# Patient Record
Sex: FEMALE | Race: WHITE | NOT HISPANIC OR LATINO | Employment: FULL TIME | URBAN - METROPOLITAN AREA
[De-identification: names, ages, dates, MRNs, and addresses within clinical notes are randomized per-mention and may not be internally consistent; named-entity substitution may affect disease eponyms.]

---

## 2017-01-25 ENCOUNTER — OFFICE VISIT (OUTPATIENT)
Dept: NEUROLOGY | Facility: CLINIC | Age: 32
End: 2017-01-25
Payer: COMMERCIAL

## 2017-01-25 VITALS
SYSTOLIC BLOOD PRESSURE: 133 MMHG | DIASTOLIC BLOOD PRESSURE: 82 MMHG | HEART RATE: 78 BPM | WEIGHT: 176.4 LBS | BODY MASS INDEX: 29.39 KG/M2 | HEIGHT: 65 IN

## 2017-01-25 DIAGNOSIS — R51.9 CHRONIC DAILY HEADACHE: Primary | ICD-10-CM
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
## 2017-01-25 PROCEDURE — 99204 OFFICE O/P NEW MOD 45 MIN: CPT | Performed by: PSYCHIATRY & NEUROLOGY

## 2017-01-25 RX ORDER — GABAPENTIN 100 MG/1
CAPSULE ORAL
Qty: 270 CAPSULE | Refills: 1 | Status: SHIPPED | OUTPATIENT
Start: 2017-01-25 | End: 2017-04-26 | Stop reason: DRUGHIGH
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
## 2017-01-25 NOTE — PATIENT INSTRUCTIONS
AFTER VISIT SUMMARY (AVS)  Signed Prescriptions:                        Disp   Refills    gabapentin (NEURONTIN) 100 MG capsule      270 ca*1        Sig: Week 1 and 2: 100 mg bed time. Week 3 and 4: 100 mg           two times/day. Week 5 and afterwards: 100 mg           three times/day  Authorizing Provider: DINORA RODRIGUEZ    She may consider taking over the counter ALEVE 200 MG 1-2 tablets for acute headaches. Made aware of rebound headache if she keeps taking it on a daily basis.     Diagnostic possibilities reviewed    Preventive Neurology: Encouraged to keep physically and mentally active with particular emphasis on daily stretching exercises, walking, and healthy eating.    To call us for follow-up appointment in next 3 month(s) or earlier if needed.    Thanks
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
## 2017-03-27 ENCOUNTER — TELEPHONE (OUTPATIENT)
Dept: NEUROLOGY | Facility: CLINIC | Age: 32
End: 2017-03-27

## 2017-03-27 ENCOUNTER — OFFICE VISIT (OUTPATIENT)
Dept: FAMILY MEDICINE | Facility: CLINIC | Age: 32
End: 2017-03-27
Payer: COMMERCIAL

## 2017-03-27 VITALS — SYSTOLIC BLOOD PRESSURE: 120 MMHG | TEMPERATURE: 98.8 F | DIASTOLIC BLOOD PRESSURE: 70 MMHG

## 2017-03-27 DIAGNOSIS — Z71.84 TRAVEL ADVICE ENCOUNTER: ICD-10-CM

## 2017-03-27 DIAGNOSIS — Z23 NEED FOR VACCINATION: Primary | ICD-10-CM

## 2017-03-27 PROCEDURE — 90472 IMMUNIZATION ADMIN EACH ADD: CPT | Mod: GA | Performed by: NURSE PRACTITIONER

## 2017-03-27 PROCEDURE — 99402 PREV MED CNSL INDIV APPRX 30: CPT | Mod: 25 | Performed by: NURSE PRACTITIONER

## 2017-03-27 PROCEDURE — 90471 IMMUNIZATION ADMIN: CPT | Mod: GA | Performed by: NURSE PRACTITIONER

## 2017-03-27 PROCEDURE — 90691 TYPHOID VACCINE IM: CPT | Mod: GA | Performed by: NURSE PRACTITIONER

## 2017-03-27 PROCEDURE — 90632 HEPA VACCINE ADULT IM: CPT | Mod: GA | Performed by: NURSE PRACTITIONER

## 2017-03-27 RX ORDER — AZITHROMYCIN 500 MG/1
500 TABLET, FILM COATED ORAL DAILY
Qty: 3 TABLET | Refills: 0 | Status: SHIPPED | OUTPATIENT
Start: 2017-03-27 | End: 2017-03-30

## 2017-03-27 NOTE — NURSING NOTE
"Chief Complaint   Patient presents with     [facility]     initial /70  Temp 98.8  F (37.1  C) (Oral) Estimated body mass index is 29.35 kg/(m^2) as calculated from the following:    Height as of 1/25/17: 5' 5\" (1.651 m).    Weight as of 1/25/17: 176 lb 6.4 oz (80 kg).  BP completed using cuff size: regular.  R arm      Health Maintenance that is potentially due pending provider review:  NONE    n/a    Shan Rocha ma  "

## 2017-03-27 NOTE — TELEPHONE ENCOUNTER
Called number listed below and was told they need to contact the [facility] insurance for coverage due to claim filed with them and not primary insurance. Pharmacy notified. Nothing need to be done on our end and no PA is needed at this time  Lucia Escobar MA

## 2017-03-27 NOTE — PATIENT INSTRUCTIONS
Today March 27, 2017 you received the    Hepatitis A Vaccine - Please return on 9/23/17 or later for your 2nd and final dose.    Typhoid - injectable. This vaccine is valid for two years.   .    These appointments can be made as a NURSE ONLY visit.    **It is very important for the vaccinations to be given on the scheduled day(s), this helps ensure you receive the full effectiveness of the vaccine.**    Please call [facility] with any questions [phone]    Thank you for visiting [facility]

## 2017-03-27 NOTE — PROGRESS NOTES
Nurse Note      Itinerary:  [place]      Departure Date: 4/8/17      Return Date: 4/15/17      Length of Trip 1 week      Reason for Travel: Tourism           Urban or rural: both      Accommodations: Hotel        IMMUNIZATION HISTORY  Have you received any immunizations within the past 4 weeks?  No  Have you ever fainted from having your blood drawn or from an injection?  No  Have you ever had a fever reaction to vaccination?  No  Have you ever had any bad reaction or side effect from any vaccination?  No  Have you ever had hepatitis A or B vaccine?  No  Do you live (or work closely) with anyone who has AIDS, an AIDS-like condition, any other immune disorder or who is on chemotherapy for cancer?  No  Do you have a family history of immunodeficiency?  No  Have you received any injection of immune globulin or any blood products during the past 12 months?  No    Patient roomed by Shan Slaughter  Teresa Salinas is a 31 year old female seen today with spouse for counsultation for international travel to [place] for Tourism.  Patient will be departing in  10 day(s) and staying for   1 week(s) and  traveling with spouse.      Patient itinerary :  will be in the [facility] which presents risk for Dengue Fever, Chikungungya, Zika, food borne illnesses, motor vehicle accidents, Typhoid and Chagas disease. exposure.      Patient's activities will include sightseeing, beach activities (salt water) and fresh water exposure.    Patient's country of birth is [place]    Special medical concerns: family planning questions  Pre-travel questionnaire was completed by patient and reviewed by provider.     Vitals: /70  Temp 98.8  F (37.1  C) (Oral)  BMI= There is no height or weight on file to calculate BMI.    EXAM:  General:  Well-nourished, well-developed in no acute distress.  Appears to be stated age, interacts appropriately and expresses understanding of information given to  patient.    Current Outpatient Prescriptions   Medication Sig Dispense Refill     gabapentin (NEURONTIN) 100 MG capsule Week 1 and 2: 100 mg bed time. Week 3 and 4: 100 mg two times/day. Week 5 and afterwards: 100 mg three times/day 270 capsule 1     minoxidil 5 % SOLN Apply topically twice daily. 60 mL 0     cetirizine (ZYRTEC) 10 MG tablet Take 1 tablet (10 mg) by mouth every evening 30 tablet 1     multivitamin, therapeutic with minerals (MULTI-VITAMIN) TABS Take 1 tablet by mouth daily 100 tablet 3     Cholecalciferol (VITAMIN D) 2000 UNITS tablet Take 2,000 Units by mouth daily 100 tablet 3     etonogestrel (IMPLANON/NEXPLANON) 68 MG IMPL 1 each (68 mg) by Subdermal route once 1 each 0     biotin (BIOTIN 5000) 5 MG CAPS  30 capsule      albuterol (PROAIR HFA, PROVENTIL HFA, VENTOLIN HFA) 108 (90 BASE) MCG/ACT inhaler Inhale 2 puffs into the lungs every 6 hours as needed for shortness of breath / dyspnea or wheezing 2 Inhaler 6     Patient Active Problem List   Diagnosis     Allergic rhinitis     Mild intermittent asthma     Family history of thyroid disease     Allergies   Allergen Reactions     Contrast Dye Shortness Of Breath     Clindamycin Hives     Diagnostic X-Ray Materials Itching     Itching, sneezing.         Immunizations discussed include:   Hepatitis A:  Ordered/given today, risks, benefits and side effects reviewed  Hepatitis B: Up to date  Influenza: Up to date  Typhoid: Ordered/given today, risks, benefits and side effects reviewed  Rabies: Declined  Not concerned about risk of disease  Yellow Fever: Not indicated  Japanese Encephalitis: Not indicated  Meningococcus: Not indicated  Tetanus/Diphtheria: Up to date  Measles/Mumps/Rubella: Up to date  Cholera: Not needed  Polio: Up to date  Pneumococcal: Under age of 65  Varicella: Immune by disease history per patient report  Zostavax:  Not indicated  HPV:  Not indicated  TB:  Low risk     Altitude Exposure on this trip: No    ASSESSMENT/PLAN:     ICD-10-CM    1. Need for vaccination Z23 HEPA VACCINE ADULT IM     TYPHOID VACCINE, IM     HEPA VACCINE ADULT IM   2. Travel advice encounter Z71.89 HEPA VACCINE ADULT IM     TYPHOID VACCINE, IM     HEPA VACCINE ADULT IM     azithromycin (ZITHROMAX) 500 MG tablet     I have reviewed general recommendations for safe travel   including: food/water precautions, insect precautions, safer sex   practices given high prevalence of Zika, HIV and other STDs,   roadway safety. Educational materials and [employer] report provided.    [place]ia prophylaxis recommended: none  Symptomatic treatment for traveler's diarrhea: azithromycin  Altitude illness prevention and treatment: no      Evacuation insurance advised and resources were provided to patient.    Total visit time 30 minutes  with over 50% of time spent counseling patient as detailed above.    Mariya Hutchinson CNP

## 2017-03-27 NOTE — TELEPHONE ENCOUNTER
Plan does not cover gabapentin (NEURONTIN) 100 MG capsule.  Please call [phone] to initiate Prior Auth or change med.      ID# [id number]      [facility]

## 2017-04-26 ENCOUNTER — OFFICE VISIT (OUTPATIENT)
Dept: NEUROLOGY | Facility: CLINIC | Age: 32
End: 2017-04-26
Payer: COMMERCIAL

## 2017-04-26 VITALS
SYSTOLIC BLOOD PRESSURE: 142 MMHG | WEIGHT: 177 LBS | HEIGHT: 65 IN | BODY MASS INDEX: 29.49 KG/M2 | DIASTOLIC BLOOD PRESSURE: 67 MMHG | HEART RATE: 69 BPM

## 2017-04-26 DIAGNOSIS — R51.9 CHRONIC DAILY HEADACHE: Primary | ICD-10-CM

## 2017-04-26 PROCEDURE — 99214 OFFICE O/P EST MOD 30 MIN: CPT | Performed by: PSYCHIATRY & NEUROLOGY

## 2017-04-26 RX ORDER — GABAPENTIN 100 MG/1
CAPSULE ORAL
Qty: 540 CAPSULE | Refills: 1 | Status: SHIPPED | OUTPATIENT
Start: 2017-04-26 | End: 2017-07-05 | Stop reason: DRUGHIGH

## 2017-04-26 NOTE — NURSING NOTE
IMAGING: None  BLOOD TEST RESULTS: Not recommended on previous visit  Referral: YULIYA  Old records: YULIYA  EEG: YULIYA Rausch MA

## 2017-04-26 NOTE — NURSING NOTE
"Chief Complaint   Patient presents with     RECHECK     Headaches follow up/medication follow up (Neurotin). Pt complains of daily headaches. Headaches occurs 75% of the day.        Initial /67 (BP Location: Left arm, Patient Position: Chair, Cuff Size: Adult Regular)  Pulse 69  Ht 1.651 m (5' 5\")  Wt 80.3 kg (177 lb)  BMI 29.45 kg/m2 Estimated body mass index is 29.45 kg/(m^2) as calculated from the following:    Height as of this encounter: 1.651 m (5' 5\").    Weight as of this encounter: 80.3 kg (177 lb).  Medication Reconciliation: complete   Maya Rausch MA      "

## 2017-04-26 NOTE — PATIENT INSTRUCTIONS
AFTER VISIT SUMMARY (AVS)  Signed Prescriptions:                        Disp   Refills    gabapentin (NEURONTIN) 100 MG capsule      540 ca*1        Sig: Week 1: 100 mg morning and noon, 200 mg bed time.           Week 2: 200 mg morning and bed time, 100 mg noon.           Week 3 and afterwards: 200 mg three times/day  Authorizing Provider: DNIORA RODRIGUEZ      Diagnostic possibilities reviewed    Preventive Neurology: Encouraged to keep physically and mentally active with particular emphasis on daily stretching exercises, walking, and healthy eating.    To call us for follow-up appointment in next 10 week(s) or earlier if needed.    Thanks

## 2017-04-26 NOTE — MR AVS SNAPSHOT
After Visit Summary   4/26/2017    Teresa Salinas    MRN: [id number]           Patient Information     Date Of Birth          1985        Visit Information        Provider Department      4/26/2017 2:00 PM Ricardo Rodriguez MD [facility]        Today's Diagnoses     Chronic daily headache    -  1      Care Instructions    AFTER VISIT SUMMARY (AVS)  Signed Prescriptions:                        Disp   Refills    gabapentin (NEURONTIN) 100 MG capsule      540 ca*1        Sig: Week 1: 100 mg morning and noon, 200 mg bed time.           Week 2: 200 mg morning and bed time, 100 mg noon.           Week 3 and afterwards: 200 mg three times/day  Authorizing Provider: RICARDO RODRIGUEZ      Diagnostic possibilities reviewed    Preventive Neurology: Encouraged to keep physically and mentally active with particular emphasis on daily stretching exercises, walking, and healthy eating.    To call us for follow-up appointment in next 10 week(s) or earlier if needed.    Thanks               Follow-ups after your visit        Follow-up notes from your care team     Return in about 4 weeks (around 5/24/2017).      Who to contact     If you have questions or need follow up information about today's clinic visit or your schedule please contact [facility] directly at [phone].  Normal or non-critical lab and imaging results will be communicated to you by MyChart, letter or phone within 4 business days after the clinic has received the results. If you do not hear from us within 7 days, please contact the clinic through MyChart or phone. If you have a critical or abnormal lab result, we will notify you by phone as soon as possible.  Submit refill requests through [employer] or call your pharmacy and they will forward the refill request to us. Please allow 3 business days for your refill to be completed.          Additional Information About Your Visit        MyChart Information     [employer]  "gives you secure access to your electronic health record. If you see a primary care provider, you can also send messages to your care team and make appointments. If you have questions, please call your primary care clinic.  If you do not have a primary care provider, please call [phone] and they will assist you.        Care EveryWhere ID     This is your Care EveryWhere ID. This could be used by other organizations to access your [facility] medical records  [id number]        Your Vitals Were     Pulse Height BMI (Body Mass Index)             69 1.651 m (5' 5\") 29.45 kg/m2          Blood Pressure from Last 3 Encounters:   04/26/17 142/67   03/27/17 120/70   01/25/17 133/82    Weight from Last 3 Encounters:   04/26/17 80.3 kg (177 lb)   01/25/17 80 kg (176 lb 6.4 oz)   12/29/16 79.4 kg (175 lb)              Today, you had the following     No orders found for display         Today's Medication Changes          These changes are accurate as of: 4/26/17  2:32 PM.  If you have any questions, ask your nurse or doctor.               These medicines have changed or have updated prescriptions.        Dose/Directions    gabapentin 100 MG capsule   Commonly known as:  NEURONTIN   This may have changed:  additional instructions   Used for:  Chronic daily headache   Changed by:  Ricardo Ponce MD        Week 1: 100 mg morning and noon, 200 mg bed time. Week 2: 200 mg morning and bed time, 100 mg noon. Week 3 and afterwards: 200 mg three times/day   Quantity:  540 capsule   Refills:  1            Where to get your medicines      Some of these will need a paper prescription and others can be bought over the counter.  Ask your nurse if you have questions.     Bring a paper prescription for each of these medications     gabapentin 100 MG capsule                Primary Care Provider Office Phone # Fax #    Kacie Vazquez [phone]       [facility] [address] [facility] [address]   "      Thank you!     Thank you for choosing [facility]  for your care. Our goal is always to provide you with excellent care. Hearing back from our patients is one way we can continue to improve our services. Please take a few minutes to complete the written survey that you may receive in the mail after your visit with us. Thank you!             Your Updated Medication List - Protect others around you: Learn how to safely use, store and throw away your medicines at www.disposemymeds.org.          This list is accurate as of: 4/26/17  2:32 PM.  Always use your most recent med list.                   Brand Name Dispense Instructions for use    albuterol 108 (90 BASE) MCG/ACT Inhaler    PROAIR HFA/PROVENTIL HFA/VENTOLIN HFA    2 Inhaler    Inhale 2 puffs into the lungs every 6 hours as needed for shortness of breath / dyspnea or wheezing       BIOTIN 5000 5 MG Caps   Generic drug:  biotin     30 capsule        cetirizine 10 MG tablet    zyrTEC    30 tablet    Take 1 tablet (10 mg) by mouth every evening       etonogestrel 68 MG Impl    IMPLANON/NEXPLANON    1 each    1 each (68 mg) by Subdermal route once       gabapentin 100 MG capsule    NEURONTIN    540 capsule    Week 1: 100 mg morning and noon, 200 mg bed time. Week 2: 200 mg morning and bed time, 100 mg noon. Week 3 and afterwards: 200 mg three times/day       minoxidil 5 % Soln     60 mL    Apply topically twice daily.       Multi-vitamin Tabs tablet     100 tablet    Take 1 tablet by mouth daily       vitamin D 2000 UNITS tablet     100 tablet    Take 2,000 Units by mouth daily

## 2017-04-30 NOTE — PROGRESS NOTES
"                                          ESTABLISHED PATIENT NEUROLOGY NOTE    LOCATION: [facility]   DATE OF VISIT: 2017  NAME: Ms.Lindsey Salinas  : 1985 (31 year old)  MR #: [id number]    PRIMARY/REFERRING PROVIDER: Kacie Vazquez NP    REASON FOR VISIT: Headache review    HISTORY OF PRESENT ILLNESS: Ms. Salinas is 30 y/o woman with chronic headache. The following issues reviewed today:  Daily headaches.   Taking Gabapentin 100 mg TID without side effects or any compliance issues.   She does feel that with Gabapentin the duration of her daily headache is reduced- headache present 70-75% of the time now.   Sleeps 8-9 hours.  Diet soda 1 per day    CURRENT MEDICATIONS:   Current Outpatient Prescriptions on File Prior to Visit:  minoxidil 5 % SOLN Apply topically twice daily.   cetirizine (ZYRTEC) 10 MG tablet Take 1 tablet (10 mg) by mouth every evening   multivitamin, therapeutic with minerals (MULTI-VITAMIN) TABS Take 1 tablet by mouth daily   Cholecalciferol (VITAMIN D) 2000 UNITS tablet Take 2,000 Units by mouth daily   etonogestrel (IMPLANON/NEXPLANON) 68 MG IMPL 1 each (68 mg) by Subdermal route once   biotin (BIOTIN 5000) 5 MG CAPS    albuterol (PROAIR HFA, PROVENTIL HFA, VENTOLIN HFA) 108 (90 BASE) MCG/ACT inhaler Inhale 2 puffs into the lungs every 6 hours as needed for shortness of breath / dyspnea or wheezing     No current facility-administered medications on file prior to visit.   PAST MEDICAL HISTORY: Past Medical History:   Diagnosis Date     Allergic rhinitis 3/11/2014     Asthma 3/11/2014     Family history of thyroid disease 2015     Past Surgical, Personal & Social history reviewed & documented in the [facility].  GENERAL EXAMINATION:  /67 (BP Location: Left arm, Patient Position: Chair, Cuff Size: Adult Regular)  Pulse 69  Ht 1.651 m (5' 5\")  Wt 80.3 kg (177 lb)  BMI 29.45 kg/m2    IMPRESSION:  Encounter Diagnoses   Name Primary?     Chronic daily headache Yes "     PLANS:   Patient Instructions   AFTER VISIT SUMMARY (AVS)  Signed Prescriptions:                        Disp   Refills    gabapentin (NEURONTIN) 100 MG capsule      540 ca*1        Sig: Week 1: 100 mg morning and noon, 200 mg bed time.           Week 2: 200 mg morning and bed time, 100 mg noon.           Week 3 and afterwards: 200 mg three times/day  Authorizing Provider: RICARDO RODRIGUEZ      Diagnostic possibilities reviewed    Preventive Neurology: Encouraged to keep physically and mentally active with particular emphasis on daily stretching exercises, walking, and healthy eating.    To call us for follow-up appointment in next 10 week(s) or earlier if needed.    Thanks to Kacie Vazquez NP for allowing me to participate in Ms. Salinas's care. Please feel free to call me with any questions or concerns.     Total Time: 25 minutes. More than 50% of the time spent counseling regarding the diagnosis, treatment plan and educational aspects of care    Ricardo Rodriguez MD, [facility]  Neurologist    Cc: Kacie Vazquez NP

## 2017-07-05 ENCOUNTER — OFFICE VISIT (OUTPATIENT)
Dept: NEUROLOGY | Facility: CLINIC | Age: 32
End: 2017-07-05
Payer: COMMERCIAL

## 2017-07-05 VITALS
HEIGHT: 65 IN | BODY MASS INDEX: 29.26 KG/M2 | WEIGHT: 175.6 LBS | DIASTOLIC BLOOD PRESSURE: 66 MMHG | SYSTOLIC BLOOD PRESSURE: 125 MMHG | HEART RATE: 71 BPM

## 2017-07-05 DIAGNOSIS — R51.9 CHRONIC DAILY HEADACHE: Primary | ICD-10-CM

## 2017-07-05 PROCEDURE — 99213 OFFICE O/P EST LOW 20 MIN: CPT | Performed by: PSYCHIATRY & NEUROLOGY

## 2017-07-05 RX ORDER — GABAPENTIN 300 MG/1
300 CAPSULE ORAL 3 TIMES DAILY
Qty: 270 CAPSULE | Refills: 3 | Status: SHIPPED | OUTPATIENT
Start: 2017-08-05 | End: 2018-06-29

## 2017-07-05 RX ORDER — GABAPENTIN 100 MG/1
CAPSULE ORAL
Qty: 210 CAPSULE | Refills: 0 | Status: SHIPPED | OUTPATIENT
Start: 2017-07-05 | End: 2018-06-29

## 2017-07-05 NOTE — PATIENT INSTRUCTIONS
AFTER VISIT SUMMARY (AVS)  Signed Prescriptions:                        Disp   Refills    gabapentin (NEURONTIN) 100 MG capsule      210 ca*0        Sig: Week 1 & 2: 200 mg morning & lunch, 300 mg at bed           time.  Week 3 & 4: 300 mg morning and bed time,           200 mg lunch time.  Authorizing Provider: DINORA RODRIGUEZ    gabapentin (NEURONTIN) 300 MG capsule      270 ca*3        Sig: Take 1 capsule (300 mg) by mouth 3 times daily           Starting from 8/5/2017  Authorizing Provider: DINORA RODRIGUEZ      Diagnostic possibilities reviewed    Preventive Neurology: Encouraged to keep physically and mentally active with particular emphasis on daily stretching exercises, walking, and healthy eating.    To call us for follow-up appointment in next 10 week(s) or earlier if needed.    Thanks

## 2017-07-05 NOTE — MR AVS SNAPSHOT
After Visit Summary   7/5/2017    Teresa Salinas    MRN: [id number]           Patient Information     Date Of Birth          1985        Visit Information        Provider Department      7/5/2017 11:40 AM Ricardo Rodriguez MD [facility]        Today's Diagnoses     Chronic daily headache    -  1      Care Instructions    AFTER VISIT SUMMARY (AVS)  Signed Prescriptions:                        Disp   Refills    gabapentin (NEURONTIN) 100 MG capsule      210 ca*0        Sig: Week 1 & 2: 200 mg morning & lunch, 300 mg at bed           time.  Week 3 & 4: 300 mg morning and bed time,           200 mg lunch time.  Authorizing Provider: RICARDO RODRIGUEZ    gabapentin (NEURONTIN) 300 MG capsule      270 ca*3        Sig: Take 1 capsule (300 mg) by mouth 3 times daily           Starting from 8/5/2017  Authorizing Provider: RICARDO RODRIGUEZ      Diagnostic possibilities reviewed    Preventive Neurology: Encouraged to keep physically and mentally active with particular emphasis on daily stretching exercises, walking, and healthy eating.    To call us for follow-up appointment in next 10 week(s) or earlier if needed.    Thanks               Follow-ups after your visit        Follow-up notes from your care team     Return in about 10 weeks (around 9/13/2017).      Who to contact     If you have questions or need follow up information about today's clinic visit or your schedule please contact [facility] [place] [facility] directly at [phone].  Normal or non-critical lab and imaging results will be communicated to you by MyChart, letter or phone within 4 business days after the clinic has received the results. If you do not hear from us within 7 days, please contact the clinic through MyChart or phone. If you have a critical or abnormal lab result, we will notify you by phone as soon as possible.  Submit refill requests through [employer] or call your pharmacy and they will forward the refill  "request to us. Please allow 3 business days for your refill to be completed.          Additional Information About Your Visit        [employer] Information     [employer] gives you secure access to your electronic health record. If you see a primary care provider, you can also send messages to your care team and make appointments. If you have questions, please call your primary care clinic.  If you do not have a primary care provider, please call [phone] and they will assist you.        Care EveryWhere ID     This is your Care EveryWhere ID. This could be used by other organizations to access your [facility] medical records  [id number]        Your Vitals Were     Pulse Height BMI (Body Mass Index)             71 1.651 m (5' 5\") 29.22 kg/m2          Blood Pressure from Last 3 Encounters:   07/05/17 125/66   04/26/17 142/67   03/27/17 120/70    Weight from Last 3 Encounters:   07/05/17 79.7 kg (175 lb 9.6 oz)   04/26/17 80.3 kg (177 lb)   01/25/17 80 kg (176 lb 6.4 oz)              Today, you had the following     No orders found for display         Today's Medication Changes          These changes are accurate as of: 7/5/17  1:03 PM.  If you have any questions, ask your nurse or doctor.               These medicines have changed or have updated prescriptions.        Dose/Directions    * gabapentin 100 MG capsule   Commonly known as:  NEURONTIN   This may have changed:  additional instructions   Used for:  Chronic daily headache   Changed by:  Ricardo Ponce MD        Week 1 & 2: 200 mg morning & lunch, 300 mg at bed time.  Week 3 & 4: 300 mg morning and bed time, 200 mg lunch time.   Quantity:  210 capsule   Refills:  0       * gabapentin 300 MG capsule   Commonly known as:  NEURONTIN   This may have changed:  You were already taking a medication with the same name, and this prescription was added. Make sure you understand how and when to take each.   Used for:  Chronic daily headache   Changed by:  Ricardo Ponce" MD Deep        Dose:  300 mg   Start taking on:  8/5/2017   Take 1 capsule (300 mg) by mouth 3 times daily Starting from 8/5/2017   Quantity:  270 capsule   Refills:  3       * Notice:  This list has 2 medication(s) that are the same as other medications prescribed for you. Read the directions carefully, and ask your doctor or other care provider to review them with you.         Where to get your medicines      These medications were sent to [place] [address] [facility] - [address] [address]     Phone:  [phone]     gabapentin 100 MG capsule    gabapentin 300 MG capsule                Primary Care Provider Office Phone # Fax #    Kacie Sewelltomas, TITA [phone]       [facility] [address] [facility] [address]        Equal Access to Services     CAROLANN MCCURDY : Hadii aad ku hadasho Sochela, waaxda luqadaha, qaybta kaalmada adeegyada, gladys hartley. So [facility] [phone].    ATENCIÓN: Si habla español, tiene a dorsey disposición servicios gratuitos de asistencia lingüística. Ric al [phone].    We comply with applicable federal civil rights laws and [place] laws. We do not discriminate on the basis of race, color, national origin, age, disability sex, sexual orientation or gender identity.            Thank you!     Thank you for choosing [facility]  for your care. Our goal is always to provide you with excellent care. Hearing back from our patients is one way we can continue to improve our services. Please take a few minutes to complete the written survey that you may receive in the mail after your visit with us. Thank you!             Your Updated Medication List - Protect others around you: Learn how to safely use, store and throw away your medicines at www.disposemymeds.org.          This list is accurate as of: 7/5/17  1:03 PM.  Always use your most recent med list.                    Brand Name Dispense Instructions for use Diagnosis    albuterol 108 (90 BASE) MCG/ACT Inhaler    PROAIR HFA/PROVENTIL HFA/VENTOLIN HFA    2 Inhaler    Inhale 2 puffs into the lungs every 6 hours as needed for shortness of breath / dyspnea or wheezing    Mild intermittent asthma, with acute exacerbation, Other allergic rhinitis       BIOTIN 5000 5 MG Caps   Generic drug:  biotin     30 capsule         cetirizine 10 MG tablet    zyrTEC    30 tablet    Take 1 tablet (10 mg) by mouth every evening        etonogestrel 68 MG Impl    IMPLANON/NEXPLANON    1 each    1 each (68 mg) by Subdermal route once        * gabapentin 100 MG capsule    NEURONTIN    210 capsule    Week 1 & 2: 200 mg morning & lunch, 300 mg at bed time.  Week 3 & 4: 300 mg morning and bed time, 200 mg lunch time.    Chronic daily headache       * gabapentin 300 MG capsule   Start taking on:  8/5/2017    NEURONTIN    270 capsule    Take 1 capsule (300 mg) by mouth 3 times daily Starting from 8/5/2017    Chronic daily headache       minoxidil 5 % Soln     60 mL    Apply topically twice daily.        Multi-vitamin Tabs tablet     100 tablet    Take 1 tablet by mouth daily        vitamin D 2000 UNITS tablet     100 tablet    Take 2,000 Units by mouth daily        * Notice:  This list has 2 medication(s) that are the same as other medications prescribed for you. Read the directions carefully, and ask your doctor or other care provider to review them with you.

## 2017-07-05 NOTE — NURSING NOTE
"Chief Complaint   Patient presents with     RECHECK     Chronic Headache       Initial /66 (BP Location: Right arm, Patient Position: Chair, Cuff Size: Adult Regular)  Pulse 71  Ht 1.651 m (5' 5\")  Wt 79.7 kg (175 lb 9.6 oz)  BMI 29.22 kg/m2 Estimated body mass index is 29.22 kg/(m^2) as calculated from the following:    Height as of this encounter: 1.651 m (5' 5\").    Weight as of this encounter: 79.7 kg (175 lb 9.6 oz).  Medication Reconciliation: complete   Lucia Escobar MA      "

## 2017-07-08 NOTE — PROGRESS NOTES
"                                        ESTABLISHED PATIENT NEUROLOGY NOTE    LOCATION: [facility]  DATE OF VISIT: 2017  NAME: Ms.Lindsey Salinas  : 1985 (31 year old)  MR #: [id number]    PRIMARY/REFERRING PROVIDER: Kacie Vazquez NP    REASON FOR VISIT: Headache    HISTORY OF PRESENT ILLNESS: 31-year-old woman with a history of chronic headaches.  She relates that since   starting gabapentin the frequency of the headache has decreased from 70% to 30% but severity has remained more or less same at 2/10. No episodes of acute headache.  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  She still drinks one soda a day.    Denies any depression.      CURRENT MEDICATIONS:   Current Outpatient Prescriptions on File Prior to Visit:  minoxidil 5 % SOLN Apply topically twice daily.   cetirizine (ZYRTEC) 10 MG tablet Take 1 tablet (10 mg) by mouth every evening   multivitamin, therapeutic with minerals (MULTI-VITAMIN) TABS Take 1 tablet by mouth daily   Cholecalciferol (VITAMIN D) 2000 UNITS tablet Take 2,000 Units by mouth daily   etonogestrel (IMPLANON/NEXPLANON) 68 MG IMPL 1 each (68 mg) by Subdermal route once   biotin (BIOTIN 5000) 5 MG CAPS    albuterol (PROAIR HFA, PROVENTIL HFA, VENTOLIN HFA) 108 (90 BASE) MCG/ACT inhaler Inhale 2 puffs into the lungs every 6 hours as needed for shortness of breath / dyspnea or wheezing     No current facility-administered medications on file prior to visit.   PAST MEDICAL HISTORY: Past Medical History:   Diagnosis Date     Allergic rhinitis 3/11/2014     Asthma 3/11/2014     Family history of thyroid disease 2015     Past Surgical, Personal & Social history reviewed & documented in the [facility].  GENERAL EXAMINATION:  /66 (BP Location: Right arm, Patient Position: Chair, Cuff Size: Adult Regular)  Pulse 71  Ht 1.651 m (5' 5\")  Wt 79.7 kg (175 lb 9.6 oz)  BMI 29.22 kg/m2    IMPRESSION:   Encounter Diagnoses   Name " Primary?     Chronic daily headache Yes     COMMENTS: Hopefully increasing gabapentin in smaller increments will reduce her headache further.    PLANS:   Patient Instructions   AFTER VISIT SUMMARY (AVS)  Signed Prescriptions:                        Disp   Refills    gabapentin (NEURONTIN) 100 MG capsule      210 ca*0        Sig: Week 1 & 2: 200 mg morning & lunch, 300 mg at bed           time.  Week 3 & 4: 300 mg morning and bed time,           200 mg lunch time.  Authorizing Provider: RICARDO RODRIGUEZ    gabapentin (NEURONTIN) 300 MG capsule      270 ca*3        Sig: Take 1 capsule (300 mg) by mouth 3 times daily           Starting from 8/5/2017  Authorizing Provider: RICARDO RODRIGUEZ      Diagnostic possibilities reviewed    Preventive Neurology: Encouraged to keep physically and mentally active with particular emphasis on daily stretching exercises, walking, and healthy eating.    To call us for follow-up appointment in next 10 week(s) or earlier if needed.    Thanks to Kacie Vazquez NP for allowing me to participate in Ms. Salinas's care. Please feel free to call me with any questions or concerns.     Ricardo Rodriguez MD, [facility]  Neurologist    Cc: Kacie Vazquez NP

## 2017-08-28 DIAGNOSIS — R51.9 CHRONIC DAILY HEADACHE: ICD-10-CM

## 2017-08-28 RX ORDER — GABAPENTIN 100 MG/1
CAPSULE ORAL
Qty: 210 CAPSULE | Refills: 0 | Status: CANCELLED | OUTPATIENT
Start: 2017-08-28

## 2017-08-28 NOTE — TELEPHONE ENCOUNTER
gabapentin (NEURONTIN) 100 MG capsule      Last Written Prescription Date:  07/05/17  Last Fill Quantity: 210,   # refills: 0  Last Office Visit with [facility], [facility] or  Health prescribing provider: 07/05/17  Future Office visit:       Routing refill request to provider for review/approval because:  Drug not on the [facility], [facility] or [facility] refill protocol or controlled substance      [facility]

## 2017-08-31 NOTE — TELEPHONE ENCOUNTER
Called and confirm with pharmacy patient picked up New Rx for 300mg which is what she should have started on 8/5/2017  Lucia Escobar MA

## 2017-11-09 ENCOUNTER — HOSPITAL ENCOUNTER (OUTPATIENT)
Dept: SPEECH THERAPY | Facility: CLINIC | Age: 32
Setting detail: THERAPIES SERIES
End: 2017-11-09
Attending: PHYSICAL MEDICINE & REHABILITATION
Payer: COMMERCIAL

## 2017-11-09 PROCEDURE — 40000211 ZZHC STATISTIC SLP  DEPARTMENT VISIT

## 2017-11-09 PROCEDURE — 96125 COGNITIVE TEST BY HC PRO: CPT | Mod: GN
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
## 2017-11-13 NOTE — PROGRESS NOTES
Speech Language Pathology     Cognitive Linguistic Quick Test (CLQT)    SUMMARY OF TEST:    The CLQT assesses visual attention and perception, working memory and language output skills, as well as auditory memory and comprehension.  Non-linguistic tasks can help assess planning, and self-monitoring, visual discrimination and analysis, as well as creativity and mental flexibility.   Together, these subtests assess the cognitive domains of attention, memory, executive function, language, and visuospatial skills using a severity rating of either WNL (within normal limits), Mild, Moderate or Severe.    RESULTS OF TESTING:   Attention    Score: 197    Severity Rating: WNL    Memory    Score: 163    Severity Rating: WNL    Executive Functions    Score: 27    Severity Rating: WNL    Language    Score: 30    Severity Rating: WNL   Visuospatial Skills    Score: 94    Severity Rating: WNL    Composite Severity Rating    Score: 4    Severity Rating: WNL    Clock Drawing     Score: 13    Severity Rating: WNL     INTERPRETATION OF TEST RESULTS: The patient completed the CLQT in today's evaluation. The assessment targets attention,  executive function, language, memory, and visuospatial. The patient scored WNL for all subtest categories. The patient demonstrated memory impairment that was not identified on exam. The patient was unable to recall detailed aspects of the test that were completed just prior. Therapy is recommended to increase cognitive abilities to increase ease and safety in work setting  TIME ADMINISTERING TEST: 50  TIME FOR INTERPRETATION AND PREPARATION OF REPORT: 20  TOTAL TIME: 70  Reference:  Yareli Hall, Aurelia, CCC-SLP, (2001) [employer]

## 2017-11-13 NOTE — ADDENDUM NOTE
Encounter addended by: Cheri Ulloa, SLP on: 11/13/2017  1:34 PM<BR>     Actions taken: Sign clinical note

## 2017-11-15 ENCOUNTER — HOSPITAL ENCOUNTER (OUTPATIENT)
Dept: PHYSICAL THERAPY | Facility: CLINIC | Age: 32
Setting detail: THERAPIES SERIES
End: 2017-11-15
Attending: PHYSICAL MEDICINE & REHABILITATION
Payer: COMMERCIAL

## 2017-11-15 PROCEDURE — 40000767 ZZHC STATISTIC PT CONCUSSION VISIT: Performed by: PHYSICAL THERAPIST

## 2017-11-15 PROCEDURE — 97140 MANUAL THERAPY 1/> REGIONS: CPT | Mod: GP | Performed by: PHYSICAL THERAPIST

## 2017-11-15 PROCEDURE — 97110 THERAPEUTIC EXERCISES: CPT | Mod: GP | Performed by: PHYSICAL THERAPIST

## 2017-11-15 PROCEDURE — 97161 PT EVAL LOW COMPLEX 20 MIN: CPT | Mod: GP | Performed by: PHYSICAL THERAPIST

## 2017-11-20 ENCOUNTER — HOSPITAL ENCOUNTER (OUTPATIENT)
Dept: SPEECH THERAPY | Facility: CLINIC | Age: 32
Setting detail: THERAPIES SERIES
End: 2017-11-20
Attending: PHYSICAL MEDICINE & REHABILITATION
Payer: COMMERCIAL

## 2017-11-20 PROCEDURE — 40000211 ZZHC STATISTIC SLP  DEPARTMENT VISIT

## 2017-11-20 PROCEDURE — 97532 ZZHC SP COGNITIVE SKILLS EA 15 MIN: CPT | Mod: GN

## 2017-11-22 ENCOUNTER — HOSPITAL ENCOUNTER (OUTPATIENT)
Dept: PHYSICAL THERAPY | Facility: CLINIC | Age: 32
Setting detail: THERAPIES SERIES
End: 2017-11-22
Attending: PHYSICAL MEDICINE & REHABILITATION
Payer: COMMERCIAL

## 2017-11-22 PROCEDURE — 97140 MANUAL THERAPY 1/> REGIONS: CPT | Mod: GP | Performed by: PHYSICAL THERAPIST

## 2017-11-22 PROCEDURE — 40000767 ZZHC STATISTIC PT CONCUSSION VISIT: Performed by: PHYSICAL THERAPIST

## 2017-11-27 ENCOUNTER — HOSPITAL ENCOUNTER (OUTPATIENT)
Dept: SPEECH THERAPY | Facility: CLINIC | Age: 32
Setting detail: THERAPIES SERIES
End: 2017-11-27
Attending: PHYSICAL MEDICINE & REHABILITATION
Payer: COMMERCIAL

## 2017-11-27 PROCEDURE — 92507 TX SP LANG VOICE COMM INDIV: CPT | Mod: GN

## 2017-11-27 PROCEDURE — 40000211 ZZHC STATISTIC SLP  DEPARTMENT VISIT

## 2017-11-27 PROCEDURE — 97532 ZZHC SP COGNITIVE SKILLS EA 15 MIN: CPT | Mod: GN

## 2017-11-28 NOTE — PROGRESS NOTES
Outpatient Speech Language Pathology Discharge Note     Patient: Teresa Salinas  : 1985    Beginning/End Dates of Reporting Period:  17 to 2017    Referring Provider: Cecile Queen MD    Therapy Diagnosis: TBI    Client Self Report: The patient arrived on time with homework completed. She reported the homework sent home was easy to complete. The patient reports use of compensatory strategies in work setting such as writing notes. The patient reports memory tasks are more difficult at the end of her shift when she is tired.     Objective Measurements: Increase internal and external memory strategies to increase ability to complete tasks associated to the patient's work.      Goals:  Goal Identifier Internal memory   Goal Description The patient will be able to demonstrate understanding of internal memory strategies with no prompts from ST.    Target Date 18   Date Met   17   Progress:     Goal Identifier External memory aids   Goal Description The patient will be able to verbalize and demonstrate understanding of external memory aids as measured by ST.    Target Date 18   Date Met   17   Progress:       Progress Toward Goals:   Progress this reporting period: The patient met goals.  She verbalized and demonstrated understanding of home program.       Plan:  Discharge from therapy.    Discharge:    Reason for Discharge: Patient has met all goals.    Equipment Issued: None    Discharge Plan: Patient to continue home program. The patient was asked to read for 10-15 minutes, then complete a task (dishes/laundry etc), then recall what she read by writing out detail. She was then asked to check her work following the recall. The patient was also asked to try to recall lists (grocery/ to do) without referencing the list. She was then asked to check her memory by reviewing list to account for all items. The patient was sent home with face recognition tasks.

## 2017-11-30 ENCOUNTER — HOSPITAL ENCOUNTER (OUTPATIENT)
Dept: PHYSICAL THERAPY | Facility: CLINIC | Age: 32
Setting detail: THERAPIES SERIES
End: 2017-11-30
Attending: PHYSICAL MEDICINE & REHABILITATION
Payer: COMMERCIAL

## 2017-11-30 PROCEDURE — 97110 THERAPEUTIC EXERCISES: CPT | Mod: GP | Performed by: PHYSICAL THERAPIST

## 2017-11-30 PROCEDURE — 40000767 ZZHC STATISTIC PT CONCUSSION VISIT: Performed by: PHYSICAL THERAPIST

## 2017-11-30 PROCEDURE — 97140 MANUAL THERAPY 1/> REGIONS: CPT | Mod: GP | Performed by: PHYSICAL THERAPIST

## 2017-12-06 ENCOUNTER — HOSPITAL ENCOUNTER (OUTPATIENT)
Dept: PHYSICAL THERAPY | Facility: CLINIC | Age: 32
Setting detail: THERAPIES SERIES
End: 2017-12-06
Attending: PHYSICAL MEDICINE & REHABILITATION
Payer: COMMERCIAL

## 2017-12-06 PROCEDURE — 40000767 ZZHC STATISTIC PT CONCUSSION VISIT: Performed by: PHYSICAL THERAPIST

## 2017-12-06 PROCEDURE — 97110 THERAPEUTIC EXERCISES: CPT | Mod: GP | Performed by: PHYSICAL THERAPIST

## 2017-12-06 PROCEDURE — 97140 MANUAL THERAPY 1/> REGIONS: CPT | Mod: GP | Performed by: PHYSICAL THERAPIST

## 2017-12-13 ENCOUNTER — HOSPITAL ENCOUNTER (OUTPATIENT)
Dept: PHYSICAL THERAPY | Facility: CLINIC | Age: 32
Setting detail: THERAPIES SERIES
End: 2017-12-13
Attending: PHYSICAL MEDICINE & REHABILITATION
Payer: COMMERCIAL

## 2017-12-13 PROCEDURE — 97140 MANUAL THERAPY 1/> REGIONS: CPT | Mod: GP | Performed by: PHYSICAL THERAPIST

## 2017-12-13 PROCEDURE — 40000767 ZZHC STATISTIC PT CONCUSSION VISIT: Performed by: PHYSICAL THERAPIST

## 2017-12-13 NOTE — PROGRESS NOTES
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   Goal Description pt will be able to do half day computer work withou increase in HA in 6wk  (11/30/17 can go about 2 hrs, then HA increase)   Target Date 12/27/17   Goal 2   Goal Description pt headache to be intermittent in nature no worse than 3-4/10 in 4wk  (11/30/17 intermittent, but can be 5/10)   Target Date 12/15/17   Goal 3   Goal Description will have decrease HA to 1x/wk in 6wk   Target Date 12/27/17   Subjective Report   Subjective Report less HA this week, has been off work, less stress, more sleep, was able ot do some low level ex videos at home   Objective Measures   Objective Measures Objective Measure 1   Objective Measure 1   Details tight R up trap>L, L signif decrease in tightness from eval   Therapeutic Procedure/exercise   Minutes 5   Skilled Intervention exertion ex   Patient Response sx listed   Treatment Detail UBE 2min, then biek 3min min increase in HA at 1 min once started feet bike, but did not have to stop   Manual Therapy   Minutes 23   Skilled Intervention jt mobs, STM to imporve mobility, dec pain   Patient Response R mroe tight today   Treatment Detail supine STM suboccip, suboccip release, proenPA T4-7 cavitation x2 T5-6 area, STM R>L up trap, scalene, levator, R mid scap   Plan   Plan for next session cont to progress her videos at home, 1x/wk x2 hope to be getting close to DC   Total Session Time   Timed Code Treatment Minutes 28   Total Treatment Time (sum of timed and untimed services) 28   Kris Hoenk, PT #[id number]  [facility]

## 2017-12-20 ENCOUNTER — HOSPITAL ENCOUNTER (OUTPATIENT)
Dept: PHYSICAL THERAPY | Facility: CLINIC | Age: 32
Setting detail: THERAPIES SERIES
End: 2017-12-20
Attending: PHYSICAL MEDICINE & REHABILITATION
Payer: COMMERCIAL

## 2017-12-20 PROCEDURE — 40000718 ZZHC STATISTIC PT DEPARTMENT ORTHO VISIT: Performed by: PHYSICAL THERAPIST

## 2017-12-20 PROCEDURE — 97140 MANUAL THERAPY 1/> REGIONS: CPT | Mod: GP | Performed by: PHYSICAL THERAPIST

## 2017-12-20 NOTE — PROGRESS NOTES
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   Goal Description pt will be able to do half day computer work withou increase in HA in 6wk  (11/30/17 can go about 2 hrs, then HA increase)   Target Date 12/27/17   Goal 2   Goal Description pt headache to be intermittent in nature no worse than 3-4/10 in 4wk  ( intermittent, but can be 5/10)   Target Date 12/15/17   Goal 3   Goal Description will have decrease HA to 1x/wk in 6wk  (still daily, but not constant)   Target Date 12/27/17   Subjective Report   Subjective Report HAS A COLD, HEADACHES LITTLE WORSE PROBABLY DUE TO CONGESTION, STAYED IN BED ALL DAY YESTERDAY, headaches daily are biggest complaint, but they are better than they were, neck is looser, did 12 hour shifts this weekend, not too bad   Objective Measures   Objective Measures Objective Measure 1;Objective Measure 2   Objective Measure 1   Details min tightness L upper trap, levator, milf tight R mid T paraspinals, CROM WNL   Objective Measure 2   Details exertion exercise at 12/13 visit, pt did UBE 2min, then bike 3min, sx of mild increase HA at about 1 minute into bike   Manual Therapy   Minutes 24   Skilled Intervention jt mobs, STM to imporve mobility, dec pain   Patient Response dec tightness all areas   Treatment Detail prone PA T4-7 cavitation x2 T5-6 area, STM L>R up trap, scalene, levator, R mid scap/paraspinal   Assessments Completed   Assessments Completed all oculomotor testing normal at eval   Plan   Plan for next session DC to HEP, pt to continue to progress her exercise videos at home to return to all prior activities, pt agreeable to cont on her own at this time   Total Session Time   Timed  Code Treatment Minutes 24   Total Treatment Time (sum of timed and untimed services) 24   Kris Hoenk, PT #[id number]  [facility]

## 2018-01-29 NOTE — ADDENDUM NOTE
Encounter addended by: Hoenk, Kris, PT on: 1/29/2018  8:39 AM<BR>     Actions taken: Episode resolved

## 2018-06-29 ENCOUNTER — OFFICE VISIT (OUTPATIENT)
Dept: FAMILY MEDICINE | Facility: CLINIC | Age: 33
End: 2018-06-29
Payer: COMMERCIAL

## 2018-06-29 VITALS
OXYGEN SATURATION: 98 % | DIASTOLIC BLOOD PRESSURE: 78 MMHG | WEIGHT: 170 LBS | HEIGHT: 65 IN | BODY MASS INDEX: 28.32 KG/M2 | TEMPERATURE: 98.3 F | SYSTOLIC BLOOD PRESSURE: 111 MMHG | HEART RATE: 86 BPM | RESPIRATION RATE: 18 BRPM

## 2018-06-29 DIAGNOSIS — Z30.46 NEXPLANON REMOVAL: ICD-10-CM

## 2018-06-29 DIAGNOSIS — J30.89 OTHER ALLERGIC RHINITIS: ICD-10-CM

## 2018-06-29 DIAGNOSIS — Z01.419 WELL WOMAN EXAM WITH ROUTINE GYNECOLOGICAL EXAM: Primary | ICD-10-CM

## 2018-06-29 PROCEDURE — G0145 SCR C/V CYTO,THINLAYER,RESCR: HCPCS | Performed by: FAMILY MEDICINE

## 2018-06-29 PROCEDURE — G0124 SCREEN C/V THIN LAYER BY MD: HCPCS | Performed by: FAMILY MEDICINE

## 2018-06-29 PROCEDURE — 87624 HPV HI-RISK TYP POOLED RSLT: CPT | Performed by: FAMILY MEDICINE

## 2018-06-29 PROCEDURE — 11982 REMOVE DRUG IMPLANT DEVICE: CPT | Performed by: FAMILY MEDICINE

## 2018-06-29 PROCEDURE — 99395 PREV VISIT EST AGE 18-39: CPT | Mod: 25 | Performed by: FAMILY MEDICINE

## 2018-06-29 RX ORDER — ALBUTEROL SULFATE 90 UG/1
2 AEROSOL, METERED RESPIRATORY (INHALATION) EVERY 4 HOURS PRN
Qty: 1 INHALER | Refills: 6 | Status: SHIPPED | OUTPATIENT
Start: 2018-06-29 | End: 2021-11-10

## 2018-06-29 NOTE — PROGRESS NOTES
SUBJECTIVE:   CC: Teresa Salinas is an 32 year old woman who presents for preventive health visit.     Healthy Habits:    Do you get at least three servings of calcium containing foods daily (dairy, green leafy vegetables, etc.)? yes    Amount of exercise or daily activities, outside of work: 5 day(s) per week    Problems taking medications regularly No    Medication side effects: No    Have you had an eye exam in the past two years? yes    Do you see a dentist twice per year? yes    Do you have sleep apnea, excessive snoring or daytime drowsiness?no    Desiring Nexplanon out to try for pregnancy.    Asthma Follow-Up    Was ACT completed today?    Yes    ACT Total Scores 6/29/2018   ACT TOTAL SCORE -   ASTHMA ER VISITS -   ASTHMA HOSPITALIZATIONS -   ACT TOTAL SCORE (Goal Greater than or Equal to 20) 24   In the past 12 months, how many times did you visit the emergency room for your asthma without being admitted to the hospital? 0   In the past 12 months, how many times were you hospitalized overnight because of your asthma? 0       Recent asthma triggers that patient is dealing with: upper respiratory infections        Today's PHQ-2 Score:   PHQ-2 ( 1999 [employer]) 6/29/2018 6/29/2018   Q1: Little interest or pleasure in doing things 0 0   Q2: Feeling down, depressed or hopeless 0 0   PHQ-2 Score 0 0   Q1: Little interest or pleasure in doing things Not at all -   Q2: Feeling down, depressed or hopeless Not at all -   PHQ-2 Score 0 -       Abuse: Current or Past(Physical, Sexual or Emotional)- No  Do you feel safe in your environment - Yes    Social History   Substance Use Topics     Smoking status: Never Smoker     Smokeless tobacco: Never Used     Alcohol use No     If you drink alcohol do you typically have >3 drinks per day or >7 drinks per week? No                     Reviewed orders with patient.  Reviewed health maintenance and updated orders accordingly - Yes  BP Readings from Last 3 Encounters:  "  06/29/18 111/78   07/05/17 125/66   04/26/17 142/67    Wt Readings from Last 3 Encounters:   06/29/18 170 lb (77.1 kg)   07/05/17 175 lb 9.6 oz (79.7 kg)   04/26/17 177 lb (80.3 kg)           Mammogram not appropriate for this patient based on age.    Pertinent mammograms are reviewed under the imaging tab.  History of abnormal Pap smear: NO - age 30-65 PAP every 5 years with negative HPV co-testing recommended  PAP / HPV Latest Ref Rng & Units 6/12/2015   PAP - NIL   HPV 16 DNA NEG Negative   HPV 18 DNA NEG Negative   OTHER HR HPV NEG Negative     Reviewed and updated as needed this visit by clinical staff  Tobacco  Allergies  Meds  Soc Hx        Reviewed and updated as needed this visit by Provider            ROS:  CONSTITUTIONAL: NEGATIVE for fever, chills, change in weight  INTEGUMENTARU/SKIN: NEGATIVE for worrisome rashes, moles or lesions  EYES: NEGATIVE for vision changes or irritation  ENT: NEGATIVE for ear, mouth and throat problems  RESP: NEGATIVE for significant cough or SOB  BREAST: NEGATIVE for masses, tenderness or discharge  CV: NEGATIVE for chest pain, palpitations or peripheral edema  GI: NEGATIVE for nausea, abdominal pain, heartburn, or change in bowel habits  : NEGATIVE for unusual urinary or vaginal symptoms. Periods are irregular with Nexplanon.  MUSCULOSKELETAL: NEGATIVE for significant arthralgias or myalgia  NEURO: NEGATIVE for weakness, dizziness or paresthesias  PSYCHIATRIC: NEGATIVE for changes in mood or affect    OBJECTIVE:   /78  Pulse 86  Temp 98.3  F (36.8  C)  Resp 18  Ht 5' 5\" (1.651 m)  Wt 170 lb (77.1 kg)  SpO2 98%  BMI 28.29 kg/m2  EXAM:  GENERAL: healthy, alert and no distress  EYES: Eyes grossly normal to inspection, PERRL and conjunctivae and sclerae normal  HENT: ear canals and TM's normal, nose and mouth without ulcers or lesions  NECK: no adenopathy, no asymmetry, masses, or scars and thyroid normal to palpation  RESP: lungs clear to auscultation - no " rales, rhonchi or wheezes  BREAST: normal without masses, tenderness or nipple discharge and no palpable axillary masses or adenopathy  CV: regular rate and rhythm, normal S1 S2, no S3 or S4, no murmur, click or rub, no peripheral edema and peripheral pulses strong  ABDOMEN: soft, nontender, no hepatosplenomegaly, no masses and bowel sounds normal   (female): normal female external genitalia, normal urethral meatus, vaginal mucosa pink, moist, well rugated, and normal cervix/adnexa/uterus without masses or discharge  MS: no gross musculoskeletal defects noted, no edema  SKIN: no suspicious lesions or rashes  NEURO: Normal strength and tone, mentation intact and speech normal  PSYCH: mentation appears normal, affect normal/bright    PROCEDURE:  Nexplanon Removal  Consent obtained  Time out performed  Betadine prep to area. 2 mL 1% Lidocaine with epinephrine was injected forming a wheel under the distal end of the implant on the Right upper arm  After skin anesthetized 11 blade used to make a 3mm incision over the distal end of the implant and dissect scar tissue surrounding the distal end.  A curved mosquito forceps used to pull implant and strait forceps used to stabilize.  Hemostasis achieved with light pressure.  Bandaid and guaze applied.  Pressure dressing applied.  Patient tolerated procedure well.  No complications.    Diagnostic Test Results:  none     ASSESSMENT/PLAN:   Teresa was seen today for physical.    Diagnoses and all orders for this visit:    Well woman exam with routine gynecological exam  -     Pap imaged thin layer screen with HPV - recommended age 30 - 65  -     HPV High Risk Types DNA Cervical    Nexplanon removal  -     REMOVAL NEXPLANON        COUNSELING:   Reviewed preventive health counseling, as reflected in patient instructions       Regular exercise       Healthy diet/nutrition       Vision screening       Family planning    BP Readings from Last 1 Encounters:   06/29/18 111/78  "    Estimated body mass index is 28.29 kg/(m^2) as calculated from the following:    Height as of this encounter: 5' 5\" (1.651 m).    Weight as of this encounter: 170 lb (77.1 kg).      Weight management plan: Discussed healthy diet and exercise guidelines and patient will follow up in 12 months in clinic to re-evaluate.     reports that she has never smoked. She has never used smokeless tobacco.      Counseling Resources:  ATP IV Guidelines  Pooled Cohorts Equation Calculator  Breast Cancer Risk Calculator  FRAX Risk Assessment  ICSI Preventive Guidelines  Dietary Guidelines for Americans, 2010  USDA's MyPlate  ASA Prophylaxis  Lung CA Screening    Mamadou Loving MD  [facility]  "

## 2018-06-29 NOTE — PATIENT INSTRUCTIONS
Just apply a Band-Aid daily until the skin is fully healed.  If red, hot, oozing then call clinic.    Start prenatal vitamin.  Stop the minoxidil    Preventive Health Recommendations  Female Ages 26 - 39  Yearly exam:   See your health care provider every year in order to    Review health changes.     Discuss preventive care.      Review your medicines if you your doctor has prescribed any.    Until age 30: Get a Pap test every three years (more often if you have had an abnormal result).    After age 30: Talk to your doctor about whether you should have a Pap test every 3 years or have a Pap test with HPV screening every 5 years.   You do not need a Pap test if your uterus was removed (hysterectomy) and you have not had cancer.  You should be tested each year for STDs (sexually transmitted diseases), if you're at risk.   Talk to your provider about how often to have your cholesterol checked.  If you are at risk for diabetes, you should have a diabetes test (fasting glucose).  Shots: Get a flu shot each year. Get a tetanus shot every 10 years.   Nutrition:     Eat at least 5 servings of fruits and vegetables each day.    Eat whole-grain bread, whole-wheat pasta and brown rice instead of white grains and rice.    Get adequate Calcium and Vitamin D.     Lifestyle    Exercise at least 150 minutes a week (30 minutes a day, 5 days of the week). This will help you control your weight and prevent disease.    Limit alcohol to one drink per day.    No smoking.     Wear sunscreen to prevent skin cancer.    See your dentist every six months for an exam and cleaning.

## 2018-06-29 NOTE — MR AVS SNAPSHOT
After Visit Summary   6/29/2018    Teresa Salinas    MRN: [id number]           Patient Information     Date Of Birth          1985        Visit Information        Provider Department      6/29/2018 2:00 PM Mamadou Loving MD [facility]        Today's Diagnoses     Well woman exam with routine gynecological exam    -  1    Nexplanon removal          Care Instructions    Just apply a Band-Aid daily until the skin is fully healed.  If red, hot, oozing then call clinic.    Start prenatal vitamin.  Stop the minoxidil    Preventive Health Recommendations  Female Ages 26 - 39  Yearly exam:   See your health care provider every year in order to    Review health changes.     Discuss preventive care.      Review your medicines if you your doctor has prescribed any.    Until age 30: Get a Pap test every three years (more often if you have had an abnormal result).    After age 30: Talk to your doctor about whether you should have a Pap test every 3 years or have a Pap test with HPV screening every 5 years.   You do not need a Pap test if your uterus was removed (hysterectomy) and you have not had cancer.  You should be tested each year for STDs (sexually transmitted diseases), if you're at risk.   Talk to your provider about how often to have your cholesterol checked.  If you are at risk for diabetes, you should have a diabetes test (fasting glucose).  Shots: Get a flu shot each year. Get a tetanus shot every 10 years.   Nutrition:     Eat at least 5 servings of fruits and vegetables each day.    Eat whole-grain bread, whole-wheat pasta and brown rice instead of white grains and rice.    Get adequate Calcium and Vitamin D.     Lifestyle    Exercise at least 150 minutes a week (30 minutes a day, 5 days of the week). This will help you control your weight and prevent disease.    Limit alcohol to one drink per day.    No smoking.     Wear sunscreen to prevent skin cancer.    See your dentist  "every six months for an exam and cleaning.            Follow-ups after your visit        Who to contact     If you have questions or need follow up information about today's clinic visit or your schedule please contact [facility] directly at [phone].  Normal or non-critical lab and imaging results will be communicated to you by [employer], letter or phone within 4 business days after the clinic has received the results. If you do not hear from us within 7 days, please contact the clinic through [employer] or phone. If you have a critical or abnormal lab result, we will notify you by phone as soon as possible.  Submit refill requests through [facility] or call your pharmacy and they will forward the refill request to us. Please allow 3 business days for your refill to be completed.          Additional Information About Your Visit        [employer] Information     [facility] gives you secure access to your electronic health record. If you see a primary care provider, you can also send messages to your care team and make appointments. If you have questions, please call your primary care clinic.  If you do not have a primary care provider, please call [phone] and they will assist you.        Care EveryWhere ID     This is your Care EveryWhere ID. This could be used by other organizations to access your [place] medical records  [id number]        Your Vitals Were     Pulse Temperature Respirations Height Pulse Oximetry BMI (Body Mass Index)    86 98.3  F (36.8  C) 18 5' 5\" (1.651 m) 98% 28.29 kg/m2       Blood Pressure from Last 3 Encounters:   06/29/18 111/78   07/05/17 125/66   04/26/17 142/67    Weight from Last 3 Encounters:   06/29/18 170 lb (77.1 kg)   07/05/17 175 lb 9.6 oz (79.7 kg)   04/26/17 177 lb (80.3 kg)              We Performed the Following     HPV High Risk Types DNA Cervical     Pap imaged thin layer screen with HPV - recommended age 30 - 65     REMOVAL NEXPLANON          Today's Medication " Changes          These changes are accurate as of 6/29/18  2:22 PM.  If you have any questions, ask your nurse or doctor.               Stop taking these medicines if you haven't already. Please contact your care team if you have questions.     etonogestrel 68 MG Impl   Commonly known as:  IMPLANON/NEXPLANON   Stopped by:  Mamadou Loving MD           minoxidil 5 % Soln   Stopped by:  Mamadou Loving MD                    Primary Care Provider Office Phone # Fax #    Kacie Killian TITA Vazquez [phone] [address] [facility] [address]        Equal Access to Services     [facility]: Hadii aad ku hadasho Soomaali, waaxda luqadaha, qaybta kaalmada adeegyada, waxrosalio mesa hayleelee macedo . So [facility] [phone].    ATENCIÓN: Si habla español, tiene a dorsey disposición servicios gratuitos de asistencia lingüística. [facility] [phone].    We comply with applicable federal civil rights laws and [place] laws. We do not discriminate on the basis of race, color, national origin, age, disability, sex, sexual orientation, or gender identity.            Thank you!     Thank you for choosing [facility]  for your care. Our goal is always to provide you with excellent care. Hearing back from our patients is one way we can continue to improve our services. Please take a few minutes to complete the written survey that you may receive in the mail after your visit with us. Thank you!             Your Updated Medication List - Protect others around you: Learn how to safely use, store and throw away your medicines at www.disposemymeds.org.          This list is accurate as of 6/29/18  2:22 PM.  Always use your most recent med list.                   Brand Name Dispense Instructions for use Diagnosis    albuterol 108 (90 Base) MCG/ACT Inhaler    PROAIR HFA/PROVENTIL HFA/VENTOLIN HFA    2 Inhaler    Inhale 2 puffs into the lungs every 6 hours as needed for shortness of breath  / dyspnea or wheezing    Mild intermittent asthma, with acute exacerbation, Other allergic rhinitis       BIOTIN 5000 5 MG Caps   Generic drug:  biotin     30 capsule         cetirizine 10 MG tablet    zyrTEC    30 tablet    Take 1 tablet (10 mg) by mouth every evening        Multi-vitamin Tabs tablet     100 tablet    Take 1 tablet by mouth daily        vitamin D 2000 units tablet     100 tablet    Take 2,000 Units by mouth daily

## 2018-06-30 ASSESSMENT — ASTHMA QUESTIONNAIRES: ACT_TOTALSCORE: 24

## 2018-07-03 LAB
COPATH REPORT: NORMAL
PAP: NORMAL

## 2018-07-06 LAB
FINAL DIAGNOSIS: NORMAL
HPV HR 12 DNA CVX QL NAA+PROBE: NEGATIVE
HPV16 DNA SPEC QL NAA+PROBE: NEGATIVE
HPV18 DNA SPEC QL NAA+PROBE: NEGATIVE
SPECIMEN DESCRIPTION: NORMAL
SPECIMEN SOURCE CVX/VAG CYTO: NORMAL

## 2018-09-10 ENCOUNTER — MYC REFILL (OUTPATIENT)
Dept: FAMILY MEDICINE | Facility: CLINIC | Age: 33
End: 2018-09-10

## 2018-09-10 DIAGNOSIS — J30.89 OTHER ALLERGIC RHINITIS: ICD-10-CM

## 2018-09-10 RX ORDER — ALBUTEROL SULFATE 90 UG/1
2 AEROSOL, METERED RESPIRATORY (INHALATION) EVERY 4 HOURS PRN
Qty: 1 INHALER | Refills: 6 | Status: CANCELLED | OUTPATIENT
Start: 2018-09-10

## 2018-09-10 NOTE — TELEPHONE ENCOUNTER
Message from [employer]:  Original authorizing provider: Mamadou Loving MD    Teresa Salinas would like a refill of the following medications:  albuterol (PROAIR HFA/PROVENTIL HFA/VENTOLIN HFA) 108 (90 Base) MCG/ACT Inhaler [Mamadou Loving MD]    Preferred pharmacy: [facility] [id number] [place] [address]    Comment:  I believe the last time this was dispensed, I received 2 inhalers, but if able, please only dispense 1 (they  before I use them otherwise). Thank you!

## 2018-11-13 ENCOUNTER — OFFICE VISIT (OUTPATIENT)
Dept: ALLERGY | Facility: CLINIC | Age: 33
End: 2018-11-13
Payer: COMMERCIAL

## 2018-11-13 VITALS
OXYGEN SATURATION: 99 % | HEIGHT: 65 IN | BODY MASS INDEX: 28.39 KG/M2 | WEIGHT: 170.42 LBS | HEART RATE: 89 BPM | DIASTOLIC BLOOD PRESSURE: 70 MMHG | SYSTOLIC BLOOD PRESSURE: 124 MMHG

## 2018-11-13 DIAGNOSIS — Z23 NEED FOR PNEUMOCOCCAL VACCINE: ICD-10-CM

## 2018-11-13 DIAGNOSIS — J45.20 MILD INTERMITTENT ASTHMA WITHOUT COMPLICATION: ICD-10-CM

## 2018-11-13 DIAGNOSIS — J31.0 CHRONIC RHINITIS: Primary | ICD-10-CM

## 2018-11-13 DIAGNOSIS — H10.13 ALLERGIC CONJUNCTIVITIS, BILATERAL: ICD-10-CM

## 2018-11-13 LAB
FEF 25/75: NORMAL
FEV-1: NORMAL
FEV1/FVC: NORMAL
FVC: NORMAL

## 2018-11-13 PROCEDURE — 90471 IMMUNIZATION ADMIN: CPT | Performed by: ALLERGY & IMMUNOLOGY

## 2018-11-13 PROCEDURE — 94010 BREATHING CAPACITY TEST: CPT | Performed by: ALLERGY & IMMUNOLOGY

## 2018-11-13 PROCEDURE — 99204 OFFICE O/P NEW MOD 45 MIN: CPT | Mod: 25 | Performed by: ALLERGY & IMMUNOLOGY

## 2018-11-13 PROCEDURE — 90732 PPSV23 VACC 2 YRS+ SUBQ/IM: CPT | Performed by: ALLERGY & IMMUNOLOGY

## 2018-11-13 RX ORDER — AZELASTINE 1 MG/ML
2 SPRAY, METERED NASAL 2 TIMES DAILY PRN
Qty: 30 ML | Refills: 3 | Status: SHIPPED | OUTPATIENT
Start: 2018-11-13 | End: 2019-01-16

## 2018-11-13 RX ORDER — AZELASTINE HYDROCHLORIDE 0.5 MG/ML
1 SOLUTION/ DROPS OPHTHALMIC 2 TIMES DAILY
Qty: 6 ML | Refills: 3 | Status: SHIPPED | OUTPATIENT
Start: 2018-11-13 | End: 2019-01-16

## 2018-11-13 RX ORDER — FLUNISOLIDE 0.25 MG/ML
2 SOLUTION NASAL EVERY 12 HOURS
Qty: 1 BOTTLE | Refills: 0 | Status: SHIPPED | OUTPATIENT
Start: 2018-11-13 | End: 2019-01-16

## 2018-11-13 ASSESSMENT — ENCOUNTER SYMPTOMS
JOINT SWELLING: 0
ARTHRALGIAS: 0
NAUSEA: 0
RHINORRHEA: 1
EYE REDNESS: 1
COUGH: 0
WHEEZING: 0
DIARRHEA: 0
HEADACHES: 0
FACIAL SWELLING: 0
SHORTNESS OF BREATH: 0
CHILLS: 0
EYE ITCHING: 1
EYE DISCHARGE: 1
ADENOPATHY: 0
MYALGIAS: 0
FEVER: 0
CHEST TIGHTNESS: 0
VOMITING: 0
ACTIVITY CHANGE: 0
SINUS PRESSURE: 0

## 2018-11-13 NOTE — PATIENT INSTRUCTIONS
Start flunisolide 2 sprays/nostril twice daily.  -Use azelastine 2 sprays in each nostril twice a day when necessary. Stop it 3 days before the skin test.  Stop cetirizine 7 days before th test.     Asthma management per asthma action plan.     If you get pregnant, let us know, will need to change the sprays.

## 2018-11-13 NOTE — LETTER
My Asthma Action Plan  Name: Teresa Salinas   YOB: 1985  Date: 11/13/2018   My doctor: Shun Martinez MD   My clinic: [facility]        My Control Medicine: None  My Rescue Medicine: Albuterol (Proair/Ventolin/Proventil) inhaler 2-4 puffs every 4 hrs as needed   My Asthma Severity: intermittent  Avoid your asthma triggers: smoke, upper respiratory infections, exercise or sports and cold air              GREEN ZONE   Good Control    I feel good    No cough or wheeze    Can work, sleep and play without asthma symptoms       Take your asthma control medicine every day.     1. If exercise triggers your asthma, take your rescue medication    15 minutes before exercise or sports, and    During exercise if you have asthma symptoms  2. Spacer to use with inhaler: If you have a spacer, make sure to use it with your inhaler             YELLOW ZONE Getting Worse  I have ANY of these:    I do not feel good    Cough or wheeze    Chest feels tight    Wake up at night   1. Keep taking your Green Zone medications  2. Start taking your rescue medicine:    every 20 minutes for up to 1 hour. Then every 4 hours for 24-48 hours.  3. If you stay in the Yellow Zone for more than 24-48 hours, contact your doctor.             RED ZONE Medical Alert - Get Help  I have ANY of these:    I feel awful    Medicine is not helping    Breathing getting harder    Trouble walking or talking    Nose opens wide to breathe       1. Take your rescue medicine NOW  2. If your provider has prescribed an oral steroid medicine, start taking it NOW  3. Call your doctor NOW  4. If you are still in the Red Zone after 20 minutes and you have not reached your doctor:    Take your rescue medicine again and    Call 911 or go to the emergency room right away    See your regular doctor within 2 weeks of an Emergency Room or Urgent Care visit for follow-up treatment.          Annual Reminders:  Meet with Asthma Educator,  Flu Shot in the  Fall, consider Pneumonia Vaccination for patients with asthma (aged 19 and older).    Pharmacy: [facility] [id number] [place] [address]                      Asthma Triggers  How To Control Things That Make Your Asthma Worse    Triggers are things that make your asthma worse.  Look at the list below to help you find your triggers and what you can do about them.  You can help prevent asthma flare-ups by staying away from your triggers.      Trigger                                                          What you can do   Cigarette Smoke  Tobacco smoke can make asthma worse. Do not allow smoking in your home, car or around you.  Be sure no one smokes at a child s day care or school.  If you smoke, ask your health care provider for ways to help you quit.  Ask family members to quit too.  Ask your health care provider for a referral to Quit Plan to help you quit smoking, or call 2-008-213-PLAN.     Colds, Flu, Bronchitis  These are common triggers of asthma. Wash your hands often.  Don t touch your eyes, nose or mouth.  Get a flu shot every year.     Dust Mites  These are tiny bugs that live in cloth or carpet. They are too small to see. Wash sheets and blankets in hot water every week.   Encase pillows and mattress in dust mite proof covers.  Avoid having carpet if you can. If you have carpet, vacuum weekly.   Use a dust mask and HEPA vacuum.   Pollen and Outdoor Mold  Some people are allergic to trees, grass, or weed pollen, or molds. Try to keep your windows closed.  Limit time out doors when pollen count is high.   Ask you health care provider about taking medicine during allergy season.     Animal Dander  Some people are allergic to skin flakes, urine or saliva from pets with fur or feathers. Keep pets with fur or feathers out of your home.    If you can t keep the pet outdoors, then keep the pet out of your bedroom.  Keep the bedroom door closed.  Keep pets off cloth furniture and away from  stuffed toys.     Mice, Rats, and Cockroaches  Some people are allergic to the waste from these pests.   Cover food and garbage.  Clean up spills and food crumbs.  Store grease in the refrigerator.   Keep food out of the bedroom.   Indoor Mold  This can be a trigger if your home has high moisture. Fix leaking faucets, pipes, or other sources of water.   Clean moldy surfaces.  Dehumidify basement if it is damp and smelly.   Smoke, Strong Odors, and Sprays  These can reduce air quality. Stay away from strong odors and sprays, such as perfume, powder, hair spray, paints, smoke incense, paint, cleaning products, candles and new carpet.   Exercise or Sports  Some people with asthma have this trigger. Be active!  Ask your doctor about taking medicine before sports or exercise to prevent symptoms.    Warm up for 5-10 minutes before and after sports or exercise.     Other Triggers of Asthma  Cold air:  Cover your nose and mouth with a scarf.  Sometimes laughing or crying can be a trigger.  Some medicines and food can trigger asthma.

## 2018-11-13 NOTE — LETTER
11/13/2018         RE: Teresa Salinas  [address] [facility] [address]        Dear Colleague,    Thank you for referring your patient, Teresa Salinas, to the [facility]. Please see a copy of my visit note below.    SUBJECTIVE:                                                               Teresa Salinas presents today to our Allergy Clinic at [facility] for a new patient visit.   She is a 33-year-old female with possible environmental allergies.  At the age of 8-9 years, she began to have perennial but seasonally-exacerbated (Summer) chronic nasal symptoms (itch, clear rhinorrhea, stuffiness, and sneezing), postnasal drip and ocular symptoms (itching and watering).  She is worse around cats but not the dos. Mowing grass, raking leaves, dust exposure, and temperature changes, seem to exacerbate the patient's symptoms.   Intranasal steroids (Flonase and Nasacort) have been used, and they were not effective. Last Flonase use was 1 year ago and Nasacort 6-7 months ago.   Oral antihistamines (cetirizine/loratadine/fexofenadine/diphenhydramine) have been used, and they were partially effective. The patient's current treatment regime includes cetirizine 10 mg by mouth once daily.   The patient symptoms are currently 30% controlled.  There is no history of PE tubes, sinus surgeries, or tonsillectomy/adenoidectomy.    She was tested approximately 12 years ago. She states she was positive for mold, pollen, and cat. She wasn't on allergen immunotherapy.     At the age of 11 years, she began to have episodes chest symptoms  (cough, wheeze, SOB and chest tightness), triggered by URIs, cold weather exposure, exertion, and cigarette smoke. In the past, she was on Flovent, but she stopped in 2-3 years. She is still well controlled without a controller medication.  Chest symptoms are acutely improved by albuterol use (several minutes).  No history of hospitalizations.  Teresa is using  albuterol HFA  less than twice per week for rescue from chest symptoms. She is waking up less than twice per month due to chest symptoms.  There has been no use of oral steroids for the last 12 months.  No ED/PCP/urgent care/other specialist visits for asthma flare for the last year. The patient denies current chest tightness, cough, wheeze or SOB.    Patient Active Problem List   Diagnosis     Allergic rhinitis     Mild intermittent asthma     Family history of thyroid disease       Past Medical History:   Diagnosis Date     Allergic rhinitis 3/11/2014     Asthma 3/11/2014     Family history of thyroid disease 6/12/2015      Problem (# of Occurrences) Relation (Name,Age of Onset)    Asthma (1) Sister    Colon Cancer (1) Maternal Grandfather    Depression (1) Father    Diabetes (1) Father    HEART DISEASE (1) Father    Hyperlipidemia (3) Father, Maternal Grandmother, Mother    Hypertension (1) Father    Seasonal/Environmental Allergies (3) Father, Mother, Sister    Thyroid Disease (2) Mother, Sister        Past Surgical History:   Procedure Laterality Date     Arthoscopic right ankle Right 1999     OSTEOTOMY ANKLE Right 2003     Social History     Social History     Marital status:      Spouse name: Braxton     Number of children: 0     Years of education: 16     Occupational History     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 No     Social History Narrative    November 13, 2018        ENVIRONMENTAL HISTORY: [place] the [place] The home is heated with a forced air and gas furnace. They do have central air conditioning. The patient's bedroom is furnished with feather/wool  bedding or pillows and carpeting in bedroom.  Pets inside the house include 1 dog(s). There is no history of cockroach or mice infestation. There is/are 0 smokers in the house.  The house does not have a damp basement.                Review of Systems   Constitutional: Negative for activity change, chills and fever.   HENT: Positive for congestion, postnasal drip, rhinorrhea and sneezing. Negative for dental problem, ear pain, facial swelling, nosebleeds and sinus pressure.    Eyes: Positive for discharge, redness and itching.   Respiratory: Negative for cough, chest tightness, shortness of breath and wheezing.    Cardiovascular: Negative for chest pain.   Gastrointestinal: Negative for diarrhea, nausea and vomiting.   Musculoskeletal: Negative for arthralgias, joint swelling and myalgias.   Skin: Negative for rash.   Allergic/Immunologic: Positive for environmental allergies.        Previously tested about 12 years ago in [place]   Neurological: Negative for headaches.   Hematological: Negative for adenopathy.   Psychiatric/Behavioral: Negative for behavioral problems and self-injury.       Current Outpatient Prescriptions:      albuterol (PROAIR HFA/PROVENTIL HFA/VENTOLIN HFA) 108 (90 Base) MCG/ACT Inhaler, Inhale 2 puffs into the lungs every 4 hours as needed for shortness of breath / dyspnea or wheezing, Disp: 1 Inhaler, Rfl: 6     azelastine (ASTELIN) 0.1 % nasal spray, Spray 2 sprays into both nostrils 2 times daily as needed for rhinitis or allergies, Disp: 30 mL, Rfl: 3     azelastine (OPTIVAR) 0.05 % ophthalmic solution, Place 1 drop into both eyes 2 times daily, Disp: 6 mL, Rfl: 3     cetirizine (ZYRTEC) 10 MG tablet, Take 1 tablet (10 mg) by mouth every evening, Disp: 30 tablet, Rfl: 1     Cholecalciferol (VITAMIN D) 2000 UNITS tablet, Take 2,000 Units by mouth daily, Disp: 100 tablet, Rfl: 3     Ferrous Sulfate (IRON SUPPLEMENT PO), , Disp: , Rfl:      flunisolide (NASALIDE) 25 MCG/ACT (0.025%) SOLN  "spray, Spray 2 sprays into both nostrils every 12 hours, Disp: 1 Bottle, Rfl: 0     multivitamin, therapeutic with minerals (MULTI-VITAMIN) TABS, Take 1 tablet by mouth daily, Disp: 100 tablet, Rfl: 3     biotin (BIOTIN 5000) 5 MG CAPS, , Disp: 30 capsule, Rfl:   Immunization History   Administered Date(s) Administered     HEPA 04/16/2004, 10/15/2004     HepA-Adult 03/27/2017     HepB 08/18/1998, 10/02/1998     Influenza (IIV3) PF 10/09/2009     Mantoux Tuberculin Skin Test 08/28/2009     Meningococcal (Bexsero ) 08/31/2005     TD (ADULT, 7+) 01/01/1996, 04/16/2004     TDAP Vaccine (Adacel) 06/01/2011, 08/18/2016     Typhoid IM 03/27/2017     Allergies   Allergen Reactions     Contrast Dye Shortness Of Breath     Clindamycin Hives     Diagnostic X-Ray Materials Itching     Itching, sneezing.     OBJECTIVE:                                                                 /70 (BP Location: Left arm, Patient Position: Sitting, Cuff Size: Adult Regular)  Pulse 89  Ht 1.645 m (5' 4.76\")  Wt 77.3 kg (170 lb 6.7 oz)  SpO2 99%  BMI 28.57 kg/m2        Physical Exam   Constitutional: She is oriented to person, place, and time. No distress.   HENT:   Head: Normocephalic and atraumatic.   Right Ear: Tympanic membrane, external ear and ear canal normal.   Left Ear: Tympanic membrane, external ear and ear canal normal.   Nose: No mucosal edema or rhinorrhea.   Mouth/Throat: Oropharynx is clear and moist and mucous membranes are normal.   Eyes: Conjunctivae are normal. Right eye exhibits no discharge. Left eye exhibits no discharge.   Neck: Normal range of motion.   Cardiovascular: Normal rate, regular rhythm and normal heart sounds.    No murmur heard.  Pulmonary/Chest: Effort normal and breath sounds normal. No respiratory distress. She has no wheezes. She has no rales.   Musculoskeletal: Normal range of motion.   Lymphadenopathy:     She has no cervical adenopathy.   Neurological: She is alert and oriented to person, " place, and time.   Skin: Skin is warm. No rash noted. She is not diaphoretic.   Psychiatric: Mood, memory and affect normal.   Nursing note and vitals reviewed.      WORKUP:     SPIROMETRY       FVC 3.99L (103% of predicted).     FEV1 3.33L (103% of predicted).     FEV1/FVC 83%     FEF 25%-75%  3.52L/s (101% of predicted)    The office spirometry performed today doesn't suggest an obstruction.    Asthma Control Test (ACT) total score: 24       ASSESSMENT/PLAN:      Problem List Items Addressed This Visit        Respiratory    1. Mild intermittent asthma    Well-controlled with albuterol inhaler on as needed basis.    -Continue as is.         Other Relevant Orders    Spirometry, Breathing Capacity: Normal Order, Clinic Performed (Completed)    OPTICHAMBER (Completed)    Pneumococcal vaccine 23 valent PPSV23  (Pneumovax) [23946] (Completed)      Other Visit Diagnoses     2. Chronic rhinitis    -  Primary  Unable to perform percutaneous skin puncture testing for aeroallergens because the patient did not stop oral antihistamines.  We are planning to see him back within the next 1-2 weeks for the test.  -Failed intranasal fluticasone and triamcinolone.  Start flunisolide 2 sprays in each nostril twice daily.  -Start azelastine 2 sprays in each nostril twice daily as needed.  She will need to stop with 3 days before the skin test.  -The patient was instructed to stop oral antihistamines 7 days before the skin test.      Relevant Medications    flunisolide (NASALIDE) 25 MCG/ACT (0.025%) SOLN spray    azelastine (ASTELIN) 0.1 % nasal spray    3. Allergic conjunctivitis, bilateral        A lot of patients reported improvement in ocular symptoms by using an intranasal corticosteroid and intranasal antihistamine.  Should the patient have more symptoms, Optivar 1 drop in each eye twice daily as needed can be used.        azelastine (OPTIVAR) 0.05 % ophthalmic solution    4. Need for pneumococcal vaccine        Relevant Orders     Vaccine Administration, Initial [39180] (Completed)    Pneumococcal vaccine 23 valent PPSV23  (Pneumovax) [[id number]] (Completed)            Return in about 2 weeks (around 11/26/2018), or if symptoms worsen or fail to improve.    Thank you for allowing us to participate in the care of this patient. Please feel free to contact us if there are any questions or concerns about the patient.    Disclaimer: This note consists of symbols derived from keyboarding, dictation and/or voice recognition software. As a result, there may be errors in the script that have gone undetected. Please consider this when interpreting information found in this chart.    Shun Martinez MD, [facility]  Allergy, Asthma and Immunology  [place], [place] and [place]rak[place]    Again, thank you for allowing me to participate in the care of your patient.        Sincerely,        Shun Martinez MD

## 2018-11-13 NOTE — MR AVS SNAPSHOT
After Visit Summary   11/13/2018    Teresa Salinas    MRN: [id number]           Patient Information     Date Of Birth          1985        Visit Information        Provider Department      11/13/2018 10:40 AM Shun Martinez MD [facility]        Today's Diagnoses     Chronic rhinitis    -  1    Allergic conjunctivitis, bilateral        Mild intermittent asthma without complication          Care Instructions    Start flunisolide 2 sprays/nostril twice daily.  -Use azelastine 2 sprays in each nostril twice a day when necessary. Stop it 3 days before the skin test.  Stop cetirizine 7 days before th test.     Asthma management per asthma action plan.     If you get pregnant, let us know, will need to change the sprays.              Follow-ups after your visit        Follow-up notes from your care team     Return in about 2 weeks (around 11/26/2018), or if symptoms worsen or fail to improve.      Your next 10 appointments already scheduled     Nov 26, 2018 10:00 AM CST   Return Visit with Shun Martinez MD   [facility]    [address] [facility] [address] [phone]              Who to contact     If you have questions or need follow up information about today's clinic visit or your schedule please contact [facility] directly at [phone].  Normal or non-critical lab and imaging results will be communicated to you by MyChart, letter or phone within 4 business days after the clinic has received the results. If you do not hear from us within 7 days, please contact the clinic through MyChart or phone. If you have a critical or abnormal lab result, we will notify you by phone as soon as possible.  Submit refill requests through [employer] or call your pharmacy and they will forward the refill request to us. Please allow 3 business days for your refill to be completed.          Additional Information About Your Visit    "     [employer] Information     [employer] gives you secure access to your electronic health record. If you see a primary care provider, you can also send messages to your care team and make appointments. If you have questions, please call your primary care clinic.  If you do not have a primary care provider, please call [phone] and they will assist you.        Care EveryWhere ID     This is your Care EveryWhere ID. This could be used by other organizations to access your [place] medical records  [id number]        Your Vitals Were     Pulse Height Pulse Oximetry BMI (Body Mass Index)          89 1.645 m (5' 4.76\") 99% 28.57 kg/m2         Blood Pressure from Last 3 Encounters:   11/13/18 124/70   06/29/18 111/78   07/05/17 125/66    Weight from Last 3 Encounters:   11/13/18 77.3 kg (170 lb 6.7 oz)   06/29/18 77.1 kg (170 lb)   07/05/17 79.7 kg (175 lb 9.6 oz)              We Performed the Following     OPTICHAMBER     Spirometry, Breathing Capacity: Normal Order, Clinic Performed          Today's Medication Changes          These changes are accurate as of 11/13/18 11:09 AM.  If you have any questions, ask your nurse or doctor.               Start taking these medicines.        Dose/Directions    azelastine 0.05 % ophthalmic solution   Commonly known as:  OPTIVAR   Used for:  Chronic rhinitis   Started by:  Shun Martinez MD        Dose:  1 drop   Place 1 drop into both eyes 2 times daily   Quantity:  6 mL   Refills:  3       azelastine 0.1 % nasal spray   Commonly known as:  ASTELIN   Used for:  Chronic rhinitis   Started by:  Shun Martinez MD        Dose:  2 spray   Spray 2 sprays into both nostrils 2 times daily as needed for rhinitis or allergies   Quantity:  30 mL   Refills:  3       flunisolide 25 MCG/ACT (0.025%) Soln spray   Commonly known as:  NASALIDE   Used for:  Allergic conjunctivitis, bilateral   Started by:  Shun Martinez MD        Dose:  2 spray   Spray 2 sprays into both nostrils every 12 " hours   Quantity:  1 Bottle   Refills:  0            Where to get your medicines      These medications were sent to [place] [id number] [place] [facility] [place] [address] [facility] [address]     Phone:  [phone]     azelastine 0.05 % ophthalmic solution    azelastine 0.1 % nasal spray    flunisolide 25 MCG/ACT (0.025%) Soln spray                Primary Care Provider Office Phone # Fax #    Kacie Killian TITA Vazquez [phone] [address] [facility] [address]        Equal Access to Services     [facility] : Hadii aad ku hadasho Soomaali, waaxda luqadaha, qaybta kaalmada adeegyada, gladys macedo . So [facility] [phone].    ATENCIÓN: Si habla español, tiene a dorsey disposición servicios gratuitos de asistencia lingüística. Llame al [phone].    We comply with applicable federal civil rights laws and [place] laws. We do not discriminate on the basis of race, color, national origin, age, disability, sex, sexual orientation, or gender identity.            Thank you!     Thank you for choosing [facility]  for your care. Our goal is always to provide you with excellent care. Hearing back from our patients is one way we can continue to improve our services. Please take a few minutes to complete the written survey that you may receive in the mail after your visit with us. Thank you!             Your Updated Medication List - Protect others around you: Learn how to safely use, store and throw away your medicines at www.disposemymeds.org.          This list is accurate as of 11/13/18 11:09 AM.  Always use your most recent med list.                   Brand Name Dispense Instructions for use Diagnosis    albuterol 108 (90 Base) MCG/ACT inhaler    PROAIR HFA/PROVENTIL HFA/VENTOLIN HFA    1 Inhaler    Inhale 2 puffs into the lungs every 4 hours as needed for shortness of breath / dyspnea or wheezing    Other allergic rhinitis        azelastine 0.05 % ophthalmic solution    OPTIVAR    6 mL    Place 1 drop into both eyes 2 times daily    Chronic rhinitis       azelastine 0.1 % nasal spray    ASTELIN    30 mL    Spray 2 sprays into both nostrils 2 times daily as needed for rhinitis or allergies    Chronic rhinitis       BIOTIN 5000 5 MG Caps   Generic drug:  biotin     30 capsule         cetirizine 10 MG tablet    zyrTEC    30 tablet    Take 1 tablet (10 mg) by mouth every evening        flunisolide 25 MCG/ACT (0.025%) Soln spray    NASALIDE    1 Bottle    Spray 2 sprays into both nostrils every 12 hours    Allergic conjunctivitis, bilateral       IRON SUPPLEMENT PO           Multi-vitamin Tabs tablet     100 tablet    Take 1 tablet by mouth daily        vitamin D 2000 units tablet     100 tablet    Take 2,000 Units by mouth daily

## 2018-11-13 NOTE — PROGRESS NOTES
SUBJECTIVE:                                                               Teresa Salinas presents today to our Allergy Clinic at [facility] for a new patient visit.   She is a 33-year-old female with possible environmental allergies.  At the age of 8-9 years, she began to have perennial but seasonally-exacerbated (Summer) chronic nasal symptoms (itch, clear rhinorrhea, stuffiness, and sneezing), postnasal drip and ocular symptoms (itching and watering).  She is worse around cats but not the dogs. Mowing grass, raking leaves, dust exposure, and temperature changes, seem to exacerbate the patient's symptoms.   Intranasal steroids (Flonase and Nasacort) have been used, and they were not effective. Last Flonase use was 1 year ago and Nasacort 6-7 months ago.   Oral antihistamines (cetirizine/loratadine/fexofenadine/diphenhydramine) have been used, and they were partially effective. The patient's current treatment regime includes cetirizine 10 mg by mouth once daily.   The patient symptoms are currently 30% controlled.  There is no history of PE tubes, sinus surgeries, or tonsillectomy/adenoidectomy.    She was tested approximately 12 years ago. She states she was positive for mold, pollen, and cat. She wasn't on allergen immunotherapy.     At the age of 11 years, she began to have episodes chest symptoms  (cough, wheeze, SOB and chest tightness), triggered by URIs, cold weather exposure, exertion, and cigarette smoke. In the past, she was on Flovent, but she stopped in 2-3 years. She is still well controlled without a controller medication.  Chest symptoms are acutely improved by albuterol use (several minutes).  No history of hospitalizations.  Teresa is using albuterol HFA  less than twice per week for rescue from chest symptoms. She is waking up less than twice per month due to chest symptoms.  There has been no use of oral steroids for the last 12 months.  No ED/PCP/urgent care/other specialist  visits for asthma flare for the last year. The patient denies current chest tightness, cough, wheeze or SOB.    Patient Active Problem List   Diagnosis     Allergic rhinitis     Mild intermittent asthma     Family history of thyroid disease       Past Medical History:   Diagnosis Date     Allergic rhinitis 3/11/2014     Asthma 3/11/2014     Family history of thyroid disease 6/12/2015      Problem (# of Occurrences) Relation (Name,Age of Onset)    Asthma (1) Sister    Colon Cancer (1) Maternal Grandfather    Depression (1) Father    Diabetes (1) Father    HEART DISEASE (1) Father    Hyperlipidemia (3) Father, Maternal Grandmother, Mother    Hypertension (1) Father    Seasonal/Environmental Allergies (3) Father, Mother, Sister    Thyroid Disease (2) Mother, Sister        Past Surgical History:   Procedure Laterality Date     Arthoscopic right ankle Right 1999     OSTEOTOMY ANKLE Right 2003     Social History     Social History     Marital status:      Spouse name: Braxton     Number of children: 0     Years of education: 16     Occupational History     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 No     Social History Narrative    November 13, 2018        ENVIRONMENTAL HISTORY: [place] the [place] The home is heated with a forced air and gas furnace. They do have central air conditioning. The patient's bedroom is furnished with feather/wool bedding or pillows and carpeting in bedroom.  Pets inside the house include 1 dog(s). There is no history of cockroach or mice infestation. There is/are 0 smokers in the house.  The house does not have a damp basement.                Review of  Systems   Constitutional: Negative for activity change, chills and fever.   HENT: Positive for congestion, postnasal drip, rhinorrhea and sneezing. Negative for dental problem, ear pain, facial swelling, nosebleeds and sinus pressure.    Eyes: Positive for discharge, redness and itching.   Respiratory: Negative for cough, chest tightness, shortness of breath and wheezing.    Cardiovascular: Negative for chest pain.   Gastrointestinal: Negative for diarrhea, nausea and vomiting.   Musculoskeletal: Negative for arthralgias, joint swelling and myalgias.   Skin: Negative for rash.   Allergic/Immunologic: Positive for environmental allergies.        Previously tested about 12 years ago in [place]   Neurological: Negative for headaches.   Hematological: Negative for adenopathy.   Psychiatric/Behavioral: Negative for behavioral problems and self-injury.       Current Outpatient Prescriptions:      albuterol (PROAIR HFA/PROVENTIL HFA/VENTOLIN HFA) 108 (90 Base) MCG/ACT Inhaler, Inhale 2 puffs into the lungs every 4 hours as needed for shortness of breath / dyspnea or wheezing, Disp: 1 Inhaler, Rfl: 6     azelastine (ASTELIN) 0.1 % nasal spray, Spray 2 sprays into both nostrils 2 times daily as needed for rhinitis or allergies, Disp: 30 mL, Rfl: 3     azelastine (OPTIVAR) 0.05 % ophthalmic solution, Place 1 drop into both eyes 2 times daily, Disp: 6 mL, Rfl: 3     cetirizine (ZYRTEC) 10 MG tablet, Take 1 tablet (10 mg) by mouth every evening, Disp: 30 tablet, Rfl: 1     Cholecalciferol (VITAMIN D) 2000 UNITS tablet, Take 2,000 Units by mouth daily, Disp: 100 tablet, Rfl: 3     Ferrous Sulfate (IRON SUPPLEMENT PO), , Disp: , Rfl:      flunisolide (NASALIDE) 25 MCG/ACT (0.025%) SOLN spray, Spray 2 sprays into both nostrils every 12 hours, Disp: 1 Bottle, Rfl: 0     multivitamin, therapeutic with minerals (MULTI-VITAMIN) TABS, Take 1 tablet by mouth daily, Disp: 100 tablet, Rfl: 3     biotin (BIOTIN 5000) 5 MG CAPS, , Disp:  "30 capsule, Rfl:   Immunization History   Administered Date(s) Administered     HEPA 04/16/2004, 10/15/2004     HepA-Adult 03/27/2017     HepB 08/18/1998, 10/02/1998     Influenza (IIV3) PF 10/09/2009     Mantoux Tuberculin Skin Test 08/28/2009     Meningococcal (Bexsero ) 08/31/2005     TD (ADULT, 7+) 01/01/1996, 04/16/2004     TDAP Vaccine (Adacel) 06/01/2011, 08/18/2016     Typhoid IM 03/27/2017     Allergies   Allergen Reactions     Contrast Dye Shortness Of Breath     Clindamycin Hives     Diagnostic X-Ray Materials Itching     Itching, sneezing.     OBJECTIVE:                                                                 /70 (BP Location: Left arm, Patient Position: Sitting, Cuff Size: Adult Regular)  Pulse 89  Ht 1.645 m (5' 4.76\")  Wt 77.3 kg (170 lb 6.7 oz)  SpO2 99%  BMI 28.57 kg/m2        Physical Exam   Constitutional: She is oriented to person, place, and time. No distress.   HENT:   Head: Normocephalic and atraumatic.   Right Ear: Tympanic membrane, external ear and ear canal normal.   Left Ear: Tympanic membrane, external ear and ear canal normal.   Nose: No mucosal edema or rhinorrhea.   Mouth/Throat: Oropharynx is clear and moist and mucous membranes are normal.   Eyes: Conjunctivae are normal. Right eye exhibits no discharge. Left eye exhibits no discharge.   Neck: Normal range of motion.   Cardiovascular: Normal rate, regular rhythm and normal heart sounds.    No murmur heard.  Pulmonary/Chest: Effort normal and breath sounds normal. No respiratory distress. She has no wheezes. She has no rales.   Musculoskeletal: Normal range of motion.   Lymphadenopathy:     She has no cervical adenopathy.   Neurological: She is alert and oriented to person, place, and time.   Skin: Skin is warm. No rash noted. She is not diaphoretic.   Psychiatric: Mood, memory and affect normal.   Nursing note and vitals reviewed.      WORKUP:     SPIROMETRY       FVC 3.99L (103% of predicted).     FEV1 3.33L " (103% of predicted).     FEV1/FVC 83%     FEF 25%-75%  3.52L/s (101% of predicted)    The office spirometry performed today doesn't suggest an obstruction.    Asthma Control Test (ACT) total score: 24       ASSESSMENT/PLAN:      Problem List Items Addressed This Visit        Respiratory    1. Mild intermittent asthma    Well-controlled with albuterol inhaler on as needed basis.    -Continue as is.         Other Relevant Orders    Spirometry, Breathing Capacity: Normal Order, Clinic Performed (Completed)    OPTICHAMBER (Completed)    Pneumococcal vaccine 23 valent PPSV23  (Pneumovax) [23218] (Completed)      Other Visit Diagnoses     2. Chronic rhinitis    -  Primary  Unable to perform percutaneous skin puncture testing for aeroallergens because the patient did not stop oral antihistamines.  We are planning to see him back within the next 1-2 weeks for the test.  -Failed intranasal fluticasone and triamcinolone.  Start flunisolide 2 sprays in each nostril twice daily.  -Start azelastine 2 sprays in each nostril twice daily as needed.  She will need to stop with 3 days before the skin test.  -The patient was instructed to stop oral antihistamines 7 days before the skin test.      Relevant Medications    flunisolide (NASALIDE) 25 MCG/ACT (0.025%) SOLN spray    azelastine (ASTELIN) 0.1 % nasal spray    3. Allergic conjunctivitis, bilateral        A lot of patients reported improvement in ocular symptoms by using an intranasal corticosteroid and intranasal antihistamine.  Should the patient have more symptoms, Optivar 1 drop in each eye twice daily as needed can be used.        azelastine (OPTIVAR) 0.05 % ophthalmic solution    4. Need for pneumococcal vaccine        Relevant Orders    Vaccine Administration, Initial [39043] (Completed)    Pneumococcal vaccine 23 valent PPSV23  (Pneumovax) [26564] (Completed)            Return in about 2 weeks (around 11/26/2018), or if symptoms worsen or fail to improve.    Thank you  for allowing us to participate in the care of this patient. Please feel free to contact us if there are any questions or concerns about the patient.    Disclaimer: This note consists of symbols derived from keyboarding, dictation and/or voice recognition software. As a result, there may be errors in the script that have gone undetected. Please consider this when interpreting information found in this chart.    Shun Martinez MD, FACI  Allergy, Asthma and Immunology  [place], [place] and [place]

## 2018-11-13 NOTE — NURSING NOTE
Screening Questionnaire for Adult Immunization    Are you sick today?   No   Do you have allergies to medications, food, a vaccine component or latex?   No   Have you ever had a serious reaction after receiving a vaccination?   No   Do you have a long-term health problem with heart disease, lung disease, asthma, kidney disease, metabolic disease (e.g. diabetes), anemia, or other blood disorder?   No   Do you have cancer, leukemia, HIV/AIDS, or any other immune system problem?   No   In the past 3 months, have you taken medications that affect  your immune system, such as prednisone, other steroids, or anticancer drugs; drugs for the treatment of rheumatoid arthritis, Crohn s disease, or psoriasis; or have you had radiation treatments?   No   Have you had a seizure, or a brain or other nervous system problem?   No   During the past year, have you received a transfusion of blood or blood     products, or been given immune (gamma) globulin or antiviral drug?   No   For women: Are you pregnant or is there a chance you could become        pregnant during the next month?   No   Have you received any vaccinations in the past 4 weeks?   No     Immunization questionnaire answers were all negative.        Per orders of Dr. Martinez, injection of PneumoVax 23 given by Anita Arechiga. Patient instructed to remain in clinic for 15 minutes afterwards, and to report any adverse reaction to me immediately.       Screening performed by Anita Arechiga on 11/13/2018 at 11:26 AM.

## 2018-11-14 ASSESSMENT — ASTHMA QUESTIONNAIRES: ACT_TOTALSCORE: 24

## 2018-11-26 ENCOUNTER — OFFICE VISIT (OUTPATIENT)
Dept: ALLERGY | Facility: CLINIC | Age: 33
End: 2018-11-26
Payer: COMMERCIAL

## 2018-11-26 VITALS
SYSTOLIC BLOOD PRESSURE: 119 MMHG | TEMPERATURE: 98 F | RESPIRATION RATE: 16 BRPM | DIASTOLIC BLOOD PRESSURE: 79 MMHG | OXYGEN SATURATION: 97 % | HEART RATE: 81 BPM

## 2018-11-26 DIAGNOSIS — J30.1 SEASONAL ALLERGIC RHINITIS DUE TO POLLEN: Primary | ICD-10-CM

## 2018-11-26 DIAGNOSIS — J30.81 ALLERGIC RHINITIS DUE TO ANIMALS: ICD-10-CM

## 2018-11-26 PROCEDURE — 86003 ALLG SPEC IGE CRUDE XTRC EA: CPT | Performed by: ALLERGY & IMMUNOLOGY

## 2018-11-26 PROCEDURE — 36415 COLL VENOUS BLD VENIPUNCTURE: CPT | Performed by: ALLERGY & IMMUNOLOGY

## 2018-11-26 PROCEDURE — 99207 ZZC DROP WITH A PROCEDURE: CPT | Performed by: ALLERGY & IMMUNOLOGY

## 2018-11-26 PROCEDURE — 95004 PERQ TESTS W/ALRGNC XTRCS: CPT | Performed by: ALLERGY & IMMUNOLOGY

## 2018-11-26 RX ORDER — EPINEPHRINE 0.3 MG/.3ML
0.3 INJECTION SUBCUTANEOUS PRN
Qty: 0.6 ML | Refills: 2 | Status: SHIPPED | OUTPATIENT
Start: 2018-11-26 | End: 2021-11-10

## 2018-11-26 ASSESSMENT — ENCOUNTER SYMPTOMS
RHINORRHEA: 1
ARTHRALGIAS: 0
ACTIVITY CHANGE: 0
JOINT SWELLING: 0
EYE ITCHING: 0
HEADACHES: 0
EYE REDNESS: 0
DIARRHEA: 0
CHILLS: 0
SINUS PRESSURE: 0
WHEEZING: 0
CHEST TIGHTNESS: 0
VOMITING: 0
EYE DISCHARGE: 0
SHORTNESS OF BREATH: 0
COUGH: 0
ADENOPATHY: 0
NAUSEA: 0
FACIAL SWELLING: 0
FEVER: 0
MYALGIAS: 0

## 2018-11-26 NOTE — NURSING NOTE
Per provider verbal order, RN placed Adult Environmental Panel scratch testing panels.  Consent was obtained prior to procedure.  Once panels were placed, patient was monitored for 15 minutes in clinic.  RN read test after 15 minutes and provider was notified of results.  Pt tolerated procedure well.  All questions and concerns were addressed at office visit.     Anita GALDAMEZ   Allergy RN

## 2018-11-26 NOTE — PROGRESS NOTES
SUBJECTIVE:                                                                 Teresa Salinas is a 33 year old female presenting today to our Allergy Clinic at  [facility], for percutaneous skin puncture testing for aeroallergens.     She tried flunisolide and she finds it helpful. After stopping cetirizine she feels itchy though.   Didn't like azelastine but thought it was helpful. She stropped it more than 3 days ago.     Patient Active Problem List   Diagnosis     Allergic rhinitis     Mild intermittent asthma     Family history of thyroid disease       Past Medical History:   Diagnosis Date     Allergic rhinitis 3/11/2014     Asthma 3/11/2014     Family history of thyroid disease 6/12/2015      Problem (# of Occurrences) Relation (Name,Age of Onset)    Asthma (1) Sister    Colon Cancer (1) Maternal Grandfather    Depression (1) Father    Diabetes (1) Father    HEART DISEASE (1) Father    Hyperlipidemia (3) Father, Maternal Grandmother, Mother    Hypertension (1) Father    Seasonal/Environmental Allergies (3) Father, Mother, Sister    Thyroid Disease (2) Mother, Sister        Past Surgical History:   Procedure Laterality Date     Arthoscopic right ankle Right 1999     OSTEOTOMY ANKLE Right 2003     Social History     Social History     Marital status:      Spouse name: Braxton     Number of children: 0     Years of education: 16     Occupational History     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 No     Social History Narrative    November 26, 2018            ENVIRONMENTAL HISTORY: [place] The home is heated with a forced air and gas furnace.  They do have central air conditioning. The patient's bedroom is furnished with feather/wool bedding or pillows and carpeting in bedroom.  Pets inside the house include 1 dog(s). There is no history of cockroach or mice infestation. There is/are 0 smokers in the house.  The house does not have a damp basement.                Review of Systems   Constitutional: Negative for activity change, chills and fever.   HENT: Positive for congestion, postnasal drip and rhinorrhea. Negative for dental problem, ear pain, facial swelling, nosebleeds, sinus pressure and sneezing.    Eyes: Negative for discharge, redness and itching.   Respiratory: Negative for cough, chest tightness, shortness of breath and wheezing.    Cardiovascular: Negative for chest pain.   Gastrointestinal: Negative for diarrhea, nausea and vomiting.   Musculoskeletal: Negative for arthralgias, joint swelling and myalgias.   Skin: Negative for rash.   Neurological: Negative for headaches.   Hematological: Negative for adenopathy.   Psychiatric/Behavioral: Negative for behavioral problems and self-injury.           Current Outpatient Prescriptions:      azelastine (OPTIVAR) 0.05 % ophthalmic solution, Place 1 drop into both eyes 2 times daily, Disp: 6 mL, Rfl: 3     Cholecalciferol (VITAMIN D) 2000 UNITS tablet, Take 2,000 Units by mouth daily, Disp: 100 tablet, Rfl: 3     EPINEPHrine (AUVI-Q) 0.3 MG/0.3ML injection 2-pack, Inject 0.3 mLs (0.3 mg) into the muscle as needed for anaphylaxis, Disp: 0.6 mL, Rfl: 2     Ferrous Sulfate (IRON SUPPLEMENT PO), , Disp: , Rfl:      flunisolide (NASALIDE) 25 MCG/ACT (0.025%) SOLN spray, Spray 2 sprays into both nostrils every 12 hours, Disp: 1 Bottle, Rfl: 0     multivitamin, therapeutic with minerals (MULTI-VITAMIN) TABS, Take 1 tablet by mouth daily, Disp: 100 tablet, Rfl: 3     albuterol (PROAIR HFA/PROVENTIL HFA/VENTOLIN HFA) 108 (90 Base) MCG/ACT Inhaler, Inhale 2 puffs into the lungs every 4 hours as needed for  shortness of breath / dyspnea or wheezing, Disp: 1 Inhaler, Rfl: 6     azelastine (ASTELIN) 0.1 % nasal spray, Spray 2 sprays into both nostrils 2 times daily as needed for rhinitis or allergies (Patient not taking: Reported on 11/26/2018), Disp: 30 mL, Rfl: 3     biotin (BIOTIN 5000) 5 MG CAPS, , Disp: 30 capsule, Rfl:      cetirizine (ZYRTEC) 10 MG tablet, Take 1 tablet (10 mg) by mouth every evening, Disp: 30 tablet, Rfl: 1  Immunization History   Administered Date(s) Administered     HEPA 04/16/2004, 10/15/2004     HepA-Adult 03/27/2017     HepB 08/18/1998, 10/02/1998     Influenza (IIV3) PF 10/09/2009     Mantoux Tuberculin Skin Test 08/28/2009     Meningococcal (Bexsero ) 08/31/2005     Pneumococcal 23 valent 11/13/2018     TD (ADULT, 7+) 01/01/1996, 04/16/2004     TDAP Vaccine (Adacel) 06/01/2011, 08/18/2016     Typhoid IM 03/27/2017     Allergies   Allergen Reactions     Contrast Dye Shortness Of Breath     Clindamycin Hives     Diagnostic X-Ray Materials Itching     Itching, sneezing.     OBJECTIVE:                                                                 /79 (BP Location: Left arm, Patient Position: Sitting, Cuff Size: Adult Regular)  Pulse 81  Temp 98  F (36.7  C) (Tympanic)  Resp 16  SpO2 97%        Physical Exam          WORKUP:   ENVIRONMENTAL PERCUTANEOUS SKIN TESTING: ADULT  [place] [facility] 11/26/2018   Consent Y   Ordering Physician Juan   Interpreting Physician Juan   Testing Technician Anita GALDAMEZ RN   Location Back   Time start: 10:08 AM   Time End: 10:23 AM   Positive Control: Histatrol*ALK 1 mg/ml 4/22   Negative Control: 50% Glycerin 0/0   Cat Hair*ALK (10,000 BAU/ml) 0/0   AP Dog Hair/Dander (1:100 w/v) 5/24   Dust Mite p. 30,000 AU/ml 0/0   Dust Mite f. (30,000 AU/ml) 0/0   Shahbaz (W/F in millimeters) 0/0   Grass Mix #7 (100,000 BAU/ml) 9/40   Red Cedar (W/F in millimeters) 0/0   Maple/[place] (W/F in millimeters) 0/0   Hackberry (W/F in millimeters) 0/0    Hickory (W/F in millimeters) 0/0   [place] *ALK (W/F in millimeters) 0/0   American Elm (W/F in millimeters) 0/0   [place] (W/F in millimeters) 0/0   Black [place] (W/F in millimeters) 0/0   Birch Mix (W/F in millimeters) 0/0   [place] (W/F in millimeters) 0/0   Oak (W/F in millimeters) 0/0   [place] (W/F in millimeters) 0/0   [place] (W/F in millimeters) 0/0   White Elia (W/F in millimeters) 0/0   Careless (W/F in millimeters) 0/0   Nettle (W/F in millimeters) 0/0   English Plantain (W/F in millimeters) 0/0   Kochia (W/F in millimeters) 0/0   Lamb's Quarter (W/F in millimeters) 0/0   Marshelder (W/F in millimeters) 0/0   Ragweed Mix* ALK (W/F in millimeters) 0/0   Russian Thistle (W/F in millimeters) 0/0   Sagebrush/Mugwort (W/F in millimeters) 0/0   Sheep Sorrel (W/F in millimeters) 0/0   Feather Mix* ALK (W/F in millimeters) 0/0   Penicillium Mix (1:10 w/v) 0/0   Curvularia spicifera (1:10 w/v) 0/0   Epicoccum (1:10 w/v) 0/0   Aspergillus fumigatus (1:10 w/v): 0/0   Alternaria tenius (1:10 w/v) 0/0   H. Cladosporium (1:10 w/v) 0/0   Phoma herbarum (1:10 w/v) 0/0        Percutaneous skin puncture testing for aeroallergens performed today (November 26, 2018) showed sensitivity to dog and pollen of grass mix #7.  She was the rest was negative with appropriate responses to positive and negative controls.    ASSESSMENT/PLAN:    No problems updated.  Problem List Items Addressed This Visit        Respiratory    Allergic rhinitis - Primary  Avoidance measures discussed and information provided.  -Continue flunisolide.  -Restart cetirizine 10 mg by mouth daily as needed.  -Use azelastine 2 sprays in each nostril twice daily as needed.   The patient is interested in allergen immunotherapy; however, she is surprised to see that the skin test was negative for cats.  She states that she is symptomatic around the cats, and in the past, skin test was positive.  -I ordered serum IgE for cat and dust mite.  If negative,  I will include that in allergen immunotherapy prescription.     The patient was counseled regarding the time commitment, auto injectable epinephrine device policy, risks, and benefits of  allergen immunotherapy and she wishes to proceed.    Relevant Medications    EPINEPHrine (AUVI-Q) 0.3 MG/0.3ML injection 2-pack    Other Relevant Orders    ALLERGY SKIN TESTS,ALLERGENS (Completed)      Other Visit Diagnoses     Allergic rhinitis due to animals        Relevant Medications    EPINEPHrine (AUVI-Q) 0.3 MG/0.3ML injection 2-pack    Other Relevant Orders    ALLERGY SKIN TESTS,ALLERGENS (Completed)    Allergen cat epithellium IgE    Allergen D farinae IgE    Allergen D pteronyssinus IgE            Return in about 3 months (around 2/26/2019), or if symptoms worsen or fail to improve.    Thank you for allowing us to participate in the care of this patient. Please feel free to contact us if there are any questions or concerns about the patient.    Disclaimer: This note consists of symbols derived from keyboarding, dictation and/or voice recognition software. As a result, there may be errors in the script that have gone undetected. Please consider this when interpreting information found in this chart.    Shun Martinez MD, FACAAI  Allergy, Asthma and Immunology  [place], [place] and [place]

## 2018-11-26 NOTE — LETTER
11/26/2018         RE: Teresa Salinas  [id number] [facility] [address]        Dear Colleague,    Thank you for referring your patient, Teresa Salinas, to the [facility]. Please see a copy of my visit note below.    SUBJECTIVE:                                                                 Teresa Salinas is a 33 year old female presenting today to our Allergy Clinic at  [facility], for percutaneous skin puncture testing for aeroallergens.     She tried flunisolide and she finds it helpful. After stopping cetirizine she feels itchy though.   Didn't like azelastine but thought it was helpful. She stropped it more than 3 days ago.     Patient Active Problem List   Diagnosis     Allergic rhinitis     Mild intermittent asthma     Family history of thyroid disease       Past Medical History:   Diagnosis Date     Allergic rhinitis 3/11/2014     Asthma 3/11/2014     Family history of thyroid disease 6/12/2015      Problem (# of Occurrences) Relation (Name,Age of Onset)    Asthma (1) Sister    Colon Cancer (1) Maternal Grandfather    Depression (1) Father    Diabetes (1) Father    HEART DISEASE (1) Father    Hyperlipidemia (3) Father, Maternal Grandmother, Mother    Hypertension (1) Father    Seasonal/Environmental Allergies (3) Father, Mother, Sister    Thyroid Disease (2) Mother, Sister        Past Surgical History:   Procedure Laterality Date     Arthoscopic right ankle Right 1999     OSTEOTOMY ANKLE Right 2003     Social History     Social History     Marital status:      Spouse name: Braxton     Number of children: 0     Years of education: 16     Occupational History     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 No     Social History Narrative    November 26, 2018            ENVIRONMENTAL HISTORY: [place] The home is heated with a forced air and gas furnace. They do have central air conditioning. The patient's bedroom is furnished with feather/wool bedding or pillows and carpeting in bedroom.  Pets inside the house include 1 dog(s). There is no history of cockroach or mice infestation. There is/are 0 smokers in the house.  The house does not have a damp basement.                Review of Systems   Constitutional: Negative for activity change, chills and fever.   HENT: Positive for congestion, postnasal drip and rhinorrhea. Negative for dental problem, ear pain, facial swelling, nosebleeds, sinus pressure and sneezing.    Eyes: Negative for discharge, redness and itching.   Respiratory: Negative for cough, chest tightness, shortness of breath and wheezing.    Cardiovascular: Negative for chest pain.   Gastrointestinal: Negative for diarrhea, nausea and vomiting.   Musculoskeletal: Negative for arthralgias, joint swelling and myalgias.   Skin: Negative for rash.   Neurological: Negative for headaches.   Hematological: Negative for adenopathy.   Psychiatric/Behavioral: Negative for behavioral problems and self-injury.           Current Outpatient Prescriptions:      azelastine (OPTIVAR) 0.05 % ophthalmic solution, Place 1 drop into both eyes 2 times daily, Disp: 6 mL, Rfl: 3     Cholecalciferol (VITAMIN D) 2000 UNITS tablet, Take 2,000 Units by mouth daily, Disp: 100 tablet, Rfl: 3     EPINEPHrine (AUVI-Q) 0.3 MG/0.3ML injection 2-pack, Inject 0.3 mLs (0.3 mg) into the muscle as needed for anaphylaxis, Disp: 0.6 mL, Rfl: 2     Ferrous Sulfate (IRON SUPPLEMENT PO), , Disp: , Rfl:      flunisolide (NASALIDE) 25 MCG/ACT (0.025%) SOLN spray, Spray 2 sprays into both nostrils every 12 hours, Disp: 1 Bottle, Rfl: 0     multivitamin,  therapeutic with minerals (MULTI-VITAMIN) TABS, Take 1 tablet by mouth daily, Disp: 100 tablet, Rfl: 3     albuterol (PROAIR HFA/PROVENTIL HFA/VENTOLIN HFA) 108 (90 Base) MCG/ACT Inhaler, Inhale 2 puffs into the lungs every 4 hours as needed for shortness of breath / dyspnea or wheezing, Disp: 1 Inhaler, Rfl: 6     azelastine (ASTELIN) 0.1 % nasal spray, Spray 2 sprays into both nostrils 2 times daily as needed for rhinitis or allergies (Patient not taking: Reported on 11/26/2018), Disp: 30 mL, Rfl: 3     biotin (BIOTIN 5000) 5 MG CAPS, , Disp: 30 capsule, Rfl:      cetirizine (ZYRTEC) 10 MG tablet, Take 1 tablet (10 mg) by mouth every evening, Disp: 30 tablet, Rfl: 1  Immunization History   Administered Date(s) Administered     HEPA 04/16/2004, 10/15/2004     HepA-Adult 03/27/2017     HepB 08/18/1998, 10/02/1998     Influenza (IIV3) PF 10/09/2009     Mantoux Tuberculin Skin Test 08/28/2009     Meningococcal (Bexsero ) 08/31/2005     Pneumococcal 23 valent 11/13/2018     TD (ADULT, 7+) 01/01/1996, 04/16/2004     TDAP Vaccine (Adacel) 06/01/2011, 08/18/2016     Typhoid IM 03/27/2017     Allergies   Allergen Reactions     Contrast Dye Shortness Of Breath     Clindamycin Hives     Diagnostic X-Ray Materials Itching     Itching, sneezing.     OBJECTIVE:                                                                 /79 (BP Location: Left arm, Patient Position: Sitting, Cuff Size: Adult Regular)  Pulse 81  Temp 98  F (36.7  C) (Tympanic)  Resp 16  SpO2 97%        Physical Exam          WORKUP:   ENVIRONMENTAL PERCUTANEOUS SKIN TESTING: ADULT  [place] [facility] 11/26/2018   Consent Y   Ordering Physician Juan   Interpreting Physician Juan   Testing Technician Anita GALDAMEZ RN   Location Back   Time start: 10:08 AM   Time End: 10:23 AM   Positive Control: Histatrol*ALK 1 mg/ml 4/22   Negative Control: 50% Glycerin 0/0   Cat Hair*ALK (10,000 BAU/ml) 0/0   AP Dog Hair/Dander (1:100 w/v) 5/24   Dust Mite p.  30,000 AU/ml 0/0   Dust Mite f. (30,000 AU/ml) 0/0   Shahbaz (W/F in millimeters) 0/0   Grass Mix #7 (100,000 BAU/ml) 9/40   Red Cedar (W/F in millimeters) 0/0   Maple/[place] (W/F in millimeters) 0/0   Hackberry (W/F in millimeters) 0/0   [place] (W/F in millimeters) 0/0   [place] *ALK (W/F in millimeters) 0/0   American Elm (W/F in millimeters) 0/0   [place] (W/F in millimeters) 0/0   Black [place] (W/F in millimeters) 0/0   Birch Mix (W/F in millimeters) 0/0   [place] (W/F in millimeters) 0/0   Oak (W/F in millimeters) 0/0   [place] (W/F in millimeters) 0/0   [place] (W/F in millimeters) 0/0   White Elia (W/F in millimeters) 0/0   Careless (W/F in millimeters) 0/0   Nettle (W/F in millimeters) 0/0   English Plantain (W/F in millimeters) 0/0   Kochia (W/F in millimeters) 0/0   Lamb's Quarter (W/F in millimeters) 0/0   Marshelder (W/F in millimeters) 0/0   Ragweed Mix* ALK (W/F in millimeters) 0/0   Russian Thistle (W/F in millimeters) 0/0   Sagebrush/Mugwort (W/F in millimeters) 0/0   Sheep Sorrel (W/F in millimeters) 0/0   Feather Mix* ALK (W/F in millimeters) 0/0   Penicillium Mix (1:10 w/v) 0/0   Curvularia spicifera (1:10 w/v) 0/0   Epicoccum (1:10 w/v) 0/0   Aspergillus fumigatus (1:10 w/v): 0/0   Alternaria tenius (1:10 w/v) 0/0   H. Cladosporium (1:10 w/v) 0/0   Phoma herbarum (1:10 w/v) 0/0        Percutaneous skin puncture testing for aeroallergens performed today (November 26, 2018) showed sensitivity to dog and pollen of grass mix #7.  She was the rest was negative with appropriate responses to positive and negative controls.    ASSESSMENT/PLAN:    No problems updated.  Problem List Items Addressed This Visit        Respiratory    Allergic rhinitis - Primary  Avoidance measures discussed and information provided.  -Continue flunisolide.  -Restart cetirizine 10 mg by mouth daily as needed.  -Use azelastine 2 sprays in each nostril twice daily as needed.   The patient is interested in allergen  immunotherapy; however, she is surprised to see that the skin test was negative for cats.  She states that she is symptomatic around the cats, and in the past, skin test was positive.  -I ordered serum IgE for cat and dust mite.  If negative, I will include that in allergen immunotherapy prescription.     The patient was counseled regarding the time commitment, auto injectable epinephrine device policy, risks, and benefits of  allergen immunotherapy and she wishes to proceed.    Relevant Medications    EPINEPHrine (AUVI-Q) 0.3 MG/0.3ML injection 2-pack    Other Relevant Orders    ALLERGY SKIN TESTS,ALLERGENS (Completed)      Other Visit Diagnoses     Allergic rhinitis due to animals        Relevant Medications    EPINEPHrine (AUVI-Q) 0.3 MG/0.3ML injection 2-pack    Other Relevant Orders    ALLERGY SKIN TESTS,ALLERGENS (Completed)    Allergen cat epithellium IgE    Allergen D farinae IgE    Allergen D pteronyssinus IgE            Return in about 3 months (around 2/26/2019), or if symptoms worsen or fail to improve.    Thank you for allowing us to participate in the care of this patient. Please feel free to contact us if there are any questions or concerns about the patient.    Disclaimer: This note consists of symbols derived from keyboarding, dictation and/or voice recognition software. As a result, there may be errors in the script that have gone undetected. Please consider this when interpreting information found in this chart.    Shun Martinez MD, [facility]  Allergy, Asthma and Immunology  [place], [place] and [place]      Again, thank you for allowing me to participate in the care of your patient.        Sincerely,        Shun Martinez MD

## 2018-11-26 NOTE — NURSING NOTE
Writer demonstrated how to use an Auvi-Q Epinephrine auto-injector.  Patient instructed to remove cap from device and follow the instructions given by the recorded audio. This includes removing the red safety release by pulling straight out, then firmly pushing the black tip against outer thigh until it clicks, hold for 5 seconds.  Patient advised that once used, needle will not be exposed, as it retracts back into the device. Patient was able to practice with the training device and demonstrated correct technique. Patient advised to call 911 or go to emergency department after Auvi-Q use for further monitoring.       RN reviewed Anaphylaxis Action Plan with patient. Educated on the symptoms and treatment of anaphylaxis. Went through the different ways that a reaction can present, and the body systems that it can affect. Patient verbalized understanding.     Aeroallergen avoidance measures were discussed with the patient and literature was provided.     Reviewed immunotherapy packet with patient. Patient states understanding. Has no further questions. Patient signed the consent form for immunotherapy and was told that the Allergy Lab would contact patient once the serums arrive.    Anita GALDAMEZ   Allergy [facility]

## 2018-11-26 NOTE — MR AVS SNAPSHOT
After Visit Summary   2018    Teresa Salinas    MRN: [id number]           Patient Information     Date Of Birth          1985        Visit Information        Provider Department      2018 10:00 AM Shun Martinez MD [facility]        Today's Diagnoses     Seasonal allergic rhinitis due to pollen    -  1    Allergic rhinitis due to animals          Care Instructions    Anaphylaxis Action Plan for Immunotherapy Patients    There is risk of systemic reactions when receiving immunotherapy injections. Local reactions such as a wheal (hive) smaller than a quarter, redness, swelling, and soreness are common. If the wheal is greater than the size of a half dollar (3.4 cm) please contact our clinic (numbers below), as we will need to adjust the dose that you receive at your next injection. Waiting until the next appointment to inform us of the reaction could increase the wait time that you experience, because your allergist will need to be contacted for new orders prior to giving the injection.  If you have symptoms not localized to the injection site, please follow the anaphylaxis plan, and contact our office to update after you have received emergency medical treatment. Please ask to speak to an Allergy RN with any questions or updates.     [facility]: [phone]  [facility]: [phone]  [facility]: [phone]  [place]: [phone]  [phone]          AEROALLERGEN AVOIDANCE INSTRUCTIONS    POLLEN  Visit [email or web address]  Pollens are the tiny airborne particles given off by trees, weeds, and grasses. They can be the cause of seasonal allergic rhinitis or hay fever symptoms, which include stuffy, itchy, runny nose, redness, swelling and itching of the eyes, and itching of the ears and throat. Here are some tips on how to avoid pollen exposure.  1. Keep windows closed and use the air conditioner when possible.  2.  Avoid outside exposure in the  early morning as pollen counts are highest at that time.  3.  Take a shower and wash hair each night.  4.  Consider wearing a mask when working in the yard and/or garden.  5.  Clean furnace filter monthly with HEPA filters. Consider a HEPA filter vacuum  which will prevent pollen from being reintroduced into the air.   1.   PETS  Pets present many problems for people with allergies. Dander from pets is very difficult to remove and also is a food source for dust mites.  1.  If possible, find the pet a new home.  2.  If not possible, keep the pet outdoors. Never allow the pet into the bedroom.  3.  Wash pet weekly in warm water.  4.  Encase mattresses, pillows, and box springs in allergen-proof covers.  5.  Use HEPA air filters and a HEPA filter vacuum . Change filters monthly.            Follow-ups after your visit        Future tests that were ordered for you today     Open Future Orders        Priority Expected Expires Ordered    Allergen cat epithellium IgE Routine  11/26/2019 11/26/2018    Allergen D farinae IgE Routine  11/26/2019 11/26/2018    Allergen D pteronyssinus IgE Routine  11/26/2019 11/26/2018            Who to contact     If you have questions or need follow up information about today's clinic visit or your schedule please contact [facility] directly at [phone].  Normal or non-critical lab and imaging results will be communicated to you by Beakerhart, letter or phone within 4 business days after the clinic has received the results. If you do not hear from us within 7 days, please contact the clinic through Beakerhart or phone. If you have a critical or abnormal lab result, we will notify you by phone as soon as possible.  Submit refill requests through [employer] or call your pharmacy and they will forward the refill request to us. Please allow 3 business days for your refill to be completed.          Additional Information About Your Visit        [employer] Information      [employer] gives you secure access to your electronic health record. If you see a primary care provider, you can also send messages to your care team and make appointments. If you have questions, please call your primary care clinic.  If you do not have a primary care provider, please call [phone] and they will assist you.        Care EveryWhere ID     This is your Care EveryWhere ID. This could be used by other organizations to access your [place] medical records  [id number]        Your Vitals Were     Pulse Temperature Respirations Pulse Oximetry          81 98  F (36.7  C) (Tympanic) 16 97%         Blood Pressure from Last 3 Encounters:   11/26/18 119/79   11/13/18 124/70   06/29/18 111/78    Weight from Last 3 Encounters:   11/13/18 77.3 kg (170 lb 6.7 oz)   06/29/18 77.1 kg (170 lb)   07/05/17 79.7 kg (175 lb 9.6 oz)              We Performed the Following     ALLERGY SKIN TESTS,ALLERGENS          Today's Medication Changes          These changes are accurate as of 11/26/18 11:04 AM.  If you have any questions, ask your nurse or doctor.               Start taking these medicines.        Dose/Directions    EPINEPHrine 0.3 MG/0.3ML injection 2-pack   Commonly known as:  AUVI-Q   Used for:  Seasonal allergic rhinitis due to pollen, Allergic rhinitis due to animals   Started by:  Shun Martinez MD        Dose:  0.3 mg   Inject 0.3 mLs (0.3 mg) into the muscle as needed for anaphylaxis   Quantity:  0.6 mL   Refills:  2            Where to get your medicines      These medications were sent to [facility], [address] [place] [id number]     Phone:  [phone]     EPINEPHrine 0.3 MG/0.3ML injection 2-pack                Primary Care Provider Office Phone # Fax #    Kacie Vazquez [phone] [address] [facility] [address]        Equal Access to Services     CAROLANN MCCURDY AH: dipak Cordon  preethi franisabella mckenziegladys sherwood ah. So [facility] [phone].    ATENCIÓN: Si branden winter, tiene a dorsey disposición servicios gratuitos de asistencia lingüística. Ric al [phone].    We comply with applicable federal civil rights laws and [place] laws. We do not discriminate on the basis of race, color, national origin, age, disability, sex, sexual orientation, or gender identity.            Thank you!     Thank you for choosing [facility]  for your care. Our goal is always to provide you with excellent care. Hearing back from our patients is one way we can continue to improve our services. Please take a few minutes to complete the written survey that you may receive in the mail after your visit with us. Thank you!             Your Updated Medication List - Protect others around you: Learn how to safely use, store and throw away your medicines at www.disposemymeds.org.          This list is accurate as of 11/26/18 11:04 AM.  Always use your most recent med list.                   Brand Name Dispense Instructions for use Diagnosis    albuterol 108 (90 Base) MCG/ACT inhaler    PROAIR HFA/PROVENTIL HFA/VENTOLIN HFA    1 Inhaler    Inhale 2 puffs into the lungs every 4 hours as needed for shortness of breath / dyspnea or wheezing    Other allergic rhinitis       azelastine 0.05 % ophthalmic solution    OPTIVAR    6 mL    Place 1 drop into both eyes 2 times daily    Allergic conjunctivitis, bilateral       azelastine 0.1 % nasal spray    ASTELIN    30 mL    Spray 2 sprays into both nostrils 2 times daily as needed for rhinitis or allergies    Chronic rhinitis       BIOTIN 5000 5 MG Caps   Generic drug:  biotin     30 capsule         cetirizine 10 MG tablet    zyrTEC    30 tablet    Take 1 tablet (10 mg) by mouth every evening        EPINEPHrine 0.3 MG/0.3ML injection 2-pack    AUVI-Q    0.6 mL    Inject 0.3 mLs (0.3 mg) into the muscle as needed for  anaphylaxis    Seasonal allergic rhinitis due to pollen, Allergic rhinitis due to animals       flunisolide 25 MCG/ACT (0.025%) Soln spray    NASALIDE    1 Bottle    Spray 2 sprays into both nostrils every 12 hours    Allergic conjunctivitis, bilateral       IRON SUPPLEMENT PO           Multi-vitamin Tabs tablet     100 tablet    Take 1 tablet by mouth daily        vitamin D3 2000 units tablet    CHOLECALCIFEROL    100 tablet    Take 2,000 Units by mouth daily

## 2018-11-26 NOTE — PATIENT INSTRUCTIONS
Anaphylaxis Action Plan for Immunotherapy Patients    There is risk of systemic reactions when receiving immunotherapy injections. Local reactions such as a wheal (hive) smaller than a quarter, redness, swelling, and soreness are common. If the wheal is greater than the size of a half dollar (3.4 cm) please contact our clinic (numbers below), as we will need to adjust the dose that you receive at your next injection. Waiting until the next appointment to inform us of the reaction could increase the wait time that you experience, because your allergist will need to be contacted for new orders prior to giving the injection.  If you have symptoms not localized to the injection site, please follow the anaphylaxis plan, and contact our office to update after you have received emergency medical treatment. Please ask to speak to an Allergy RN with any questions or updates.     [facility]: [phone]  [facility]: [phone]  [facility]: [phone]  [place]: [phone]  [phone]          AEROALLERGEN AVOIDANCE INSTRUCTIONS    POLLEN  Visit [email or web address]  Pollens are the tiny airborne particles given off by trees, weeds, and grasses. They can be the cause of seasonal allergic rhinitis or hay fever symptoms, which include stuffy, itchy, runny nose, redness, swelling and itching of the eyes, and itching of the ears and throat. Here are some tips on how to avoid pollen exposure.  1. Keep windows closed and use the air conditioner when possible.  2.  Avoid outside exposure in the early morning as pollen counts are highest at that time.  3.  Take a shower and wash hair each night.  4.  Consider wearing a mask when working in the yard and/or garden.  5.  Clean furnace filter monthly with HEPA filters. Consider a HEPA filter vacuum  which will prevent pollen from being reintroduced into the air.   1.   PETS  Pets present many problems for people with allergies. Dander from pets is very  difficult to remove and also is a food source for dust mites.  1.  If possible, find the pet a new home.  2.  If not possible, keep the pet outdoors. Never allow the pet into the bedroom.  3.  Wash pet weekly in warm water.  4.  Encase mattresses, pillows, and box springs in allergen-proof covers.  5.  Use HEPA air filters and a HEPA filter vacuum . Change filters monthly.

## 2018-11-27 LAB
CAT DANDER IGG QN: <0.1 KU(A)/L
D FARINAE IGE QN: <0.1 KU(A)/L
D PTERONYSS IGE QN: <0.1 KU(A)/L

## 2018-11-29 DIAGNOSIS — J30.89 ALLERGIC RHINITIS DUE TO DUST MITE: Primary | ICD-10-CM

## 2018-11-29 DIAGNOSIS — J30.1 SEASONAL ALLERGIC RHINITIS DUE TO POLLEN: ICD-10-CM

## 2018-11-29 NOTE — PROGRESS NOTES
ALLERGY SOLUTION NEW REQUEST    Teresa Salinas 1985 MRN: [id number]    DATE NEEDED:  Routine  Vial Color                                          Content                 Top Dose               Vial Size  Green 1:1,000, Blue 1:100, Yellow 1:10 and Red 1:1 Dog, Grass                            Red 1:1  0.5 mL              5mL        Shot Clinic Location:  [place]  Ship to Location: [place]  Special Instructions:  New Allergy Patient--please call the patient when serum(s) arrive(s)        Requester Signature  Shun Martinez MD

## 2018-12-12 ENCOUNTER — TELEPHONE (OUTPATIENT)
Dept: ALLERGY | Facility: CLINIC | Age: 33
End: 2018-12-12

## 2018-12-12 DIAGNOSIS — J30.2 SEASONAL ALLERGIC RHINITIS: Primary | ICD-10-CM

## 2018-12-12 PROCEDURE — 95165 ANTIGEN THERAPY SERVICES: CPT | Performed by: ALLERGY & IMMUNOLOGY

## 2018-12-12 NOTE — PROGRESS NOTES
Allergy serums received at [place].     Vials received below:    Vial Color Content                         Vial Size Expiration Date  Green 1:1,000, Blue 1:100, Yellow 1:10 and Red 1:1 Dog, Grass 5mL each Green 6-10-19 and Blue,Yellow, Red 12-10-19      Signature  Juan Aceves

## 2018-12-12 NOTE — PROGRESS NOTES
Allergy serums billed at [place].     Vials received below:    Vial Color Content                         Vial Size Expiration Date  Green 1:1,000, Blue 1:100, Yellow 1:10 and Red 1:1 Dog, Grass 5mL each Green 6-10-19 and Blue,Yellow, Red 12-10-19        Original Refill encounter date: 11/29/18      Signature  Juan Aceves

## 2018-12-13 ENCOUNTER — ALLIED HEALTH/NURSE VISIT (OUTPATIENT)
Dept: ALLERGY | Facility: CLINIC | Age: 33
End: 2018-12-13
Payer: COMMERCIAL

## 2018-12-13 DIAGNOSIS — J30.9 ALLERGIC RHINITIS: Primary | ICD-10-CM

## 2018-12-13 PROCEDURE — 99207 ZZC DROP WITH A PROCEDURE: CPT

## 2018-12-13 PROCEDURE — 95115 IMMUNOTHERAPY ONE INJECTION: CPT

## 2018-12-13 NOTE — TELEPHONE ENCOUNTER
Patient called back.  Writer informed her that her serums had arrived and that she could now schedule her first allergy injection.  Writer transferred patient to scheduling.

## 2018-12-13 NOTE — PROGRESS NOTES
Patient presented after waiting 30 minutes with no reaction to  injections. Discharged from clinic.  Junior Freeman RN

## 2018-12-17 ENCOUNTER — ALLIED HEALTH/NURSE VISIT (OUTPATIENT)
Dept: ALLERGY | Facility: CLINIC | Age: 33
End: 2018-12-17
Payer: COMMERCIAL

## 2018-12-17 DIAGNOSIS — J30.9 ALLERGIC RHINITIS: Primary | ICD-10-CM

## 2018-12-17 PROCEDURE — 99207 ZZC DROP WITH A PROCEDURE: CPT

## 2018-12-17 PROCEDURE — 95115 IMMUNOTHERAPY ONE INJECTION: CPT

## 2018-12-20 ENCOUNTER — ALLIED HEALTH/NURSE VISIT (OUTPATIENT)
Dept: ALLERGY | Facility: CLINIC | Age: 33
End: 2018-12-20
Payer: COMMERCIAL

## 2018-12-20 DIAGNOSIS — J30.9 ALLERGIC RHINITIS: Primary | ICD-10-CM

## 2018-12-20 PROCEDURE — 95115 IMMUNOTHERAPY ONE INJECTION: CPT

## 2018-12-20 PROCEDURE — 99207 ZZC DROP WITH A PROCEDURE: CPT

## 2018-12-20 NOTE — PROGRESS NOTES
Patient presented after waiting 30 minutes with no reaction to  injections. Discharged from clinic.    Anita GALDAMEZ   Allergy RAMAKRISHNA

## 2018-12-24 ENCOUNTER — ALLIED HEALTH/NURSE VISIT (OUTPATIENT)
Dept: ALLERGY | Facility: CLINIC | Age: 33
End: 2018-12-24
Payer: COMMERCIAL

## 2018-12-24 DIAGNOSIS — J30.9 ALLERGIC RHINITIS: Primary | ICD-10-CM

## 2018-12-24 PROCEDURE — 99207 ZZC DROP WITH A PROCEDURE: CPT

## 2018-12-24 PROCEDURE — 95115 IMMUNOTHERAPY ONE INJECTION: CPT

## 2018-12-27 ENCOUNTER — ALLIED HEALTH/NURSE VISIT (OUTPATIENT)
Dept: ALLERGY | Facility: CLINIC | Age: 33
End: 2018-12-27
Payer: COMMERCIAL

## 2018-12-27 DIAGNOSIS — J30.9 ALLERGIC RHINITIS: Primary | ICD-10-CM

## 2018-12-27 PROCEDURE — 99207 ZZC DROP WITH A PROCEDURE: CPT

## 2018-12-27 PROCEDURE — 95115 IMMUNOTHERAPY ONE INJECTION: CPT

## 2018-12-31 ENCOUNTER — ALLIED HEALTH/NURSE VISIT (OUTPATIENT)
Dept: ALLERGY | Facility: CLINIC | Age: 33
End: 2018-12-31
Payer: COMMERCIAL

## 2018-12-31 DIAGNOSIS — J30.9 ALLERGIC RHINITIS: Primary | ICD-10-CM

## 2018-12-31 PROCEDURE — 95115 IMMUNOTHERAPY ONE INJECTION: CPT

## 2019-01-03 ENCOUNTER — ALLIED HEALTH/NURSE VISIT (OUTPATIENT)
Dept: ALLERGY | Facility: CLINIC | Age: 34
End: 2019-01-03
Payer: COMMERCIAL

## 2019-01-03 DIAGNOSIS — J30.9 ALLERGIC RHINITIS: Primary | ICD-10-CM

## 2019-01-03 PROCEDURE — 99207 ZZC DROP WITH A PROCEDURE: CPT

## 2019-01-03 PROCEDURE — 95115 IMMUNOTHERAPY ONE INJECTION: CPT

## 2019-01-07 ENCOUNTER — ALLIED HEALTH/NURSE VISIT (OUTPATIENT)
Dept: ALLERGY | Facility: CLINIC | Age: 34
End: 2019-01-07
Payer: COMMERCIAL

## 2019-01-07 DIAGNOSIS — J30.9 ALLERGIC RHINITIS: Primary | ICD-10-CM

## 2019-01-07 PROCEDURE — 99207 ZZC DROP WITH A PROCEDURE: CPT

## 2019-01-07 PROCEDURE — 95115 IMMUNOTHERAPY ONE INJECTION: CPT

## 2019-01-10 ENCOUNTER — ALLIED HEALTH/NURSE VISIT (OUTPATIENT)
Dept: ALLERGY | Facility: CLINIC | Age: 34
End: 2019-01-10
Payer: COMMERCIAL

## 2019-01-10 DIAGNOSIS — J30.9 ALLERGIC RHINITIS: Primary | ICD-10-CM

## 2019-01-10 PROCEDURE — 95115 IMMUNOTHERAPY ONE INJECTION: CPT

## 2019-01-10 PROCEDURE — 99207 ZZC DROP WITH A PROCEDURE: CPT

## 2019-01-10 NOTE — PROGRESS NOTES
Patient presented after waiting 30 minutes with no reaction to  injections. Discharged from clinic.    Gloria Zaldivar RN

## 2019-01-16 ENCOUNTER — PRENATAL OFFICE VISIT (OUTPATIENT)
Dept: OBGYN | Facility: CLINIC | Age: 34
End: 2019-01-16

## 2019-01-16 ENCOUNTER — APPOINTMENT (OUTPATIENT)
Dept: OBGYN | Facility: CLINIC | Age: 34
End: 2019-01-16
Payer: COMMERCIAL

## 2019-01-16 DIAGNOSIS — Z34.00 PRENATAL CARE, FIRST PREGNANCY: Primary | ICD-10-CM

## 2019-01-16 PROCEDURE — 99207 ZZC NO CHARGE NURSE ONLY: CPT | Performed by: OBSTETRICS & GYNECOLOGY

## 2019-01-16 RX ORDER — PRENATAL VIT/IRON FUM/FOLIC AC 27MG-0.8MG
1 TABLET ORAL DAILY
COMMUNITY
Start: 2021-11-16 | End: 2024-02-07

## 2019-02-08 ENCOUNTER — PRENATAL OFFICE VISIT (OUTPATIENT)
Dept: OBGYN | Facility: CLINIC | Age: 34
End: 2019-02-08
Payer: COMMERCIAL

## 2019-02-08 VITALS
DIASTOLIC BLOOD PRESSURE: 69 MMHG | SYSTOLIC BLOOD PRESSURE: 111 MMHG | TEMPERATURE: 98.5 F | RESPIRATION RATE: 16 BRPM | HEIGHT: 65 IN | HEART RATE: 79 BPM | BODY MASS INDEX: 28.39 KG/M2 | WEIGHT: 170.4 LBS

## 2019-02-08 DIAGNOSIS — Z34.81 PRENATAL CARE, SUBSEQUENT PREGNANCY IN FIRST TRIMESTER: Primary | ICD-10-CM

## 2019-02-08 LAB
ABO + RH BLD: NORMAL
ABO + RH BLD: NORMAL
ALBUMIN UR-MCNC: NEGATIVE MG/DL
APPEARANCE UR: ABNORMAL
BACTERIA #/AREA URNS HPF: ABNORMAL /HPF
BILIRUB UR QL STRIP: NEGATIVE
BLD GP AB SCN SERPL QL: NORMAL
BLOOD BANK CMNT PATIENT-IMP: NORMAL
CAOX CRY #/AREA URNS HPF: ABNORMAL /HPF
COLOR UR AUTO: YELLOW
ERYTHROCYTE [DISTWIDTH] IN BLOOD BY AUTOMATED COUNT: 12.6 % (ref 10–15)
GLUCOSE UR STRIP-MCNC: NEGATIVE MG/DL
HCT VFR BLD AUTO: 40.9 % (ref 35–47)
HGB BLD-MCNC: 14.1 G/DL (ref 11.7–15.7)
HGB UR QL STRIP: ABNORMAL
KETONES UR STRIP-MCNC: NEGATIVE MG/DL
LEUKOCYTE ESTERASE UR QL STRIP: ABNORMAL
MCH RBC QN AUTO: 29.7 PG (ref 26.5–33)
MCHC RBC AUTO-ENTMCNC: 34.5 G/DL (ref 31.5–36.5)
MCV RBC AUTO: 86 FL (ref 78–100)
NITRATE UR QL: NEGATIVE
PH UR STRIP: 5 PH (ref 5–7)
PLATELET # BLD AUTO: 233 10E9/L (ref 150–450)
RBC # BLD AUTO: 4.75 10E12/L (ref 3.8–5.2)
RBC #/AREA URNS AUTO: ABNORMAL /HPF
SOURCE: ABNORMAL
SP GR UR STRIP: >1.03 (ref 1–1.03)
SPECIMEN EXP DATE BLD: NORMAL
UROBILINOGEN UR STRIP-ACNC: 0.2 EU/DL (ref 0.2–1)
WBC # BLD AUTO: 8.8 10E9/L (ref 4–11)
WBC #/AREA URNS AUTO: ABNORMAL /HPF

## 2019-02-08 PROCEDURE — 36415 COLL VENOUS BLD VENIPUNCTURE: CPT | Performed by: OBSTETRICS & GYNECOLOGY

## 2019-02-08 PROCEDURE — 87591 N.GONORRHOEAE DNA AMP PROB: CPT | Performed by: OBSTETRICS & GYNECOLOGY

## 2019-02-08 PROCEDURE — 81001 URINALYSIS AUTO W/SCOPE: CPT | Performed by: OBSTETRICS & GYNECOLOGY

## 2019-02-08 PROCEDURE — 0064U ANTB TP TOTAL&RPR IA QUAL: CPT | Performed by: OBSTETRICS & GYNECOLOGY

## 2019-02-08 PROCEDURE — 87340 HEPATITIS B SURFACE AG IA: CPT | Performed by: OBSTETRICS & GYNECOLOGY

## 2019-02-08 PROCEDURE — 87086 URINE CULTURE/COLONY COUNT: CPT | Performed by: OBSTETRICS & GYNECOLOGY

## 2019-02-08 PROCEDURE — 87491 CHLMYD TRACH DNA AMP PROBE: CPT | Performed by: OBSTETRICS & GYNECOLOGY

## 2019-02-08 PROCEDURE — 85027 COMPLETE CBC AUTOMATED: CPT | Performed by: OBSTETRICS & GYNECOLOGY

## 2019-02-08 PROCEDURE — 87088 URINE BACTERIA CULTURE: CPT | Performed by: OBSTETRICS & GYNECOLOGY

## 2019-02-08 PROCEDURE — 86850 RBC ANTIBODY SCREEN: CPT | Performed by: OBSTETRICS & GYNECOLOGY

## 2019-02-08 PROCEDURE — 87389 HIV-1 AG W/HIV-1&-2 AB AG IA: CPT | Performed by: OBSTETRICS & GYNECOLOGY

## 2019-02-08 PROCEDURE — 99207 ZZC FIRST OB VISIT: CPT | Performed by: OBSTETRICS & GYNECOLOGY

## 2019-02-08 PROCEDURE — 86900 BLOOD TYPING SEROLOGIC ABO: CPT | Performed by: OBSTETRICS & GYNECOLOGY

## 2019-02-08 PROCEDURE — 86762 RUBELLA ANTIBODY: CPT | Performed by: OBSTETRICS & GYNECOLOGY

## 2019-02-08 PROCEDURE — 86901 BLOOD TYPING SEROLOGIC RH(D): CPT | Performed by: OBSTETRICS & GYNECOLOGY

## 2019-02-08 ASSESSMENT — MIFFLIN-ST. JEOR: SCORE: 1488.81

## 2019-02-08 NOTE — PROGRESS NOTES
[facility]   OB/GYN Clinic    CC: New OB     Subjective:    Teresa is a 33 year old  at 8w4d by Patient's last menstrual period was 12/10/2018. who presents for her initial OB visit. This is a planned pregnancy and her and her partner are excited. She reports feeling well. Denies any abdominal pain or vaginal bleeding. Denies nausea and vomiting.   Prior to a +UPT, she reports regular monthly periods without contraception use.     Obstetric History       T0      L0     SAB0   TAB0   Ectopic0   Multiple0   Live Births0       # Outcome Date GA Lbr Krishna/2nd Weight Sex Delivery Anes PTL Lv   1 Current                   Past Medical History:   Diagnosis Date     Allergic rhinitis 3/11/2014     Asthma 3/11/2014     Chickenpox      Depression      Family history of thyroid disease 2015       Past Surgical History:   Procedure Laterality Date     Arthoscopic right ankle Right      OSTEOTOMY ANKLE Right        Current Outpatient Medications   Medication Sig Dispense Refill     cetirizine (ZYRTEC) 10 MG tablet Take 1 tablet (10 mg) by mouth every evening 30 tablet 1     Cholecalciferol (VITAMIN D) 2000 UNITS tablet Take 2,000 Units by mouth daily 100 tablet 3     cromolyn (OPTICROM) 4 % ophthalmic solution Place 1 drop into both eyes 4 times daily as needed (Itchy/watery eyes) 10 mL 3     Ferrous Sulfate (IRON SUPPLEMENT PO)        ORDER FOR ALLERGEN IMMUNOTHERAPY Name of Mix: Mix #1  Dog, Grass  Dog Hair-Dander, A. P.  1:100 w/v, HS  1.0 ml   Vasquez (Std) 100,000 BAU/mL, HS 0.3 ml  Diluent: HSA qs to 5ml 5 mL PRN     Prenatal Vit-Fe Fumarate-FA (PRENATAL MULTIVITAMIN W/IRON) 27-0.8 MG tablet Take 1 tablet by mouth daily       albuterol (PROAIR HFA/PROVENTIL HFA/VENTOLIN HFA) 108 (90 Base) MCG/ACT Inhaler Inhale 2 puffs into the lungs every 4 hours as needed for shortness of breath / dyspnea or wheezing (Patient not taking: Reported on 2019) 1 Inhaler 6     EPINEPHrine  "(AUVI-Q) 0.3 MG/0.3ML injection 2-pack Inject 0.3 mLs (0.3 mg) into the muscle as needed for anaphylaxis (Patient not taking: Reported on 2/8/2019) 0.6 mL 2       Family History   Problem Relation Age of Onset     Diabetes Father         typeII     Hypertension Father      Hyperlipidemia Father      Depression Father      Seasonal/Environmental Allergies Father      Hyperlipidemia Maternal Grandmother      Hyperlipidemia Mother      Thyroid Disease Mother      Seasonal/Environmental Allergies Mother      Colon Cancer Maternal Grandfather      Asthma Sister      Seasonal/Environmental Allergies Sister      Other - See Comments Sister         clotting disorder     Thyroid Disease Sister      Chronic Obstructive Pulmonary Disease Paternal Grandmother      Esophageal Cancer Paternal Grandfather        Social History     Tobacco Use     Smoking status: Never Smoker     Smokeless tobacco: Never Used   Substance Use Topics     Alcohol use: No     Alcohol/week: 0.0 oz       ROS: A 10 pt ROS was completed and found to be negative unless mentioned in the HPI.     Objective:  /69 (BP Location: Right arm, Patient Position: Sitting, Cuff Size: Adult Regular)   Pulse 79   Temp 98.5  F (36.9  C) (Tympanic)   Resp 16   Ht 1.651 m (5' 5\")   Wt 77.3 kg (170 lb 6.4 oz)   LMP 12/10/2018   Breastfeeding? No   BMI 28.36 kg/m      Estimated body mass index is 28.36 kg/m  as calculated from the following:    Height as of this encounter: 1.651 m (5' 5\").    Weight as of this encounter: 77.3 kg (170 lb 6.4 oz).    Physical Exam:  Gen: Pleasant, talkative female in no apparent distress   Endocrine: Thyroid without enlargement or nodularity   Lymph: no appreciable cervical lymphadenopathy  Respiratory: Lungs clear, breathing comfortably on room air   Cardiac: Regular rate and rhythm with no murmurs, gallops or rubs. Warm and well-perfused.   GI: Abd soft and non-tender  : External genitalia is free of lesion. Urethra and " bartholin glands normal.  Vaginal mucosa is moist and pink without unusual discharge.  Cervix is without lesions or discharge. Bimanual exam reveals mobile anteverted uterus without cervical motion tenderness.  Adenexa are mobile and non-tender bilaterally. No appreciable adnexal enlargement. Uterus is consistent with a 8-9 week gestation.   MSK: Grossly normal movement of all four extremities  Psych: mood and affect bright   Lower extremity: edema not present     Transvaginal US: Shultz IUP at 8w3d, +cardiac activity at 178 bpm, normal adnexa          Assessment/Plan:   33 year old  at 8w4d by LMP of Patient's last menstrual period was 12/10/2018. c/w 8w3d US who presents for her initial OB visit.   1) Plan to draw new OB lab today (T&S, CBC, HIV, RPR, HepBsAg, Hep B antibody, rubella, GC/Chlam, UC)  2) Discussed routine prenatal care, group practice model at [facility], tertiary support and frequency of visits. Options for  testing for chromosomal and birth defects were discussed with the patient including nuchal translucency/blood marker testing in the first trimester, progenity and quad screening and/or Level 2 ultrasound in the second trimester. We discussed that these are screening tests and not diagnostic tests and that false positives and negatives are a distinct possibility. We discussed that follow up diagnostic testing would include chorionic villus sampling or amniocentesis depending on gestational age. Written information provided. Patient declines genetic testing. Discussed risk of zika infection with travel and subsequent pregnancy risks. Referred to CDC for further information.   3) Plan for dating/viability scan now - confirmed GABRIELA 2019   4) Concerns: none  5) PMH/OBHx problems: none  6) Medication review: no changes, continue prenatal vitamin   7) Reviewed ectopic and miscarriage precautions (present to ED or call clinic with abdominal pain, vaginal bleeding or fever)   8)  Weight gain: discussed weight gain expectations (BMI 18.5-25: 25-35lbs)   9) PAP smear: 6/12/2015, NILM  10) Delivery plan: continue to discuss, breastfeeding, pp contraception, pain management in labor - continue to discuss  11) Immunizations: flu shot - already given at work , Tdap at 28wks  12) No increased risk for gestational diabetes for plan for screen at 28wks     Return to clinic in 4 weeks.     Uma Watkins MD  OB/GYN  2/8/2019

## 2019-02-08 NOTE — NURSING NOTE
"Initial /69 (BP Location: Right arm, Patient Position: Sitting, Cuff Size: Adult Regular)   Pulse 79   Temp 98.5  F (36.9  C) (Tympanic)   Resp 16   Ht 1.651 m (5' 5\")   Wt 77.3 kg (170 lb 6.4 oz)   LMP 12/10/2018   Breastfeeding? No   BMI 28.36 kg/m   Estimated body mass index is 28.36 kg/m  as calculated from the following:    Height as of this encounter: 1.651 m (5' 5\").    Weight as of this encounter: 77.3 kg (170 lb 6.4 oz). .    Haley Mariscal, [facility]    "

## 2019-02-10 LAB
BACTERIA SPEC CULT: ABNORMAL
C TRACH DNA SPEC QL NAA+PROBE: NEGATIVE
Lab: ABNORMAL
N GONORRHOEA DNA SPEC QL NAA+PROBE: NEGATIVE
SPECIMEN SOURCE: ABNORMAL
SPECIMEN SOURCE: NORMAL
SPECIMEN SOURCE: NORMAL

## 2019-02-11 DIAGNOSIS — R82.71 GBS BACTERIURIA: Primary | ICD-10-CM

## 2019-02-11 LAB
HBV SURFACE AG SERPL QL IA: NONREACTIVE
HIV 1+2 AB+HIV1 P24 AG SERPL QL IA: NONREACTIVE
RUBV IGG SERPL IA-ACNC: 23 IU/ML
T PALLIDUM AB SER QL: NONREACTIVE

## 2019-02-11 RX ORDER — NITROFURANTOIN 25; 75 MG/1; MG/1
100 CAPSULE ORAL 2 TIMES DAILY
Qty: 14 CAPSULE | Refills: 0 | Status: SHIPPED | OUTPATIENT
Start: 2019-02-11 | End: 2019-03-08

## 2019-02-24 ENCOUNTER — HOSPITAL ENCOUNTER (EMERGENCY)
Facility: CLINIC | Age: 34
Discharge: HOME | End: 2019-02-24
Attending: PHYSICIAN ASSISTANT | Admitting: PHYSICIAN ASSISTANT
Payer: COMMERCIAL

## 2019-02-24 VITALS
DIASTOLIC BLOOD PRESSURE: 76 MMHG | WEIGHT: 164 LBS | OXYGEN SATURATION: 98 % | BODY MASS INDEX: 27.29 KG/M2 | RESPIRATION RATE: 14 BRPM | SYSTOLIC BLOOD PRESSURE: 125 MMHG | TEMPERATURE: 98 F

## 2019-02-24 DIAGNOSIS — J06.9 VIRAL URI: ICD-10-CM

## 2019-02-24 LAB
INTERNAL QC OK POCT: YES
S PYO AG THROAT QL IA.RAPID: NEGATIVE

## 2019-02-24 PROCEDURE — 87880 STREP A ASSAY W/OPTIC: CPT | Performed by: PHYSICIAN ASSISTANT

## 2019-02-24 PROCEDURE — 99213 OFFICE O/P EST LOW 20 MIN: CPT | Mod: Z6 | Performed by: PHYSICIAN ASSISTANT

## 2019-02-24 PROCEDURE — G0463 HOSPITAL OUTPT CLINIC VISIT: HCPCS | Performed by: PHYSICIAN ASSISTANT

## 2019-02-24 PROCEDURE — 87081 CULTURE SCREEN ONLY: CPT | Performed by: PHYSICIAN ASSISTANT

## 2019-02-24 NOTE — ED PROVIDER NOTES
History     Chief Complaint   Patient presents with     Pharyngitis     sore throat since Thursday 11 wks gestation     HPI  Teresa Salinas is a 33 year old female who is currently 10 weeks 6 days pregnant who presents to the urgent care with concern over sore throat which been present for the last 4 days.  She additionally complains of nasal congestion, sinus pressure, postnasal drainage, minimal cough, headache, elevated temp up to 99 chills, myalgias, nausea and episodes of emesis however she states nausea and vomiting is difficult to differentiate vomiting due to her pregnancy.  She has attempted to treat with Tylenol, last dose was more than 12 hours prior to arrival.  She does have a history of asthma and has not felt the need to increase her albuterol use.    Allergies:  Allergies   Allergen Reactions     Contrast Dye Shortness Of Breath     Clindamycin Hives     Diagnostic X-Ray Materials Itching     Itching, sneezing.     Problem List:    Patient Active Problem List    Diagnosis Date Noted     GBS bacteriuria 02/11/2019     Priority: Medium     Rh negative state in antepartum period 02/08/2019     Priority: Medium     Allergic rhinitis due to pollen 11/29/2018     Priority: Medium     Percutaneous skin puncture testing for aeroallergens performed on November 26, 2018 showed sensitivity to dog and pollen of grass mix #7.       Mild intermittent asthma 06/12/2015     Priority: Medium     Family history of thyroid disease 06/12/2015     Priority: Medium     Allergic rhinitis due to dust mite 03/11/2014     Priority: Medium      Past Medical History:    Past Medical History:   Diagnosis Date     Allergic rhinitis 3/11/2014     Asthma 3/11/2014     Chickenpox      Depression      Family history of thyroid disease 6/12/2015       Past Surgical History:    Past Surgical History:   Procedure Laterality Date     Arthoscopic right ankle Right 1999     OSTEOTOMY ANKLE Right 2003     Family History:    Family  History   Problem Relation Age of Onset     Diabetes Father         typeII     Hypertension Father      Hyperlipidemia Father      Depression Father      Seasonal/Environmental Allergies Father      Hyperlipidemia Maternal Grandmother      Hyperlipidemia Mother      Thyroid Disease Mother      Seasonal/Environmental Allergies Mother      Colon Cancer Maternal Grandfather      Asthma Sister      Seasonal/Environmental Allergies Sister      Other - See Comments Sister         clotting disorder     Thyroid Disease Sister      Chronic Obstructive Pulmonary Disease Paternal Grandmother      Esophageal Cancer Paternal Grandfather        Social History:  Marital Status:   [2]  Social History     Tobacco Use     Smoking status: Never Smoker     Smokeless tobacco: Never Used   Substance Use Topics     Alcohol use: No     Alcohol/week: 0.0 oz     Drug use: No        Medications:      albuterol (PROAIR HFA/PROVENTIL HFA/VENTOLIN HFA) 108 (90 Base) MCG/ACT Inhaler   cetirizine (ZYRTEC) 10 MG tablet   Cholecalciferol (VITAMIN D) 2000 UNITS tablet   cromolyn (OPTICROM) 4 % ophthalmic solution   EPINEPHrine (AUVI-Q) 0.3 MG/0.3ML injection 2-pack   Ferrous Sulfate (IRON SUPPLEMENT PO)   ORDER FOR ALLERGEN IMMUNOTHERAPY   Prenatal Vit-Fe Fumarate-FA (PRENATAL MULTIVITAMIN W/IRON) 27-0.8 MG tablet     Review of Systems  CONSTITUTIONAL:POSITIVE  for subjective fever, chills, myalgias   INTEGUMENTARY/SKIN: NEGATIVE for worrisome rashes, moles or lesions  EYES: NEGATIVE for vision changes or irritation  ENT/MOUTH: POSITIVE for sore throat, nasal congestion, sinus pressure, postnasal drainage and NEGATIVE for ear pain   RESP:POSITIVE for cough and NEGATIVE for SOB/dyspnea and wheezing  GI: POSITIVE for abdominal pain, nausea and vomiting and NEGATIVE for diarrhea  Physical Exam   BP: 125/76  Heart Rate: 100  Temp: 98  F (36.7  C)  Resp: 14  Weight: 74.4 kg (164 lb)  SpO2: 98 %  Physical Exam  GENERAL APPEARANCE: healthy, alert and  no distress  EYES: EOMI,  PERRL, conjunctiva clear  HENT: ear canals and TM's normal.  Nasal mucosa moist.  Posterior pharynx is nonerythematous, nonedematous.  There is a single white lesion present on the junction of the right tonsil and soft palate  NECK: supple, nontender, no lymphadenopathy  RESP: lungs clear to auscultation - no rales, rhonchi or wheezes  CV: regular rates and rhythm, normal S1 S2, no murmur noted  ABDOMEN:  soft, minimal diffuse tenderness to palpation which resolves with distraction, no HSM or masses and bowel sounds normal, no rebound, guarding   SKIN: no suspicious lesions or rashes  ED Course        Procedures        Critical Care time:  none            Results for orders placed or performed during the hospital encounter of 02/24/19 (from the past 24 hour(s))   Rapid strep group A screen POCT   Result Value Ref Range    Rapid Strep A Screen negative neg    Internal QC OK Yes      Medications - No data to display    Assessments & Plan (with Medical Decision Making)     I have reviewed the nursing notes.    I have reviewed the findings, diagnosis, plan and need for follow up with the patient.          Medication List      ASK your doctor about these medications    nitroFURantoin macrocrystal-monohydrate 100 MG capsule  Commonly known as:  MACROBID  100 mg, Oral, 2 TIMES DAILY  Ask about: Should I take this medication?          Final diagnoses:   Viral URI     33-year-old female presents to urgent care with concern over sore throat which been present for last 4 days accompanied by nasal congestion, minimal cough, chills, myalgias.  She had stable vital signs upon arrival.  Physical exam findings as described above.  As part of evaluation she did have negative rapid strep test with culture pending.  No evidence of peritonsillar abscess/cellulitis.  Symptoms most consistent with viral illness.  She was discharged home stable with instructions for symptomatic treatment with rest, tylenol.   Follow up with PCP if no improvement in 5-7 days.  Worrisome reasons to return to ER/UC sooner discussed.     Disclaimer: This note consists of symbols derived from keyboarding, dictation, and/or voice recognition software. As a result, there may be errors in the script that have gone undetected.  Please consider this when interpreting information found in the chart.    2/24/2019   [facility] EMERGENCY DEPARTMENT     Yajaira Sr PA-C  02/24/19 1240

## 2019-02-26 LAB
BACTERIA SPEC CULT: NORMAL
Lab: NORMAL
SPECIMEN SOURCE: NORMAL

## 2019-02-26 NOTE — RESULT ENCOUNTER NOTE
Final Beta strep group A r/o culture is NEGATIVE for Group A streptococcus.    No treatment or change in treatment per [facility] Strep protocol.

## 2019-03-08 ENCOUNTER — PRENATAL OFFICE VISIT (OUTPATIENT)
Dept: OBGYN | Facility: CLINIC | Age: 34
End: 2019-03-08
Payer: COMMERCIAL

## 2019-03-08 VITALS
SYSTOLIC BLOOD PRESSURE: 111 MMHG | HEART RATE: 77 BPM | RESPIRATION RATE: 16 BRPM | BODY MASS INDEX: 28.56 KG/M2 | TEMPERATURE: 96.9 F | DIASTOLIC BLOOD PRESSURE: 61 MMHG | WEIGHT: 171.6 LBS

## 2019-03-08 DIAGNOSIS — R82.71 GBS BACTERIURIA: ICD-10-CM

## 2019-03-08 DIAGNOSIS — Z34.81 PRENATAL CARE, SUBSEQUENT PREGNANCY IN FIRST TRIMESTER: Primary | ICD-10-CM

## 2019-03-08 DIAGNOSIS — Z67.91 RH NEGATIVE STATE IN ANTEPARTUM PERIOD: ICD-10-CM

## 2019-03-08 DIAGNOSIS — O26.899 RH NEGATIVE STATE IN ANTEPARTUM PERIOD: ICD-10-CM

## 2019-03-08 LAB

## 2019-03-08 PROCEDURE — 87088 URINE BACTERIA CULTURE: CPT | Performed by: OBSTETRICS & GYNECOLOGY

## 2019-03-08 PROCEDURE — 81001 URINALYSIS AUTO W/SCOPE: CPT | Performed by: OBSTETRICS & GYNECOLOGY

## 2019-03-08 PROCEDURE — 99207 ZZC PRENATAL VISIT: CPT | Performed by: OBSTETRICS & GYNECOLOGY

## 2019-03-08 PROCEDURE — 87086 URINE CULTURE/COLONY COUNT: CPT | Performed by: OBSTETRICS & GYNECOLOGY

## 2019-03-08 NOTE — NURSING NOTE
"Initial /61   Pulse 77   Temp 96.9  F (36.1  C) (Tympanic)   Resp 16   Wt 77.8 kg (171 lb 9.6 oz)   LMP 12/10/2018   BMI 28.56 kg/m   Estimated body mass index is 28.56 kg/m  as calculated from the following:    Height as of 2/8/19: 1.651 m (5' 5\").    Weight as of this encounter: 77.8 kg (171 lb 9.6 oz). .      "

## 2019-03-08 NOTE — PROGRESS NOTES
[facility]   OB/GYN Clinic     CC: F/U OB      Subjective:     Teresa is a 33 year old  at 12w4d by Patient's last menstrual period was 12/10/2018. who presents for her f/u OB visit. She reports feeling well. Denies any abdominal pain or vaginal bleeding. Denies nausea and vomiting.     Objective:  VS: /61   Pulse 77   Temp 96.9  F (36.1  C) (Tympanic)   Resp 16   Wt 77.8 kg (171 lb 9.6 oz)   LMP 12/10/2018   BMI 28.56 kg/m       Physical Exam:  Gen: Pleasant, talkative female in no apparent distress   Respiratory: breathing comfortably on room air   Cardiac: Regular rate, warm and well-perfused.   GI: Abd soft and non-tender  MSK: Grossly normal movement of all four extremities  Psych: mood and affect bright   Lower extremity: edema not present     See OB flowsheet     Assessment/Plan:   33 year old  at 12w4d by LMP of Patient's last menstrual period was 12/10/2018. c/w 8w3d US who presents for her initial F/U visit.   1) OB labs: Rh NEG, miguel-, RI.   2) Patient declines genetic testing.   3) Plan for dating/viability scan now - confirmed GABRIELA 2019, Anatomy scan at 20wks  4) Concerns: none  5) PMH/OBHx problems: GBS+ urine at new OB, repeat UCx today, will need PCN at delivery   6) Medication review: no changes, continue prenatal vitamin   7) Reviewed ectopic and miscarriage precautions (present to ED or call clinic with abdominal pain, vaginal bleeding or fever)   8) Weight gain: discussed weight gain expectations (BMI 18.5-25: 25-35lbs)   9) PAP smear: 2015, NILM  10) Delivery plan: continue to discuss, breastfeeding, pp contraception, pain management in labor - continue to discuss  11) Immunizations: flu shot - already given at work , Tdap at 28wks  12) No increased risk for gestational diabetes for plan for screen at 28wks      Return to clinic in 4 weeks.      Uma Watkins MD  OB/GYN  3/8/2019

## 2019-03-10 LAB
BACTERIA SPEC CULT: ABNORMAL
BACTERIA SPEC CULT: ABNORMAL
Lab: ABNORMAL
SPECIMEN SOURCE: ABNORMAL

## 2019-03-11 DIAGNOSIS — R82.71 GBS BACTERIURIA: Primary | ICD-10-CM

## 2019-03-11 RX ORDER — CEPHALEXIN 500 MG/1
500 CAPSULE ORAL 2 TIMES DAILY
Qty: 14 CAPSULE | Refills: 0 | Status: SHIPPED | OUTPATIENT
Start: 2019-03-11 | End: 2019-04-30

## 2019-04-05 ENCOUNTER — PRENATAL OFFICE VISIT (OUTPATIENT)
Dept: OBGYN | Facility: CLINIC | Age: 34
End: 2019-04-05
Payer: COMMERCIAL

## 2019-04-05 VITALS
DIASTOLIC BLOOD PRESSURE: 60 MMHG | TEMPERATURE: 97.6 F | SYSTOLIC BLOOD PRESSURE: 119 MMHG | BODY MASS INDEX: 28.49 KG/M2 | WEIGHT: 171 LBS | HEIGHT: 65 IN | HEART RATE: 82 BPM | RESPIRATION RATE: 18 BRPM

## 2019-04-05 DIAGNOSIS — Z34.00 ENCOUNTER FOR SUPERVISION OF LOW-RISK FIRST PREGNANCY, ANTEPARTUM: ICD-10-CM

## 2019-04-05 DIAGNOSIS — Z34.92 PRENATAL CARE IN SECOND TRIMESTER: Primary | ICD-10-CM

## 2019-04-05 PROCEDURE — 99207 ZZC PRENATAL VISIT: CPT | Performed by: OBSTETRICS & GYNECOLOGY

## 2019-04-05 ASSESSMENT — MIFFLIN-ST. JEOR: SCORE: 1491.53

## 2019-04-05 NOTE — PROGRESS NOTES
"CC: Here for routine prenatal visit @ 16w4d   HPI:  Feeling well; reviewed importance of hydration, sun and bug protection; discussed GBS pos status    PE: /60 (BP Location: Right arm, Patient Position: Chair, Cuff Size: Adult Small)   Pulse 82   Temp 97.6  F (36.4  C) (Tympanic)   Resp 18   Ht 1.651 m (5' 5\")   Wt 77.6 kg (171 lb)   LMP 12/10/2018   BMI 28.46 kg/m     See OB flowsheet      A:  1. Prenatal care in second trimester    - US OB > 14 Weeks; Future    2. Encounter for supervision of low-risk first pregnancy, antepartum        Routine prenatal care  RTC 4 weeks.      Magnolia Herrera M.D.     "

## 2019-04-30 ENCOUNTER — PRENATAL OFFICE VISIT (OUTPATIENT)
Dept: OBGYN | Facility: CLINIC | Age: 34
End: 2019-04-30
Payer: COMMERCIAL

## 2019-04-30 ENCOUNTER — HOSPITAL ENCOUNTER (OUTPATIENT)
Dept: ULTRASOUND IMAGING | Facility: CLINIC | Age: 34
Discharge: HOME | End: 2019-04-30
Attending: OBSTETRICS & GYNECOLOGY | Admitting: OBSTETRICS & GYNECOLOGY
Payer: COMMERCIAL

## 2019-04-30 VITALS
TEMPERATURE: 97.4 F | WEIGHT: 174 LBS | BODY MASS INDEX: 28.96 KG/M2 | HEART RATE: 87 BPM | DIASTOLIC BLOOD PRESSURE: 74 MMHG | SYSTOLIC BLOOD PRESSURE: 115 MMHG

## 2019-04-30 DIAGNOSIS — Z34.92 PRENATAL CARE IN SECOND TRIMESTER: ICD-10-CM

## 2019-04-30 DIAGNOSIS — Z34.92 PRENATAL CARE IN SECOND TRIMESTER: Primary | ICD-10-CM

## 2019-04-30 DIAGNOSIS — R82.71 GBS BACTERIURIA: ICD-10-CM

## 2019-04-30 PROCEDURE — 76805 OB US >/= 14 WKS SNGL FETUS: CPT

## 2019-04-30 PROCEDURE — 87086 URINE CULTURE/COLONY COUNT: CPT | Performed by: OBSTETRICS & GYNECOLOGY

## 2019-04-30 PROCEDURE — 99207 ZZC PRENATAL VISIT: CPT | Performed by: OBSTETRICS & GYNECOLOGY

## 2019-04-30 NOTE — PROGRESS NOTES
Doing well.   Just returned from her fetal anatomy US  No complaints.   Denies VB, ctx, LOF. +FM  /74 (BP Location: Right arm, Patient Position: Chair, Cuff Size: Adult Regular)   Pulse 87   Temp 97.4  F (36.3  C) (Tympanic)   Wt 78.9 kg (174 lb)   LMP 12/10/2018   BMI 28.96 kg/m    General Appearance: NAD  Abdomen: Gravid, NT  Refer to flow sheet above.   A/P: 33 year old  at 20w1d  -- fetal anatomy US report pending  -- GBS bacteruria s/p treatment; repeat urine culture for test of cure  -- PTL precautions reviewed  RTC in 4 weeks    Vaughn Gonsalez MD  [facility]

## 2019-04-30 NOTE — NURSING NOTE
"Initial /74 (BP Location: Right arm, Patient Position: Chair, Cuff Size: Adult Regular)   Pulse 87   Temp 97.4  F (36.3  C) (Tympanic)   Wt 78.9 kg (174 lb)   LMP 12/10/2018   BMI 28.96 kg/m   Estimated body mass index is 28.96 kg/m  as calculated from the following:    Height as of 4/5/19: 1.651 m (5' 5\").    Weight as of this encounter: 78.9 kg (174 lb). .    Marilyn Nunez    "

## 2019-05-01 LAB
BACTERIA SPEC CULT: NORMAL
Lab: NORMAL
SPECIMEN SOURCE: NORMAL

## 2019-05-30 ENCOUNTER — TELEPHONE (OUTPATIENT)
Dept: OBGYN | Facility: CLINIC | Age: 34
End: 2019-05-30

## 2019-05-31 ENCOUNTER — PRENATAL OFFICE VISIT (OUTPATIENT)
Dept: OBGYN | Facility: CLINIC | Age: 34
End: 2019-05-31
Payer: COMMERCIAL

## 2019-05-31 VITALS
BODY MASS INDEX: 29.12 KG/M2 | HEART RATE: 86 BPM | DIASTOLIC BLOOD PRESSURE: 60 MMHG | SYSTOLIC BLOOD PRESSURE: 107 MMHG | TEMPERATURE: 97.3 F | WEIGHT: 175 LBS

## 2019-05-31 DIAGNOSIS — Z34.92 PRENATAL CARE IN SECOND TRIMESTER: Primary | ICD-10-CM

## 2019-05-31 DIAGNOSIS — Z34.92 PRENATAL CARE IN SECOND TRIMESTER: ICD-10-CM

## 2019-05-31 LAB
ERYTHROCYTE [DISTWIDTH] IN BLOOD BY AUTOMATED COUNT: 12.6 % (ref 10–15)
GLUCOSE 1H P 50 G GLC PO SERPL-MCNC: 107 MG/DL (ref 60–129)
HCT VFR BLD AUTO: 36.1 % (ref 35–47)
HGB BLD-MCNC: 12 G/DL (ref 11.7–15.7)
MCH RBC QN AUTO: 29.3 PG (ref 26.5–33)
MCHC RBC AUTO-ENTMCNC: 33.2 G/DL (ref 31.5–36.5)
MCV RBC AUTO: 88 FL (ref 78–100)
PLATELET # BLD AUTO: 205 10E9/L (ref 150–450)
RBC # BLD AUTO: 4.09 10E12/L (ref 3.8–5.2)
WBC # BLD AUTO: 10.1 10E9/L (ref 4–11)

## 2019-05-31 PROCEDURE — 99207 ZZC PRENATAL VISIT: CPT | Performed by: OBSTETRICS & GYNECOLOGY

## 2019-05-31 PROCEDURE — 82950 GLUCOSE TEST: CPT | Performed by: OBSTETRICS & GYNECOLOGY

## 2019-05-31 PROCEDURE — 85027 COMPLETE CBC AUTOMATED: CPT | Performed by: OBSTETRICS & GYNECOLOGY

## 2019-05-31 PROCEDURE — 86780 TREPONEMA PALLIDUM: CPT | Performed by: OBSTETRICS & GYNECOLOGY

## 2019-05-31 PROCEDURE — 36415 COLL VENOUS BLD VENIPUNCTURE: CPT | Performed by: OBSTETRICS & GYNECOLOGY

## 2019-05-31 NOTE — NURSING NOTE
"Initial /60 (BP Location: Right arm, Patient Position: Chair, Cuff Size: Adult Regular)   Pulse 86   Temp 97.3  F (36.3  C) (Tympanic)   Wt 79.4 kg (175 lb)   LMP 12/10/2018   Breastfeeding? No   BMI 29.12 kg/m   Estimated body mass index is 29.12 kg/m  as calculated from the following:    Height as of 4/5/19: 1.651 m (5' 5\").    Weight as of this encounter: 79.4 kg (175 lb). .    Marilyn Nunez    "

## 2019-06-01 LAB — T PALLIDUM AB SER QL: NONREACTIVE

## 2019-06-26 ENCOUNTER — PRENATAL OFFICE VISIT (OUTPATIENT)
Dept: OBGYN | Facility: CLINIC | Age: 34
End: 2019-06-26
Payer: COMMERCIAL

## 2019-06-26 VITALS
RESPIRATION RATE: 18 BRPM | HEIGHT: 65 IN | SYSTOLIC BLOOD PRESSURE: 105 MMHG | BODY MASS INDEX: 29.66 KG/M2 | HEART RATE: 91 BPM | DIASTOLIC BLOOD PRESSURE: 64 MMHG | TEMPERATURE: 97.8 F | WEIGHT: 178 LBS

## 2019-06-26 DIAGNOSIS — Z67.91 RH NEGATIVE STATE IN ANTEPARTUM PERIOD: ICD-10-CM

## 2019-06-26 DIAGNOSIS — Z34.03 ENCOUNTER FOR SUPERVISION OF LOW-RISK FIRST PREGNANCY IN THIRD TRIMESTER: Primary | ICD-10-CM

## 2019-06-26 DIAGNOSIS — Z23 NEED FOR TDAP VACCINATION: ICD-10-CM

## 2019-06-26 DIAGNOSIS — O26.899 RH NEGATIVE STATE IN ANTEPARTUM PERIOD: ICD-10-CM

## 2019-06-26 PROCEDURE — 90471 IMMUNIZATION ADMIN: CPT | Performed by: OBSTETRICS & GYNECOLOGY

## 2019-06-26 PROCEDURE — 96372 THER/PROPH/DIAG INJ SC/IM: CPT | Performed by: OBSTETRICS & GYNECOLOGY

## 2019-06-26 PROCEDURE — 99207 ZZC PRENATAL VISIT: CPT | Performed by: OBSTETRICS & GYNECOLOGY

## 2019-06-26 PROCEDURE — 90715 TDAP VACCINE 7 YRS/> IM: CPT | Performed by: OBSTETRICS & GYNECOLOGY

## 2019-06-26 ASSESSMENT — MIFFLIN-ST. JEOR: SCORE: 1523.28

## 2019-06-26 NOTE — PROGRESS NOTES
"CC: Here for routine prenatal visit @ 28w2d   HPI:  Feeling well; no complaints; considering CB classes; Encouraged patient to review contents of Prenatal Breastfeeding Education Toolkit. Offered opportunity to answer questions regarding the importance of skin to skin contact, early initiation of exclusive breastfeeding for the first six months and rooming in while in the hospital.  TDAP and RHOGAM given today    PE: /64 (BP Location: Right arm, Patient Position: Chair, Cuff Size: Adult Large)   Pulse 91   Temp 97.8  F (36.6  C) (Tympanic)   Resp 18   Ht 1.651 m (5' 5\")   Wt 80.7 kg (178 lb)   LMP 12/10/2018   BMI 29.62 kg/m     See OB flowsheet      A:  1. Encounter for supervision of low-risk first pregnancy in third trimester      2. Rh negative state in antepartum period    - rho(D) immune globulin (HYPERRHO/RHOGAM) injection 300 mcg  - THER/PROPH/DIAG INJ, SC/IM    3. Need for Tdap vaccination    - TDAP, IM (10 - 64 YRS) - Adacel  - ADMIN 1st VACCINE      Routine prenatal care  RTC 2 weeks.      Magnolia Herrera M.D.     "

## 2019-06-26 NOTE — NURSING NOTE
Prior to injection, verified patient identity using patient's name and date of birth.  Due to injection administration, patient instructed to remain in clinic for 15 minutes  afterwards, and to report any adverse reaction to me immediately.    rhogam    Drug Amount Wasted:  None.  Vial/Syringe: Syringe  Expiration Date:  6/28/2021  Alda William

## 2019-06-26 NOTE — NURSING NOTE
Screening Questionnaire for Adult Immunization    Are you sick today?   No   Do you have allergies to medications, food, a vaccine component or latex?   No   Have you ever had a serious reaction after receiving a vaccination?   No   Do you have a long-term health problem with heart disease, lung disease, asthma, kidney disease, metabolic disease (e.g. diabetes), anemia, or other blood disorder?   Yes asthma   Do you have cancer, leukemia, HIV/AIDS, or any other immune system problem?   No   In the past 3 months, have you taken medications that affect  your immune system, such as prednisone, other steroids, or anticancer drugs; drugs for the treatment of rheumatoid arthritis, Crohn s disease, or psoriasis; or have you had radiation treatments?   No   Have you had a seizure, or a brain or other nervous system problem?   No   During the past year, have you received a transfusion of blood or blood     products, or been given immune (gamma) globulin or antiviral drug?   No   For women: Are you pregnant or is there a chance you could become        pregnant during the next month?   yes   Have you received any vaccinations in the past 4 weeks?   No     Immunization questionnaire answers were all negative.        Per orders of Dr. Herrera, injection of adacel given by Alda William. Patient instructed to remain in clinic for 15 minutes afterwards, and to report any adverse reaction to me immediately.       Screening performed by Alda William on 6/26/2019 at 2:25 PM.

## 2019-07-09 ENCOUNTER — PRENATAL OFFICE VISIT (OUTPATIENT)
Dept: OBGYN | Facility: CLINIC | Age: 34
End: 2019-07-09
Payer: COMMERCIAL

## 2019-07-09 VITALS
WEIGHT: 177 LBS | DIASTOLIC BLOOD PRESSURE: 64 MMHG | BODY MASS INDEX: 29.49 KG/M2 | TEMPERATURE: 97.8 F | SYSTOLIC BLOOD PRESSURE: 101 MMHG | HEIGHT: 65 IN | RESPIRATION RATE: 18 BRPM | HEART RATE: 78 BPM

## 2019-07-09 DIAGNOSIS — Z34.03 ENCOUNTER FOR SUPERVISION OF LOW-RISK FIRST PREGNANCY IN THIRD TRIMESTER: Primary | ICD-10-CM

## 2019-07-09 PROCEDURE — 99207 ZZC PRENATAL VISIT: CPT | Performed by: OBSTETRICS & GYNECOLOGY

## 2019-07-09 ASSESSMENT — MIFFLIN-ST. JEOR: SCORE: 1518.75

## 2019-07-09 NOTE — PROGRESS NOTES
"CC: Here for routine prenatal visit @ 30w1d   HPI: + FM, no ctx, no LOF, no VB.  No complaints.     PE: /64 (BP Location: Right arm, Patient Position: Chair, Cuff Size: Adult Small)   Pulse 78   Temp 97.8  F (36.6  C) (Tympanic)   Resp 18   Ht 1.651 m (5' 5\")   Wt 80.3 kg (177 lb)   LMP 12/10/2018   BMI 29.45 kg/m     See OB flowsheet    A/P G1 @ 30w1d normal pregnancy    1. Routine prenatal care.  Encouraged patient to review contents of Prenatal Breastfeeding Education Toolkit. Offered opportunity to answer questions regarding the importance of skin to skin contact, early initiation of exclusive breastfeeding for the first six months and rooming in while in the hospital.     RTC 2 weeks.      Fabi So M.D.    "

## 2019-07-24 ENCOUNTER — PRENATAL OFFICE VISIT (OUTPATIENT)
Dept: OBGYN | Facility: CLINIC | Age: 34
End: 2019-07-24
Payer: COMMERCIAL

## 2019-07-24 VITALS
DIASTOLIC BLOOD PRESSURE: 64 MMHG | HEART RATE: 88 BPM | HEIGHT: 65 IN | TEMPERATURE: 97.3 F | RESPIRATION RATE: 16 BRPM | SYSTOLIC BLOOD PRESSURE: 101 MMHG | WEIGHT: 180.9 LBS | BODY MASS INDEX: 30.14 KG/M2

## 2019-07-24 DIAGNOSIS — Z34.03 ENCOUNTER FOR SUPERVISION OF NORMAL FIRST PREGNANCY IN THIRD TRIMESTER: Primary | ICD-10-CM

## 2019-07-24 PROCEDURE — 99207 ZZC PRENATAL VISIT: CPT | Performed by: OBSTETRICS & GYNECOLOGY

## 2019-07-24 ASSESSMENT — MIFFLIN-ST. JEOR: SCORE: 1536.44

## 2019-07-24 NOTE — PROGRESS NOTES
Concerns: feeling well  Doing well.  No concerns today.  No vaginal bleeding, LOF.  No contractions.  Discussed kick counts and fetal movement.  Reportable signs and symptoms discussed.    Discussed PTL, PROM, and when to call or come in.  RTC 2 weeks.   discussed PTL    Nicolas Turner MD

## 2019-08-08 ENCOUNTER — PRENATAL OFFICE VISIT (OUTPATIENT)
Dept: OBGYN | Facility: CLINIC | Age: 34
End: 2019-08-08
Payer: COMMERCIAL

## 2019-08-08 VITALS
DIASTOLIC BLOOD PRESSURE: 63 MMHG | HEART RATE: 79 BPM | BODY MASS INDEX: 30.07 KG/M2 | HEIGHT: 65 IN | RESPIRATION RATE: 16 BRPM | WEIGHT: 180.5 LBS | SYSTOLIC BLOOD PRESSURE: 104 MMHG | TEMPERATURE: 96.4 F

## 2019-08-08 DIAGNOSIS — Z34.03 ENCOUNTER FOR SUPERVISION OF NORMAL FIRST PREGNANCY IN THIRD TRIMESTER: Primary | ICD-10-CM

## 2019-08-08 PROCEDURE — 99207 ZZC PRENATAL VISIT: CPT | Performed by: OBSTETRICS & GYNECOLOGY

## 2019-08-08 ASSESSMENT — MIFFLIN-ST. JEOR: SCORE: 1534.62

## 2019-08-08 NOTE — PROGRESS NOTES
Concerns:   Doing well.  No concerns today.  No vaginal bleeding, LOF.  No contractions.  Cervix check next visit.  Cephalic position confirmed by Leopold maneuvers.  Reportable signs and symptoms discussed.  Discussed kick counts and fetal movement.  Discussed PTL, PROM, and when to call or come in.  RTC 2 weeks.    Nicolas Turner MD

## 2019-08-23 ENCOUNTER — PRENATAL OFFICE VISIT (OUTPATIENT)
Dept: OBGYN | Facility: CLINIC | Age: 34
End: 2019-08-23
Payer: COMMERCIAL

## 2019-08-23 VITALS
SYSTOLIC BLOOD PRESSURE: 104 MMHG | HEART RATE: 78 BPM | TEMPERATURE: 96.6 F | WEIGHT: 180 LBS | BODY MASS INDEX: 29.95 KG/M2 | DIASTOLIC BLOOD PRESSURE: 64 MMHG

## 2019-08-23 DIAGNOSIS — Z34.00 ENCOUNTER FOR SUPERVISION OF LOW-RISK FIRST PREGNANCY, ANTEPARTUM: Primary | ICD-10-CM

## 2019-08-23 DIAGNOSIS — Z3A.36 36 WEEKS GESTATION OF PREGNANCY: ICD-10-CM

## 2019-08-23 PROCEDURE — 99207 ZZC PRENATAL VISIT: CPT | Performed by: OBSTETRICS & GYNECOLOGY

## 2019-08-23 NOTE — PROGRESS NOTES
CC: Here for routine prenatal visit @ 36w4d   HPI:  Feeling well; denies contractions or LOF; reminded about GBS pos status; labor precautions reviewed      PE: /64 (BP Location: Left arm, Patient Position: Chair, Cuff Size: Adult Regular)   Pulse 78   Temp 96.6  F (35.9  C) (Tympanic)   Wt 81.6 kg (180 lb)   LMP 12/10/2018   Breastfeeding? No   BMI 29.95 kg/m     See OB flowsheet      A:  1. Encounter for supervision of low-risk first pregnancy, antepartum      2. 36 weeks gestation of pregnancy        Routine prenatal care  RTC 1 weeks.      Magnolia Herrera M.D.

## 2019-08-23 NOTE — NURSING NOTE
"Initial /64 (BP Location: Left arm, Patient Position: Chair, Cuff Size: Adult Regular)   Pulse 78   Temp 96.6  F (35.9  C) (Tympanic)   Wt 81.6 kg (180 lb)   LMP 12/10/2018   Breastfeeding? No   BMI 29.95 kg/m   Estimated body mass index is 29.95 kg/m  as calculated from the following:    Height as of 8/8/19: 1.651 m (5' 5\").    Weight as of this encounter: 81.6 kg (180 lb). .      Marilyn Nunez MA    "

## 2019-08-28 ENCOUNTER — PRENATAL OFFICE VISIT (OUTPATIENT)
Dept: OBGYN | Facility: CLINIC | Age: 34
End: 2019-08-28
Payer: COMMERCIAL

## 2019-08-28 VITALS
SYSTOLIC BLOOD PRESSURE: 108 MMHG | TEMPERATURE: 96.8 F | HEIGHT: 65 IN | WEIGHT: 182 LBS | BODY MASS INDEX: 30.32 KG/M2 | DIASTOLIC BLOOD PRESSURE: 70 MMHG | RESPIRATION RATE: 18 BRPM | HEART RATE: 84 BPM

## 2019-08-28 DIAGNOSIS — Z34.00 ENCOUNTER FOR SUPERVISION OF LOW-RISK FIRST PREGNANCY, ANTEPARTUM: Primary | ICD-10-CM

## 2019-08-28 PROCEDURE — 99207 ZZC PRENATAL VISIT: CPT | Performed by: OBSTETRICS & GYNECOLOGY

## 2019-08-28 ASSESSMENT — MIFFLIN-ST. JEOR: SCORE: 1536.43

## 2019-08-28 NOTE — PROGRESS NOTES
"CC: Here for routine prenatal visit @ 37w2d   HPI:  Feeling well; no real contractions as yet; all ready at home for new baby    PE: /70 (BP Location: Right arm, Patient Position: Chair, Cuff Size: Adult Small)   Pulse 84   Temp 96.8  F (36  C) (Tympanic)   Resp 18   Ht 1.651 m (5' 5\")   Wt 82.6 kg (182 lb)   LMP 12/10/2018   BMI 30.29 kg/m     See OB flowsheet      A:  1. Encounter for supervision of low-risk first pregnancy, antepartum        Routine prenatal care  RTC 1 weeks.      Magnolia Herrera M.D.     "

## 2019-09-05 ENCOUNTER — PRENATAL OFFICE VISIT (OUTPATIENT)
Dept: OBGYN | Facility: CLINIC | Age: 34
End: 2019-09-05
Payer: COMMERCIAL

## 2019-09-05 VITALS
BODY MASS INDEX: 30.21 KG/M2 | RESPIRATION RATE: 16 BRPM | WEIGHT: 181.3 LBS | TEMPERATURE: 96.6 F | SYSTOLIC BLOOD PRESSURE: 109 MMHG | DIASTOLIC BLOOD PRESSURE: 70 MMHG | HEART RATE: 81 BPM | HEIGHT: 65 IN

## 2019-09-05 DIAGNOSIS — Z34.03 ENCOUNTER FOR SUPERVISION OF NORMAL FIRST PREGNANCY IN THIRD TRIMESTER: Primary | ICD-10-CM

## 2019-09-05 PROCEDURE — 99207 ZZC PRENATAL VISIT: CPT | Performed by: OBSTETRICS & GYNECOLOGY

## 2019-09-05 ASSESSMENT — MIFFLIN-ST. JEOR: SCORE: 1533.25

## 2019-09-05 NOTE — NURSING NOTE
"Chief Complaint   Patient presents with     Prenatal Care       Initial /70 (BP Location: Right arm, Patient Position: Chair, Cuff Size: Adult Regular)   Pulse 81   Temp 96.6  F (35.9  C) (Tympanic)   Resp 16   Ht 1.651 m (5' 5\")   Wt 82.2 kg (181 lb 4.8 oz)   LMP 12/10/2018   BMI 30.17 kg/m   Estimated body mass index is 30.17 kg/m  as calculated from the following:    Height as of this encounter: 1.651 m (5' 5\").    Weight as of this encounter: 82.2 kg (181 lb 4.8 oz).  Medications and allergies reviewed.    Tresa SONG CMA (AAMA)    "

## 2019-09-05 NOTE — PROGRESS NOTES
Concerns:   Doing well.  No concerns today.  No vaginal bleeding, LOF, contractions.  No HA, RUQ pain, N/V, visual changes.  Cervix is unchanged from previous exam.  Cephalic position confirmed by Leopold maneuvers and cervical exam.  Discussed signs of labor and when to call or come in.  Reportable signs and symptoms discussed.  Labor precautions discussed.  RTC 1 week.    Nicolas Turner MD

## 2019-09-11 ENCOUNTER — PRENATAL OFFICE VISIT (OUTPATIENT)
Dept: OBGYN | Facility: CLINIC | Age: 34
End: 2019-09-11
Payer: COMMERCIAL

## 2019-09-11 VITALS
TEMPERATURE: 96.3 F | WEIGHT: 183.5 LBS | HEIGHT: 65 IN | BODY MASS INDEX: 30.57 KG/M2 | HEART RATE: 90 BPM | SYSTOLIC BLOOD PRESSURE: 103 MMHG | DIASTOLIC BLOOD PRESSURE: 69 MMHG | RESPIRATION RATE: 18 BRPM

## 2019-09-11 DIAGNOSIS — Z34.00 ENCOUNTER FOR SUPERVISION OF LOW-RISK FIRST PREGNANCY, ANTEPARTUM: Primary | ICD-10-CM

## 2019-09-11 PROCEDURE — 99207 ZZC PRENATAL VISIT: CPT | Performed by: OBSTETRICS & GYNECOLOGY

## 2019-09-11 ASSESSMENT — MIFFLIN-ST. JEOR: SCORE: 1543.23

## 2019-09-11 NOTE — NURSING NOTE
"Initial /69 (BP Location: Right arm, Patient Position: Chair, Cuff Size: Adult Regular)   Pulse 90   Temp 96.3  F (35.7  C) (Tympanic)   Resp 18   Ht 1.651 m (5' 5\")   Wt 83.2 kg (183 lb 8 oz)   LMP 12/10/2018   BMI 30.54 kg/m   Estimated body mass index is 30.54 kg/m  as calculated from the following:    Height as of this encounter: 1.651 m (5' 5\").    Weight as of this encounter: 83.2 kg (183 lb 8 oz). .      "

## 2019-09-11 NOTE — PROGRESS NOTES
"CC: Here for routine prenatal visit @ 39w2d   HPI:  Feeling well; occasional UC; no LOF    PE: /69 (BP Location: Right arm, Patient Position: Chair, Cuff Size: Adult Regular)   Pulse 90   Temp 96.3  F (35.7  C) (Tympanic)   Resp 18   Ht 1.651 m (5' 5\")   Wt 83.2 kg (183 lb 8 oz)   LMP 12/10/2018   BMI 30.54 kg/m     See OB flowsheet      A:  1. Encounter for supervision of low-risk first pregnancy, antepartum        Routine prenatal care  RTC 1 weeks.      Magnolia Herrera M.D.     "

## 2019-09-15 ENCOUNTER — ANESTHESIA EVENT (OUTPATIENT)
Dept: OBGYN | Facility: CLINIC | Age: 34
End: 2019-09-15
Payer: COMMERCIAL

## 2019-09-15 ENCOUNTER — HOSPITAL ENCOUNTER (INPATIENT)
Facility: CLINIC | Age: 34
LOS: 3 days | Discharge: HOME | End: 2019-09-18
Attending: OBSTETRICS & GYNECOLOGY | Admitting: OBSTETRICS & GYNECOLOGY
Payer: COMMERCIAL

## 2019-09-15 ENCOUNTER — ANESTHESIA (OUTPATIENT)
Dept: OBGYN | Facility: CLINIC | Age: 34
End: 2019-09-15
Payer: COMMERCIAL

## 2019-09-15 PROBLEM — Z34.00 SUPERVISION OF NORMAL FIRST PREGNANCY: Status: ACTIVE | Noted: 2019-09-15

## 2019-09-15 LAB
ABO + RH BLD: NORMAL
ABO + RH BLD: NORMAL
SPECIMEN EXP DATE BLD: NORMAL

## 2019-09-15 PROCEDURE — 12000000 ZZH R&B MED SURG/OB

## 2019-09-15 PROCEDURE — 86900 BLOOD TYPING SEROLOGIC ABO: CPT | Performed by: OBSTETRICS & GYNECOLOGY

## 2019-09-15 PROCEDURE — 27110038 ZZH RX 271: Performed by: NURSE ANESTHETIST, CERTIFIED REGISTERED

## 2019-09-15 PROCEDURE — 25000125 ZZHC RX 250: Performed by: NURSE ANESTHETIST, CERTIFIED REGISTERED

## 2019-09-15 PROCEDURE — 25000128 H RX IP 250 OP 636: Performed by: NURSE ANESTHETIST, CERTIFIED REGISTERED

## 2019-09-15 PROCEDURE — 86780 TREPONEMA PALLIDUM: CPT | Performed by: OBSTETRICS & GYNECOLOGY

## 2019-09-15 PROCEDURE — 86901 BLOOD TYPING SEROLOGIC RH(D): CPT | Performed by: OBSTETRICS & GYNECOLOGY

## 2019-09-15 PROCEDURE — 37000011 ZZH ANESTHESIA WARD SERVICE: Performed by: NURSE ANESTHETIST, CERTIFIED REGISTERED

## 2019-09-15 PROCEDURE — 25000128 H RX IP 250 OP 636: Performed by: OBSTETRICS & GYNECOLOGY

## 2019-09-15 PROCEDURE — 25800030 ZZH RX IP 258 OP 636: Performed by: NURSE ANESTHETIST, CERTIFIED REGISTERED

## 2019-09-15 PROCEDURE — 3E0R3BZ INTRODUCTION OF ANESTHETIC AGENT INTO SPINAL CANAL, PERCUTANEOUS APPROACH: ICD-10-PCS | Performed by: OBSTETRICS & GYNECOLOGY

## 2019-09-15 PROCEDURE — 25800030 ZZH RX IP 258 OP 636: Performed by: OBSTETRICS & GYNECOLOGY

## 2019-09-15 PROCEDURE — 00HU33Z INSERTION OF INFUSION DEVICE INTO SPINAL CANAL, PERCUTANEOUS APPROACH: ICD-10-PCS | Performed by: OBSTETRICS & GYNECOLOGY

## 2019-09-15 PROCEDURE — 40000671 ZZH STATISTIC ANESTHESIA CASE

## 2019-09-15 PROCEDURE — 10907ZC DRAINAGE OF AMNIOTIC FLUID, THERAPEUTIC FROM PRODUCTS OF CONCEPTION, VIA NATURAL OR ARTIFICIAL OPENING: ICD-10-PCS | Performed by: OBSTETRICS & GYNECOLOGY

## 2019-09-15 PROCEDURE — 36415 COLL VENOUS BLD VENIPUNCTURE: CPT | Performed by: OBSTETRICS & GYNECOLOGY

## 2019-09-15 RX ORDER — SODIUM CHLORIDE, SODIUM LACTATE, POTASSIUM CHLORIDE, CALCIUM CHLORIDE 600; 310; 30; 20 MG/100ML; MG/100ML; MG/100ML; MG/100ML
INJECTION, SOLUTION INTRAVENOUS CONTINUOUS
Status: DISCONTINUED | OUTPATIENT
Start: 2019-09-15 | End: 2019-09-16

## 2019-09-15 RX ORDER — PENICILLIN G POTASSIUM 5000000 [IU]/1
5 INJECTION, POWDER, FOR SOLUTION INTRAMUSCULAR; INTRAVENOUS ONCE
Status: COMPLETED | OUTPATIENT
Start: 2019-09-15 | End: 2019-09-15

## 2019-09-15 RX ORDER — OXYTOCIN/0.9 % SODIUM CHLORIDE 30/500 ML
100-340 PLASTIC BAG, INJECTION (ML) INTRAVENOUS CONTINUOUS PRN
Status: COMPLETED | OUTPATIENT
Start: 2019-09-15 | End: 2019-09-16

## 2019-09-15 RX ORDER — LIDOCAINE HYDROCHLORIDE AND EPINEPHRINE 15; 5 MG/ML; UG/ML
INJECTION, SOLUTION EPIDURAL PRN
Status: DISCONTINUED | OUTPATIENT
Start: 2019-09-15 | End: 2019-09-16

## 2019-09-15 RX ORDER — NALOXONE HYDROCHLORIDE 0.4 MG/ML
.1-.4 INJECTION, SOLUTION INTRAMUSCULAR; INTRAVENOUS; SUBCUTANEOUS
Status: DISCONTINUED | OUTPATIENT
Start: 2019-09-15 | End: 2019-09-16

## 2019-09-15 RX ORDER — FENTANYL CITRATE 50 UG/ML
INJECTION, SOLUTION INTRAMUSCULAR; INTRAVENOUS PRN
Status: DISCONTINUED | OUTPATIENT
Start: 2019-09-15 | End: 2019-09-16

## 2019-09-15 RX ORDER — NALBUPHINE HYDROCHLORIDE 10 MG/ML
2.5-5 INJECTION, SOLUTION INTRAMUSCULAR; INTRAVENOUS; SUBCUTANEOUS EVERY 6 HOURS PRN
Status: DISCONTINUED | OUTPATIENT
Start: 2019-09-15 | End: 2019-09-16

## 2019-09-15 RX ORDER — BUPIVACAINE HYDROCHLORIDE 2.5 MG/ML
INJECTION, SOLUTION EPIDURAL; INFILTRATION; INTRACAUDAL PRN
Status: DISCONTINUED | OUTPATIENT
Start: 2019-09-15 | End: 2019-09-16

## 2019-09-15 RX ORDER — CARBOPROST TROMETHAMINE 250 UG/ML
250 INJECTION, SOLUTION INTRAMUSCULAR
Status: DISCONTINUED | OUTPATIENT
Start: 2019-09-15 | End: 2019-09-16

## 2019-09-15 RX ORDER — OXYTOCIN 10 [USP'U]/ML
10 INJECTION, SOLUTION INTRAMUSCULAR; INTRAVENOUS
Status: DISCONTINUED | OUTPATIENT
Start: 2019-09-15 | End: 2019-09-16

## 2019-09-15 RX ORDER — OXYCODONE AND ACETAMINOPHEN 5; 325 MG/1; MG/1
1 TABLET ORAL
Status: DISCONTINUED | OUTPATIENT
Start: 2019-09-15 | End: 2019-09-16

## 2019-09-15 RX ORDER — ONDANSETRON 2 MG/ML
4 INJECTION INTRAMUSCULAR; INTRAVENOUS EVERY 6 HOURS PRN
Status: DISCONTINUED | OUTPATIENT
Start: 2019-09-15 | End: 2019-09-16

## 2019-09-15 RX ORDER — EPHEDRINE SULFATE 50 MG/ML
5 INJECTION, SOLUTION INTRAMUSCULAR; INTRAVENOUS; SUBCUTANEOUS
Status: DISCONTINUED | OUTPATIENT
Start: 2019-09-15 | End: 2019-09-16

## 2019-09-15 RX ORDER — ACETAMINOPHEN 325 MG/1
650 TABLET ORAL EVERY 4 HOURS PRN
Status: DISCONTINUED | OUTPATIENT
Start: 2019-09-15 | End: 2019-09-16

## 2019-09-15 RX ORDER — LIDOCAINE HYDROCHLORIDE 10 MG/ML
INJECTION, SOLUTION INFILTRATION; PERINEURAL PRN
Status: DISCONTINUED | OUTPATIENT
Start: 2019-09-15 | End: 2019-09-16

## 2019-09-15 RX ORDER — IBUPROFEN 800 MG/1
800 TABLET, FILM COATED ORAL
Status: DISCONTINUED | OUTPATIENT
Start: 2019-09-15 | End: 2019-09-16

## 2019-09-15 RX ORDER — METHYLERGONOVINE MALEATE 0.2 MG/ML
200 INJECTION INTRAVENOUS
Status: DISCONTINUED | OUTPATIENT
Start: 2019-09-15 | End: 2019-09-16

## 2019-09-15 RX ORDER — NALOXONE HYDROCHLORIDE 0.4 MG/ML
.1-.4 INJECTION, SOLUTION INTRAMUSCULAR; INTRAVENOUS; SUBCUTANEOUS
Status: DISCONTINUED | OUTPATIENT
Start: 2019-09-15 | End: 2019-09-15

## 2019-09-15 RX ADMIN — BUPIVACAINE HYDROCHLORIDE 3 ML: 2.5 INJECTION, SOLUTION EPIDURAL; INFILTRATION; INTRACAUDAL at 19:19

## 2019-09-15 RX ADMIN — SODIUM CHLORIDE, POTASSIUM CHLORIDE, SODIUM LACTATE AND CALCIUM CHLORIDE 1000 ML: 600; 310; 30; 20 INJECTION, SOLUTION INTRAVENOUS at 17:35

## 2019-09-15 RX ADMIN — SODIUM CHLORIDE, POTASSIUM CHLORIDE, SODIUM LACTATE AND CALCIUM CHLORIDE 1000 ML: 600; 310; 30; 20 INJECTION, SOLUTION INTRAVENOUS at 20:07

## 2019-09-15 RX ADMIN — SODIUM CHLORIDE, POTASSIUM CHLORIDE, SODIUM LACTATE AND CALCIUM CHLORIDE 500 ML: 600; 310; 30; 20 INJECTION, SOLUTION INTRAVENOUS at 23:25

## 2019-09-15 RX ADMIN — Medication 5 MG: at 19:32

## 2019-09-15 RX ADMIN — Medication 5 MG: at 19:28

## 2019-09-15 RX ADMIN — LIDOCAINE HYDROCHLORIDE 10 MG: 10 INJECTION, SOLUTION INFILTRATION; PERINEURAL at 19:06

## 2019-09-15 RX ADMIN — FENTANYL CITRATE 100 MCG: 50 INJECTION, SOLUTION INTRAMUSCULAR; INTRAVENOUS at 19:17

## 2019-09-15 RX ADMIN — SODIUM CHLORIDE 2.5 MILLION UNITS: 9 INJECTION, SOLUTION INTRAVENOUS at 22:09

## 2019-09-15 RX ADMIN — PENICILLIN G POTASSIUM 5 MILLION UNITS: 5000000 POWDER, FOR SOLUTION INTRAMUSCULAR; INTRAPLEURAL; INTRATHECAL; INTRAVENOUS at 18:00

## 2019-09-15 RX ADMIN — LIDOCAINE HYDROCHLORIDE AND EPINEPHRINE 60 MG: 15; 5 INJECTION, SOLUTION EPIDURAL at 19:15

## 2019-09-15 RX ADMIN — Medication 5 MG: at 19:25

## 2019-09-15 RX ADMIN — Medication 10 ML/HR: at 19:32

## 2019-09-15 RX ADMIN — BUPIVACAINE HYDROCHLORIDE 2 ML: 2.5 INJECTION, SOLUTION EPIDURAL; INFILTRATION; INTRACAUDAL at 19:21

## 2019-09-15 RX ADMIN — ONDANSETRON 4 MG: 2 INJECTION INTRAMUSCULAR; INTRAVENOUS at 23:22

## 2019-09-15 NOTE — ANESTHESIA PREPROCEDURE EVALUATION
Anesthesia Pre-Procedure Evaluation    Patient: Teresa Salinas   MRN: [id number] : 1985          Preoperative Diagnosis: * No surgery found *        Past Medical History:   Diagnosis Date     Allergic rhinitis 3/11/2014     Asthma 3/11/2014     Chickenpox      Depression      Family history of thyroid disease 2015     Past Surgical History:   Procedure Laterality Date     Arthoscopic right ankle Right      OSTEOTOMY ANKLE Right        Anesthesia Evaluation     . Pt has had prior anesthetic. Type: General    History of anesthetic complications   - PONV        ROS/MED HX    ENT/Pulmonary:     (+)allergic rhinitis, Intermittent asthma , . .    Neurologic:  - neg neurologic ROS     Cardiovascular:  - neg cardiovascular ROS       METS/Exercise Tolerance:     Hematologic:  - neg hematologic  ROS       Musculoskeletal:  - neg musculoskeletal ROS       GI/Hepatic:     (+) GERD       Renal/Genitourinary:  - ROS Renal section negative       Endo:  - neg endo ROS       Psychiatric:     (+) psychiatric history depression      Infectious Disease:  - neg infectious disease ROS       Malignancy:      - no malignancy   Other:    - neg other ROS                      Physical Exam  Normal systems: cardiovascular, pulmonary and dental    Airway   Mallampati: I  TM distance: >3 FB  Neck ROM: full    Dental     Cardiovascular       Pulmonary             Lab Results   Component Value Date    WBC 10.1 2019    HGB 12.0 2019    HCT 36.1 2019     2019    GLC 87 2015    TSH 1.38 2015    T4 1.05 2015       Preop Vitals  BP Readings from Last 3 Encounters:   19 103/69   19 109/70   19 108/70    Pulse Readings from Last 3 Encounters:   19 90   19 81   19 84      Resp Readings from Last 3 Encounters:   19 18   19 16   19 18    SpO2 Readings from Last 3 Encounters:   19 98%   18 97%   18 99%      Temp  "Readings from Last 1 Encounters:   09/11/19 35.7  C (96.3  F) (Tympanic)    Ht Readings from Last 1 Encounters:   09/11/19 1.651 m (5' 5\")      Wt Readings from Last 1 Encounters:   09/11/19 83.2 kg (183 lb 8 oz)    Estimated body mass index is 30.54 kg/m  as calculated from the following:    Height as of 9/11/19: 1.651 m (5' 5\").    Weight as of 9/11/19: 83.2 kg (183 lb 8 oz).       Anesthesia Plan      History & Physical Review  History and physical reviewed and following examination; no interval change.    ASA Status:  2 .        Plan for Epidural   PONV prophylaxis:  Ondansetron (or other 5HT-3) and Dexamethasone or Solumedrol       Postoperative Care  Postoperative pain management:  Multi-modal analgesia.      Consents  Anesthetic plan, risks, benefits and alternatives discussed with:  Patient..                 BETTINA Montejo CRNA  "

## 2019-09-15 NOTE — PLAN OF CARE
Patient , 39w+6d, arrived to  from home at 1701 for labor eval. Patient states has been having contractions since 0600 today but they have increased in intensity and are coming more frequently now. Patient breathing through contractions upon arrival. SVE /-1 with bulging bag. Denies LOF, having bloody show today, lost mucus plug on Friday. Patient rating contraction pain in lower abdomen /10. Would like epidural for pain management. Patient's spouse, Alvaro, at bedside and supportive.

## 2019-09-15 NOTE — H&P
[facility] Labor and Delivery H&P  September 15, 2019  Teresa Salinas  [id number]      HPI: Teresa Salinas is a 34 year old  at 39w6d by LMP c/w 8w3d US, here with spontaneous labor starting around 0600 this morning.     She is currently 5cm/90%/-1, RH-, GBX+, tatyana every 2-4 minutes. She states that she is feeling well today with no acute concerns of symptoms of acute illness. She is having + FM, ctx every 2-4 minutes, scant VB/spotting, no LOF.  She denies fever, HA, scotoma, nausea, vomiting, CP, SOB, RUQ pain, constipation, diarrhea, and acute swelling.        Pregnancy notable for: No concerns   --    OBHX:   OB History    Para Term  AB Living   1 0 0 0 0 0   SAB TAB Ectopic Multiple Live Births   0 0 0 0 0      # Outcome Date GA Lbr Krishna/2nd Weight Sex Delivery Anes PTL Lv   1 Current                MedicalHX:   Past Medical History:   Diagnosis Date     Allergic rhinitis 3/11/2014     Asthma 3/11/2014     Chickenpox      Depression      Family history of thyroid disease 2015       SurgicalHX:   Past Surgical History:   Procedure Laterality Date     Arthoscopic right ankle Right      OSTEOTOMY ANKLE Right        Medications:     No current facility-administered medications on file prior to encounter.   Current Outpatient Medications on File Prior to Encounter:  cetirizine (ZYRTEC) 10 MG tablet Take 1 tablet (10 mg) by mouth every evening   Cholecalciferol (VITAMIN D) 2000 UNITS tablet Take 2,000 Units by mouth daily   cromolyn (OPTICROM) 4 % ophthalmic solution Place 1 drop into both eyes 4 times daily as needed (Itchy/watery eyes)   Prenatal Vit-Fe Fumarate-FA (PRENATAL MULTIVITAMIN W/IRON) 27-0.8 MG tablet Take 1 tablet by mouth daily   albuterol (PROAIR HFA/PROVENTIL HFA/VENTOLIN HFA) 108 (90 Base) MCG/ACT Inhaler Inhale 2 puffs into the lungs every 4 hours as needed for shortness of breath / dyspnea or wheezing (Patient not taking: Reported on 2019)    EPINEPHrine (AUVI-Q) 0.3 MG/0.3ML injection 2-pack Inject 0.3 mLs (0.3 mg) into the muscle as needed for anaphylaxis (Patient not taking: Reported on 9/11/2019)   Ferrous Sulfate (IRON SUPPLEMENT PO)    ORDER FOR ALLERGEN IMMUNOTHERAPY Name of Mix: Mix #1  Dog, GrassDog Hair-Dander, A. P.  1:100 w/v, HS  1.0 ml Vasquez (Std) 100,000 BAU/mL, HS 0.3 mlDiluent: HSA qs to 5ml (Patient not taking: Reported on 8/28/2019)       Allergies:  Allergies   Allergen Reactions     Contrast Dye Shortness Of Breath     Clindamycin Hives     Diagnostic X-Ray Materials Itching     Itching, sneezing.       FamilyHX:  Family History   Problem Relation Age of Onset     Diabetes Father         typeII     Hypertension Father      Hyperlipidemia Father      Depression Father      Seasonal/Environmental Allergies Father      Hyperlipidemia Maternal Grandmother      Hyperlipidemia Mother      Thyroid Disease Mother      Seasonal/Environmental Allergies Mother      Colon Cancer Maternal Grandfather      Asthma Sister      Seasonal/Environmental Allergies Sister      Other - See Comments Sister         clotting disorder     Thyroid Disease Sister      Chronic Obstructive Pulmonary Disease Paternal Grandmother      Esophageal Cancer Paternal Grandfather        SocialHX:   Social History     Socioeconomic History     Marital status:      Spouse name: Braxton     Number of children: 0     Years of education: 16     Highest education level: None   Occupational History     Occupation: Registered Nurse     Employer: [employer]     Comment: 5.5 years   Social Needs     Financial resource strain: None     Food insecurity:     Worry: None     Inability: None     Transportation needs:     Medical: None     Non-medical: None   Tobacco Use     Smoking status: Never Smoker     Smokeless tobacco: Never Used   Substance and Sexual Activity     Alcohol use: No     Alcohol/week: 0.0 oz     Drug use: No     Sexual activity: Yes      Partners: Male   Lifestyle     Physical activity:     Days per week: None     Minutes per session: None     Stress: None   Relationships     Social connections:     Talks on phone: None     Gets together: None     Attends Restorationist service: None     Active member of club or organization: None     Attends meetings of clubs or organizations: None     Relationship status: None     Intimate partner violence:     Fear of current or ex partner: None     Emotionally abused: None     Physically abused: None     Forced sexual activity: None   Other Topics Concern     Parent/sibling w/ CABG, MI or angioplasty before 65F 55M? No   Social History Narrative    November 26, 2018            ENVIRONMENTAL HISTORY: The [place] the [place] The home is heated with a forced air and gas furnace. They do have central air conditioning. The patient's bedroom is furnished with feather/wool bedding or pillows and carpeting in bedroom.  Pets inside the house include 1 dog(s). There is no history of cockroach or mice infestation. There is/are 0 smokers in the house.  The house does not have a damp basement.        ROS: 10-point ROS negative except as in HPI: there were no pertinent positives noted in the ROS.     Physical Exam:  Vitals:    09/15/19 1733   BP: 112/80   Resp: 16   Temp: 98.1  F (36.7  C)   TempSrc: Oral     GEN: resting comfortably in bed, NAD   CV: RRR, no murmurs  PULM: CTAB, no increased work of breathing, no cough/wheeze   ABD: soft, gravid, non-tender, non-distended  EXT: No edema, Non-tender to palpation  CVX: 5cm/90/-1  Presentation: Cephalic by cervical exam  EFW: 8 lbs  Membranes: Intact, bulging bag on admission    NST:  FHT: baseline 120, Moderage variability, + accels, - decels  TOCO: contractions every 2-4 minutes, strong    Ultrasounds:  Dating US: 2/8/2019: GA 8w3d,  Anatomy scan: 4/30/2019: GA 20w5d, placenta posterior withno evidence for previa, SLIM  unremarkable, anatomy unremarkable.     Labs:    Lab Results   Component Value Date    ABO A 2019    RH Neg 2019    AS Neg 2019    HEPBANG Nonreactive 2019    CHPCRT Negative 2019    GCPCRT Negative 2019    HGB 12.0 2019       GBS Status:   No results found for: GBS    Lab Results   Component Value Date    PAP NIL 2018       A/P: Teresa Salinas is a 34 year old female  at 39w6d by US at 8w3d, here for evaluation of spontaneous labor. Arrived having strong contractions every 2-4 minutes, , category 1 tracing. Mom in NAD, who would like an epidural for pain management in labor.     start penicillin for GBS  will plan RHOgam post delivery    Admit to L&D. Place PIV.    Labor: Anticipate vaginal delivery  FWB: Category 1 FHT.  Continue EFM and toco  Pain: Desires Epidural for analgesia  PNC: Rh -, GBS +      James Marie MS-III    I interviewed and examined the patient myself along with the medical student and agree with the documentation above.   Uma Watkins MD  OB/GYN  9/15/2019

## 2019-09-16 LAB
BLOOD BANK CMNT PATIENT-IMP: NORMAL
BLOOD BANK CMNT PATIENT-IMP: NORMAL
T PALLIDUM AB SER QL: NONREACTIVE

## 2019-09-16 PROCEDURE — 25000125 ZZHC RX 250: Performed by: OBSTETRICS & GYNECOLOGY

## 2019-09-16 PROCEDURE — 12000000 ZZH R&B MED SURG/OB

## 2019-09-16 PROCEDURE — 59400 OBSTETRICAL CARE: CPT | Performed by: OBSTETRICS & GYNECOLOGY

## 2019-09-16 PROCEDURE — 72200001 ZZH LABOR CARE VAGINAL DELIVERY SINGLE

## 2019-09-16 PROCEDURE — 25000132 ZZH RX MED GY IP 250 OP 250 PS 637: Performed by: OBSTETRICS & GYNECOLOGY

## 2019-09-16 PROCEDURE — 0KQM0ZZ REPAIR PERINEUM MUSCLE, OPEN APPROACH: ICD-10-PCS | Performed by: OBSTETRICS & GYNECOLOGY

## 2019-09-16 RX ORDER — OXYTOCIN 10 [USP'U]/ML
10 INJECTION, SOLUTION INTRAMUSCULAR; INTRAVENOUS
Status: DISCONTINUED | OUTPATIENT
Start: 2019-09-16 | End: 2019-09-18 | Stop reason: HOSPADM

## 2019-09-16 RX ORDER — AMOXICILLIN 250 MG
1 CAPSULE ORAL 2 TIMES DAILY
Status: DISCONTINUED | OUTPATIENT
Start: 2019-09-16 | End: 2019-09-18 | Stop reason: HOSPADM

## 2019-09-16 RX ORDER — ACETAMINOPHEN 325 MG/1
650 TABLET ORAL EVERY 4 HOURS PRN
Status: DISCONTINUED | OUTPATIENT
Start: 2019-09-16 | End: 2019-09-18 | Stop reason: HOSPADM

## 2019-09-16 RX ORDER — IBUPROFEN 800 MG/1
800 TABLET, FILM COATED ORAL EVERY 6 HOURS PRN
Status: DISCONTINUED | OUTPATIENT
Start: 2019-09-16 | End: 2019-09-18 | Stop reason: HOSPADM

## 2019-09-16 RX ORDER — NALOXONE HYDROCHLORIDE 0.4 MG/ML
.1-.4 INJECTION, SOLUTION INTRAMUSCULAR; INTRAVENOUS; SUBCUTANEOUS
Status: DISCONTINUED | OUTPATIENT
Start: 2019-09-16 | End: 2019-09-18 | Stop reason: HOSPADM

## 2019-09-16 RX ORDER — CROMOLYN SODIUM 40 MG/ML
1 SOLUTION/ DROPS OPHTHALMIC 4 TIMES DAILY PRN
Status: DISCONTINUED | OUTPATIENT
Start: 2019-09-16 | End: 2019-09-18 | Stop reason: HOSPADM

## 2019-09-16 RX ORDER — CETIRIZINE HYDROCHLORIDE 10 MG/1
10 TABLET ORAL DAILY
Status: DISCONTINUED | OUTPATIENT
Start: 2019-09-16 | End: 2019-09-18 | Stop reason: HOSPADM

## 2019-09-16 RX ORDER — LANOLIN 100 %
OINTMENT (GRAM) TOPICAL
Status: DISCONTINUED | OUTPATIENT
Start: 2019-09-16 | End: 2019-09-18 | Stop reason: HOSPADM

## 2019-09-16 RX ORDER — AMOXICILLIN 250 MG
2 CAPSULE ORAL 2 TIMES DAILY
Status: DISCONTINUED | OUTPATIENT
Start: 2019-09-16 | End: 2019-09-18 | Stop reason: HOSPADM

## 2019-09-16 RX ORDER — OXYTOCIN/0.9 % SODIUM CHLORIDE 30/500 ML
100 PLASTIC BAG, INJECTION (ML) INTRAVENOUS CONTINUOUS
Status: DISCONTINUED | OUTPATIENT
Start: 2019-09-16 | End: 2019-09-18 | Stop reason: HOSPADM

## 2019-09-16 RX ORDER — OXYTOCIN/0.9 % SODIUM CHLORIDE 30/500 ML
340 PLASTIC BAG, INJECTION (ML) INTRAVENOUS CONTINUOUS PRN
Status: DISCONTINUED | OUTPATIENT
Start: 2019-09-16 | End: 2019-09-18 | Stop reason: HOSPADM

## 2019-09-16 RX ORDER — HYDROCORTISONE 2.5 %
CREAM (GRAM) TOPICAL 3 TIMES DAILY PRN
Status: DISCONTINUED | OUTPATIENT
Start: 2019-09-16 | End: 2019-09-18 | Stop reason: HOSPADM

## 2019-09-16 RX ORDER — BISACODYL 10 MG
10 SUPPOSITORY, RECTAL RECTAL DAILY PRN
Status: DISCONTINUED | OUTPATIENT
Start: 2019-09-18 | End: 2019-09-18 | Stop reason: HOSPADM

## 2019-09-16 RX ADMIN — ACETAMINOPHEN 650 MG: 325 TABLET, FILM COATED ORAL at 20:14

## 2019-09-16 RX ADMIN — IBUPROFEN 800 MG: 800 TABLET ORAL at 22:41

## 2019-09-16 RX ADMIN — SENNOSIDES AND DOCUSATE SODIUM 1 TABLET: 8.6; 5 TABLET ORAL at 20:15

## 2019-09-16 RX ADMIN — IBUPROFEN 800 MG: 800 TABLET ORAL at 09:25

## 2019-09-16 RX ADMIN — IBUPROFEN 800 MG: 800 TABLET ORAL at 15:42

## 2019-09-16 RX ADMIN — Medication 340 ML/HR: at 02:25

## 2019-09-16 RX ADMIN — SENNOSIDES AND DOCUSATE SODIUM 1 TABLET: 8.6; 5 TABLET ORAL at 09:25

## 2019-09-16 RX ADMIN — CETIRIZINE HYDROCHLORIDE 10 MG: 10 TABLET, FILM COATED ORAL at 09:25

## 2019-09-16 NOTE — PROVIDER NOTIFICATION
09/15/19 1935   Fetal Assessment   Fetal HR Assessment Method external US   Fetal HR (beats/min) 120   Fetal Heart Baseline Rate normal range   Fetal HR Variability minimal (detectable, amplitude less than or equal to 5 bpm)   Fetal HR Accelerations absent   Fetal HR Decelerations prolonged;late   Interventions for intrauterine resuscitation IV Fluid bolus;O2 therapy;Reposition;Sterile vaginal exam;Provider notified;Fetal monitoring/strip review every 15 minutes;Blood pressure check;Emotional support   Prolonged late deceleration on FHT's lasting 4 minutes with olga to the 60's. Blood pressure at lowest 85/53, treated with ephedrine x3 doses with good response. Additional interventions listed above. Improvement in FHT's noted with baseline of 145, minimal variability. Contractions occurring every 1.5-3 minutes and lasting 60-80 seconds. Patient is comfortable with epidural.

## 2019-09-16 NOTE — L&D DELIVERY NOTE
"Delivery Summary    Teresa Salinas MRN# [id number]   Age: 34 year old YOB: 1985     ASSESSMENT & PLAN: Ms. Salinas is a 33 yo  at 40w0d by LMP c/w 8w3d US who presents in spontaneous active labor. Pregnancy complicated by Rh negative status and GBS bacteruria so PCN was administered. An epidural was placed for pain management. She progressed to complete dilation, pushed to a slow crown and delivered a vigorous male infant vertex, direct OA via . APGARs 8 and 9. The placenta delivered via gentle cord traction and was noted to be intact with a 3V cord. She had a 2nd degree perineal laceration that was repaired with 3-0 vicryl. Fundus was firm with fundal massage and IV pitocin. QBL 300cc. Mom and baby were stable for transport to postpartum.   \"Adan\"        Labor Event Times    Labor onset date:  9/15/19 Onset time:  11:00 AM   Dilation complete date:  9/15/19 Complete time:  10:00 PM   Start pushing date/time:  2019 0012      Labor Length    1st Stage (hrs):  11 (min):  0   2nd Stage (hrs):  4 (min):  13   3rd Stage (hrs):  0 (min):  3      Labor Events     labor?:  No   steroids:  Full Course  Labor Type:  Spontaneous, AROM  Predominate monitoring during 1st stage:  continuous electronic fetal monitoring     Antibiotics received during labor?:  Yes  Reason for Antibiotics:  GBS  Antibiotics received for GBS:  Penicillin  Antibiotics Given (GBS):  Less than or equal to 4 hours prior to delivery     Rupture identifier:  Sac 1  Rupture date/time: 9/15/19 2200   Rupture type:  Artificial Rupture of Membranes  Fluid color:  Clear  Fluid odor:  Normal     1:1 continuous labor support provided by?:  RN Labor partogram used?:  no      Delivery/Placenta Date and Time    Delivery Date:  19 Delivery Time:   2:13 AM   Placenta Date/Time:  2019  2:17 AM  Oxytocin given at the time of delivery:  after delivery of placenta     Vaginal Counts     Initial count performed by 2 " team members:   Two Team Members   MEGAN Watkins       Needles Suture [place] Sponges Instruments   Initial counts 2  5    Added to count 1      Final counts 3  5    Placed during labor Accounted for at the end of labor   No    No    No     Final count performed by 2 team members:   Two Team Members   Dr. June Hernandez RN      Final count correct?:  Yes     Apgars    Living status:  Living   1 Minute 5 Minute 10 Minute 15 Minute 20 Minute   Skin color: 0  1       Heart rate: 2  2       Reflex irritability: 2  2       Muscle tone: 2  2       Respiratory effort: 2  2       Total: 8  9       Apgars assigned by:  AUDIE NARVAEZ RN     Cord    Vessels:  3 Vessels Complications:  None   Cord Blood Disposition:  Lab Gases Sent?:  No       Resuscitation    Methods:  None  Output in Delivery Room:  Stool     Skin to Skin and Feeding Plan    Skin to skin initiation date/time: 1841    Skin to skin with:  Mother  Skin to skin end date/time:     How do you plan to feed your baby:  Breastfeeding     Labor Events and Shoulder Dystocia    Fetal Tracing Prior to Delivery:  Category 2  Fetal Tracing Comments:  Cat 1 throughout most of labor course then intermittent variable and late decelerations with pushing  Shoulder dystocia present?:  Neg     Delivery (Maternal) (Provider to Complete) ([id number])    Episiotomy:  None  Perineal lacerations:  2nd Repaired?:  Yes   Vaginal laceration?:  No    Cervical laceration?:  No       Blood Loss  Mother: SalinasNoeTeresa #[id number]   Start of Mother's Information    IO Blood Loss  09/15/19 1100 - 19 0242    Delivery QBL (mL) Hospital Encounter 300 mL    Total  300 mL         End of Mother's Information  Mother: Salinas, Teresa #[id number]         Delivery - Provider to Complete ([id number])    Delivering clinician:  Uma Watkins MD  Attempted Delivery Types (Choose all that apply):  Spontaneous Vaginal Delivery  Delivery Type (Choose the 1 that will  go to the Birth History):  Vaginal, Spontaneous   Other personnel:   Provider Role   Uma Watkins MD Obstetrician   Kacie Serrano, RN Delivery Nurse         Placenta    Delayed Cord Clamping:  Done  Date/Time:  9/16/2019  2:17 AM  Removal:  Spontaneous  Comments:  3V cord, intact placenta  Disposition:  Hospital disposal     Anesthesia    Method:  Epidural  Cervical dilation at placement:  8-10          Presentation and Position    Presentation:  Vertex  Position:  Middle Occiput Anterior           Uma Watkins MD

## 2019-09-16 NOTE — PLAN OF CARE
Infant +2 station and patient feeling pressure. Attempted to begin pushing but infant did not tolerate and had prolonged deceleration with FHT's to the 80's. Resolved with interventions including O2 via simple face mask, IVF bolus, repositioning patient and SVE with scalp stimulation. Will continue to labor down and delay pushing for now.

## 2019-09-16 NOTE — PLAN OF CARE
Patient up independently in her room. Pain managed with PRN ibuprofen. Breastfeeding independently. Showered today. Supportive  at bedside. VS stable, afebrile.

## 2019-09-16 NOTE — ANESTHESIA POSTPROCEDURE EVALUATION
Patient: Teresa Salinas    * No procedures listed *    Diagnosis:* No pre-op diagnosis entered *  Diagnosis Additional Information: No value filed.    Anesthesia Type:  Epidural    Note:  Anesthesia Post Evaluation    Patient location during evaluation: Floor  Patient participation: Able to fully participate in evaluation  Level of consciousness: awake and alert  Pain management: adequate  Airway patency: patent  Cardiovascular status: acceptable and hemodynamically stable  Respiratory status: acceptable and room air  Hydration status: acceptable  PONV: none     Anesthetic complications: None          Last vitals:  Vitals:    09/16/19 0355 09/16/19 0410 09/16/19 0425   BP: 103/58 99/56 103/59   Resp:      Temp:      SpO2:            Electronically Signed By: BETTINA Montejo CRNA  September 16, 2019  6:45 AM

## 2019-09-16 NOTE — ANESTHESIA PROCEDURE NOTES
Peripheral nerve/Neuraxial procedure note : epidural catheter  Pre-Procedure  Performed by  Emelina Ulloa APRN CRNA   Location: OB      Pre-Anesthestic Checklist: patient identified, IV checked, risks and benefits discussed, informed consent, monitors and equipment checked and pre-op evaluation    Timeout  Correct Patient: Yes   Correct Procedure: Yes   Correct Site: Yes   Correct Laterality: N/A   Correct Position: Yes   Site Marked: N/A   .   Procedure Documentation    .    Procedure:    Epidural catheter.  Insertion Site:L3-4  (midline approach) Injection technique: LORT saline and LORT air   Local skin infiltrated with 10 mL of 1% lidocaine.  BRUNO at 6 cm     Patient Prep;mask, sterile gloves, chlorhexidine gluconate and isopropyl alcohol, patient draped.  .  Needle: Touhy needle Needle Gauge: 17.    Needle Length (Inches) 3.5  # of attempts: 1 and # of redirects:  .   Catheter: 19 G . .  Catheter threaded easily  4 cm epidural space.  10 cm at skin.   .    Assessment/Narrative  .  .  Aspiration negative for heme or CSF  . Test dose of 4 mL lidocaine 1.5% w/ 1:200,000 epinephrine at 19:15.  Test dose negative for signs of intravascular, subdural or intrathecal injection. Comments:  Pain prior to epidural: 8/10  Pain after epidural: 0/10 Pt states she is unable to feel contractions

## 2019-09-16 NOTE — PLAN OF CARE
S:Delivery  B:Spontaneous Labor,40w0d    GBS+ with antibiotic treatment greater than or equal to 4 hours prior to delivery.  A: Patient delivered   lac 2nd degree at 0213 with Dr. KEO Watkins in attendance and baby placed on mother's abdomen for delayed cord clamping. Baby dried and stimulated. Baby placed  skin to skin @ 213.. Apgars 8/9.  IV infusion of Oxytocin  infused. Placenta removal spontaneous. MD does not want placenta sent to pathology.  See Flowsheet for VS and PP checks. Delivery QBL (mL): 300 mL.  Labor care plan goals met, transition now to postpartum care.  R: Expect routine postpartum care. Anticipate first feeding within the hour or whenever infant displays feeding cues. Continue skin to skin. Prior discussion with mother indicates that feeding plan is Breast feeding . Educated mother on importance of exclusive breastfeeding, expected feeding readiness cues and encouraged her to observe for these cues while rooming in. Informed her that breastfeeding assistance would be provided.

## 2019-09-16 NOTE — PLAN OF CARE
Mother and baby transferred to postpartum unit at 0500 via janusz miranda after completion of immediate recovery period. Patient oriented to room . Mother and baby bonding well and in stable condition upon transfer.

## 2019-09-16 NOTE — PROGRESS NOTES
MD to the bedside due to prolonged fetal deceleration.   Recent epidural dosing, treated with epinephrine x3.  FHT: 4 min prolonged decel to the 60s with return to baseline with epinephrine treatment  Crx: 8/90/-1, YINKA Watkins MD

## 2019-09-17 LAB — HGB BLD-MCNC: 10.8 G/DL (ref 11.7–15.7)

## 2019-09-17 PROCEDURE — 25000132 ZZH RX MED GY IP 250 OP 250 PS 637: Performed by: OBSTETRICS & GYNECOLOGY

## 2019-09-17 PROCEDURE — 85018 HEMOGLOBIN: CPT | Performed by: OBSTETRICS & GYNECOLOGY

## 2019-09-17 PROCEDURE — 12000000 ZZH R&B MED SURG/OB

## 2019-09-17 PROCEDURE — 36415 COLL VENOUS BLD VENIPUNCTURE: CPT | Performed by: OBSTETRICS & GYNECOLOGY

## 2019-09-17 RX ADMIN — ACETAMINOPHEN 650 MG: 325 TABLET, FILM COATED ORAL at 16:14

## 2019-09-17 RX ADMIN — SENNOSIDES AND DOCUSATE SODIUM 1 TABLET: 8.6; 5 TABLET ORAL at 08:19

## 2019-09-17 RX ADMIN — IBUPROFEN 800 MG: 800 TABLET ORAL at 05:05

## 2019-09-17 RX ADMIN — IBUPROFEN 800 MG: 800 TABLET ORAL at 13:45

## 2019-09-17 RX ADMIN — SENNOSIDES AND DOCUSATE SODIUM 1 TABLET: 8.6; 5 TABLET ORAL at 19:54

## 2019-09-17 RX ADMIN — CETIRIZINE HYDROCHLORIDE 10 MG: 10 TABLET, FILM COATED ORAL at 08:19

## 2019-09-17 RX ADMIN — IBUPROFEN 800 MG: 800 TABLET ORAL at 19:54

## 2019-09-17 RX ADMIN — ACETAMINOPHEN 650 MG: 325 TABLET, FILM COATED ORAL at 10:00

## 2019-09-17 RX ADMIN — ACETAMINOPHEN 650 MG: 325 TABLET, FILM COATED ORAL at 01:09

## 2019-09-17 NOTE — PLAN OF CARE
Patient progressing well.  Ambulating, Eating and voiding well. Independent with mother/baby cares. Bonding well with infant.  Breast feeding is going well, baby is latching well and feeding for good lengths of time. Patient rates perineum Pain Comfortably manageable.  Taking Ibuprofen and Tylenol when due with good relief.  Made plan with patient to bring pain medication when patient requests it. Patient encouraged to report increased bleeding or clots and to void frequently.  Father of infant went home for the night.

## 2019-09-17 NOTE — PLAN OF CARE
Patient progressing along PP pathway as expected, VSS, LS clear.  BF going very well with mild nipple discomfort.  FF @ U/2, lochia light rubra, no edema, voiding adequately and appetite good.  Bonding well with baby.  Perineal discomfort 2/2 2nd degree lac managed adequately with Tylenol and ibuprofen.

## 2019-09-17 NOTE — PROGRESS NOTES
[facility] OB/[facility] Daily Postpartum Note    S: Ms. Salinas is feeling well this morning. She denies any complaints. Her pain is well-controlled on oral pain medications. She tolerating a regular diet without nausea or vomiting. Ambulating without difficulty. Lochia is decreasing. Breastfeeding without questions or concerns. She plans to use Nexplanon for contraception.      O:   VS:   Patient Vitals for the past 24 hrs:   BP Temp Temp src Pulse Heart Rate Resp SpO2   19 1345 -- -- -- -- -- 16 --   19 1045 -- -- -- -- -- 17 --   19 1000 -- -- -- -- -- 16 --   19 0820 104/67 97.5  F (36.4  C) Oral 81 -- 16 98 %   19 0155 -- -- -- -- -- 18 --   19 0100 104/63 97.4  F (36.3  C) Oral 72 72 18 97 %   19 2234 105/59 -- -- 72 -- 18 97 %     General: resting in bed, in NAD  CV: Regular rate, warm and well perfused  Resp: breathing comfortably on room air   Abdomen: soft, appropriately tender, nondistended  Fundus firm below the umbilicus  Extremities: non-tender, non-edematous     Recent Labs   Lab 19  0647   HGB 10.8*       A: Ms. Salinas is a 34 year old now P1, PPD #1 s/p . Pregnancy and delivery uncomplicated.     P:  Continue routine pp cares  GI: tolerating regular diet  Feeding: breast  Contraception: Nexplanon  Rubella Immune, GBS neg  Rh neg: rhogam eval pending   Disposition: routine PP cares, anticipate d/c tomorrow, once discharge goals are obtained, likely PPD # 2    Uma Watkins MD, MD  [facility]   2019 3:54 PM

## 2019-09-17 NOTE — PLAN OF CARE
Vital signs within normal limits. Postpartum check within normal limits - see flow record. Patient eating and drinking normally. Patient able to empty bladder independently. . Patient ambulating independently..   No apparent signs of infection. Lac 2nd degree healing well. Patient is performing self cares and is able to care for infant. Positive attachment behaviors are observed with infant. Pain plan was discussed. Patient will request pain med when she is ready for it. Patient declined pain medications at this time. Patient education done about breastfeeding,  cares, postpartum cares and pain management/plan. See flow record.  Anticipate discharge on 19.

## 2019-09-18 VITALS
DIASTOLIC BLOOD PRESSURE: 68 MMHG | HEART RATE: 82 BPM | RESPIRATION RATE: 16 BRPM | OXYGEN SATURATION: 98 % | SYSTOLIC BLOOD PRESSURE: 116 MMHG | TEMPERATURE: 98.4 F

## 2019-09-18 PROCEDURE — 25000132 ZZH RX MED GY IP 250 OP 250 PS 637: Performed by: OBSTETRICS & GYNECOLOGY

## 2019-09-18 RX ADMIN — SENNOSIDES AND DOCUSATE SODIUM 1 TABLET: 8.6; 5 TABLET ORAL at 08:38

## 2019-09-18 RX ADMIN — CETIRIZINE HYDROCHLORIDE 10 MG: 10 TABLET, FILM COATED ORAL at 08:38

## 2019-09-18 RX ADMIN — IBUPROFEN 800 MG: 800 TABLET ORAL at 00:18

## 2019-09-18 NOTE — PLAN OF CARE
Patient has no concerns regarding postpartum self care and care of .  Given breast pump with demonstration of set up.  Patient currently experiencing sore nipples.  Larger size flange given for pumping.

## 2019-09-18 NOTE — PLAN OF CARE
Data: Vital signs within normal limits. Postpartum checks within normal limits - see flow record. Patient eating and drinking normally. Patient able to empty bladder independently. . Patient ambulating independently..   No apparent signs of infection. Lac 2nd degree healing well. Patient Is performing self cares and Is able to care for infant. Positive attachment behaviors are observed with infant. Support persons are present.  Action:  Pain plan was discussed. Patient would like pain meds to be brought in when they are due. Patient was medicated during the shift for pain. See MAR.Patient education done about breastfeeding,  cares, postpartum cares, pain management/plan and discharge from hospital. See flow record.  Response:   Patient reassessed within 1 hour after each medication for pain. Patient stated that pain had improved. Patient stated that she was comfortable. .   Plan: Anticipate discharge on 19.

## 2019-09-18 NOTE — PROGRESS NOTES
Pt is afebrile & VS are stable.  Pt is independent with self cares.  Breast Pump purchase agreement was signed and breast pump was given to pt.

## 2019-09-18 NOTE — PROGRESS NOTES
[facility] Obstetrics Post-Partum Progress Note          Assessment and Plan:    Assessment:   Post-partum day #2  Normal spontaneous vaginal delivery  L&D complications: None            Plan:   Discharge home           Interval History:   Doing well.  Pain is well-controlled.  No fevers.  No history of foul-smelling vaginal discharge.  Good appetite.  Denies chest pain, shortness of breath, nausea or vomiting.  Vaginal bleeding is similar to a heavy menstrual flow.  Ambulatory.  Breastfeeding well.          Significant Problems:    Active Problems:    Supervision of normal first pregnancy    Normal labor and delivery            Review of Systems:    The patient denies any chest pain, shortness of breath, excessive pain, fever, chills, purulent drainage from the wound, nausea or vomiting.          Medications:   All medications related to the patient's surgery have been reviewed          Physical Exam:   All vitals stable  No data found.    Uterine fundus is firm, non-tender and at the level of the umbilicus          Data:     All laboratory data related to this surgery reviewed  Hemoglobin   Date Value Ref Range Status   09/17/2019 10.8 (L) 11.7 - 15.7 g/dL Final   05/31/2019 12.0 11.7 - 15.7 g/dL Final   02/08/2019 14.1 11.7 - 15.7 g/dL Final     No imaging studies have been ordered    Magnolia Herrera MD

## 2019-09-18 NOTE — DISCHARGE INSTRUCTIONS
Discharge Instructions and Follow-Up:   Discharge diet: Regular   Discharge activity: Activity as tolerated   Discharge follow-up: Follow up with OB in 6 weeks   Wound care: Drink plenty of fluids      Postpartum Vaginal Delivery Instructions    Activity       Ask family and friends for help when you need it.    Do not place anything in your vagina for 6 weeks.    You are not restricted on other activities, but take it easy for a few weeks to allow your body to recover from delivery.  You are able to do any activities you feel up to that point.    No driving until you have stopped taking your pain medications (usually two weeks after delivery).     Call your health care provider if you have any of these symptoms:       Increased pain, swelling, redness, or fluid around your stiches from an episiotomy or perineal tear.    A fever above 100.4 F (38 C) with or without chills when placing a thermometer under your tongue.    You soak a sanitary pad with blood within 1 hour, or you see blood clots larger than a golf ball.    Bleeding that lasts more than 6 weeks.    Vaginal discharge that smells bad.    Severe pain, cramping or tenderness in your lower belly area.    A need to urinate more frequently (use the toilet more often), more urgently (use the toilet very quickly), or it burns when you urinate.    Nausea and vomiting.    Redness, swelling or pain around a vein in your leg.    Problems breastfeeding or a red or painful area on your breast.    Chest pain and cough or are gasping for air.    Problems coping with sadness, anxiety, or depression.  If you have any concerns about hurting yourself or the baby, call your provider immediately.     You have questions or concerns after you return home.     Keep your hands clean:  Always wash your hands before touching your perineal area and stitches.  This helps reduce your risk of infection.  If your hands aren't dirty, you may use an alcohol hand-rub to clean your  hands. Keep your nails clean and short.

## 2019-09-18 NOTE — PROGRESS NOTES
Patient discharged per ambulatory with infant in car seat. Mother verified that her band matches her infant's band by comparing the infant's    Discharge instructions given. Encouraged to call for any problems, questions or concerns. .

## 2019-09-18 NOTE — DISCHARGE SUMMARY
[facility] Discharge Summary    Teresa Salinas MRN# [id number]   Age: 34 year old YOB: 1985     Date of Admission:  9/15/2019  Date of Discharge::  9/18/2019  Admitting Physician:  Uma Watkins MD  Discharge Physician:  Magnolia Herrera MD     Home clinic: [facility]          Admission Diagnoses:   PREGNANCY  Supervision of normal first pregnancy  Normal labor and delivery          Discharge Diagnosis:   Normal spontaneous vaginal delivery  Intrauterine pregnancy at 40 weeks gestation          Procedures:   Procedure(s): No additional procedures performed       No other procedures performed during this admission           Medications Prior to Admission:     Medications Prior to Admission   Medication Sig Dispense Refill Last Dose     cetirizine (ZYRTEC) 10 MG tablet Take 10 mg by mouth daily  30 tablet 1 9/15/2019 at am     Cholecalciferol (VITAMIN D) 2000 UNITS tablet Take 2,000 Units by mouth daily 100 tablet 3 9/15/2019 at am     cromolyn (OPTICROM) 4 % ophthalmic solution Place 1 drop into both eyes 4 times daily as needed (Itchy/watery eyes) 10 mL 3 9/15/2019 at am     Prenatal Vit-Fe Fumarate-FA (PRENATAL MULTIVITAMIN W/IRON) 27-0.8 MG tablet Take 1 tablet by mouth daily   9/15/2019 at am     ranitidine (ZANTAC) 150 MG tablet Take 150 mg by mouth 2 times daily   9/15/2019 at 1200     albuterol (PROAIR HFA/PROVENTIL HFA/VENTOLIN HFA) 108 (90 Base) MCG/ACT Inhaler Inhale 2 puffs into the lungs every 4 hours as needed for shortness of breath / dyspnea or wheezing (Patient not taking: Reported on 9/11/2019) 1 Inhaler 6 More than a month at Unknown time     EPINEPHrine (AUVI-Q) 0.3 MG/0.3ML injection 2-pack Inject 0.3 mLs (0.3 mg) into the muscle as needed for anaphylaxis (Patient not taking: Reported on 9/11/2019) 0.6 mL 2 never used     ORDER FOR ALLERGEN IMMUNOTHERAPY Name of Mix: Mix #1  Dog, Grass  Dog Hair-Dander, A. P.  1:100 w/v, HS  1.0 ml   Vasquez  (Std) 100,000 BAU/mL, HS 0.3 ml  Diluent: HSA qs to 5ml (Patient not taking: Reported on 2019) 5 mL PRN More than a month at Unknown time             Discharge Medications:     Current Discharge Medication List      CONTINUE these medications which have NOT CHANGED    Details   cetirizine (ZYRTEC) 10 MG tablet Take 10 mg by mouth daily   Qty: 30 tablet, Refills: 1      Cholecalciferol (VITAMIN D) 2000 UNITS tablet Take 2,000 Units by mouth daily  Qty: 100 tablet, Refills: 3      cromolyn (OPTICROM) 4 % ophthalmic solution Place 1 drop into both eyes 4 times daily as needed (Itchy/watery eyes)  Qty: 10 mL, Refills: 3    Associated Diagnoses: Allergic conjunctivitis, bilateral      Prenatal Vit-Fe Fumarate-FA (PRENATAL MULTIVITAMIN W/IRON) 27-0.8 MG tablet Take 1 tablet by mouth daily      ranitidine (ZANTAC) 150 MG tablet Take 150 mg by mouth 2 times daily      albuterol (PROAIR HFA/PROVENTIL HFA/VENTOLIN HFA) 108 (90 Base) MCG/ACT Inhaler Inhale 2 puffs into the lungs every 4 hours as needed for shortness of breath / dyspnea or wheezing  Qty: 1 Inhaler, Refills: 6    Comments: Patient will call for fill.  Associated Diagnoses: Other allergic rhinitis      EPINEPHrine (AUVI-Q) 0.3 MG/0.3ML injection 2-pack Inject 0.3 mLs (0.3 mg) into the muscle as needed for anaphylaxis  Qty: 0.6 mL, Refills: 2    Comments: Two devices with two refills.  Associated Diagnoses: Seasonal allergic rhinitis due to pollen; Allergic rhinitis due to animals      ORDER FOR ALLERGEN IMMUNOTHERAPY Name of Mix: Mix #1  Dog, Grass  Dog Hair-Dander, A. P.  1:100 w/v, HS  1.0 ml   Vasquez (Std) 100,000 BAU/mL, HS 0.3 ml  Diluent: HSA qs to 5ml  Qty: 5 mL, Refills: PRN    Associated Diagnoses: Seasonal allergic rhinitis due to pollen; Allergic rhinitis due to dust mite                   Consultations:   No consultations were requested during this admission          Brief History of Labor:        Ms. Salinas is a 35 yo  at 40w0d by LMP c/w  "8w3d US who presents in spontaneous active labor. Pregnancy complicated by Rh negative status and GBS bacteruria so PCN was administered. An epidural was placed for pain management. She progressed to complete dilation, pushed to a slow crown and delivered a vigorous male infant vertex, direct OA via . APGARs 8 and 9. The placenta delivered via gentle cord traction and was noted to be intact with a 3V cord. She had a 2nd degree perineal laceration that was repaired with 3-0 vicryl. Fundus was firm with fundal massage and IV pitocin. QBL 300cc. Mom and baby were stable for transport to postpartum.   \"Adan\"        Hospital Course:   The patient's hospital course was unremarkable.  On discharge, her pain was well controlled. Vaginal bleeding is similar to peak menstrual flow.  Voiding without difficulty.  Ambulating well and tolerating a normal diet.  No fever.  Breastfeeding well.  Infant is stable.  No bowel movement yet.*  She was discharged on post-partum day #2.    Post-partum hemoglobin:   Hemoglobin   Date Value Ref Range Status   2019 10.8 (L) 11.7 - 15.7 g/dL Final             Discharge Instructions and Follow-Up:   Discharge diet: Regular   Discharge activity: Activity as tolerated   Discharge follow-up: Follow up with OB in 6 weeks   Wound care: Drink plenty of fluids           Discharge Disposition:   Discharged to home      Attestation:  I have reviewed today's vital signs, notes, medications, labs and imaging.    Magnolia Herrera MD     "

## 2019-09-26 ENCOUNTER — OFFICE VISIT (OUTPATIENT)
Dept: FAMILY MEDICINE | Facility: CLINIC | Age: 34
End: 2019-09-26
Payer: COMMERCIAL

## 2019-09-26 VITALS
WEIGHT: 166.6 LBS | SYSTOLIC BLOOD PRESSURE: 108 MMHG | HEART RATE: 80 BPM | TEMPERATURE: 98.1 F | DIASTOLIC BLOOD PRESSURE: 72 MMHG | BODY MASS INDEX: 27.72 KG/M2 | RESPIRATION RATE: 16 BRPM | OXYGEN SATURATION: 98 %

## 2019-09-26 DIAGNOSIS — R30.9 PAINFUL URINATION: ICD-10-CM

## 2019-09-26 DIAGNOSIS — R82.90 NONSPECIFIC FINDING ON EXAMINATION OF URINE: ICD-10-CM

## 2019-09-26 DIAGNOSIS — N30.01 ACUTE CYSTITIS WITH HEMATURIA: Primary | ICD-10-CM

## 2019-09-26 LAB
ALBUMIN UR-MCNC: 30 MG/DL
APPEARANCE UR: ABNORMAL
BACTERIA #/AREA URNS HPF: ABNORMAL /HPF
BILIRUB UR QL STRIP: NEGATIVE
COLOR UR AUTO: ABNORMAL
GLUCOSE UR STRIP-MCNC: NEGATIVE MG/DL
HGB UR QL STRIP: ABNORMAL
KETONES UR STRIP-MCNC: NEGATIVE MG/DL
LEUKOCYTE ESTERASE UR QL STRIP: ABNORMAL
NITRATE UR QL: NEGATIVE
PH UR STRIP: 6.5 PH (ref 5–7)
RBC #/AREA URNS AUTO: ABNORMAL /HPF
SOURCE: ABNORMAL
SP GR UR STRIP: 1.01 (ref 1–1.03)
UROBILINOGEN UR STRIP-ACNC: 0.2 EU/DL (ref 0.2–1)
WBC #/AREA URNS AUTO: ABNORMAL /HPF

## 2019-09-26 PROCEDURE — 99213 OFFICE O/P EST LOW 20 MIN: CPT | Performed by: FAMILY MEDICINE

## 2019-09-26 PROCEDURE — 87086 URINE CULTURE/COLONY COUNT: CPT | Performed by: FAMILY MEDICINE

## 2019-09-26 PROCEDURE — 81001 URINALYSIS AUTO W/SCOPE: CPT | Performed by: FAMILY MEDICINE

## 2019-09-26 RX ORDER — CEPHALEXIN 500 MG/1
500 CAPSULE ORAL 2 TIMES DAILY
Qty: 14 CAPSULE | Refills: 0 | Status: SHIPPED | OUTPATIENT
Start: 2019-09-26 | End: 2019-10-31

## 2019-09-26 NOTE — PROGRESS NOTES
Subjective     Teresa Salinas is a 34 year old female who presents to clinic today for the following health issues:    HPI   URINARY TRACT SYMPTOMS  Onset: x 3 days    Description:   Painful urination (Dysuria): YES           Frequency: YES  Blood in urine (Hematuria): hard to say as still bleeding since she gave birth.  Delay in urine (Hesitency): no     Intensity: mild    Progression of Symptoms:  worsening    Accompanying Signs & Symptoms:  Fever/chills: no   Flank pain YES- Left side  Nausea and vomiting: no   Any vaginal symptoms: none  Abdominal/Pelvic Pain: YES- low pressure    History:   History of frequent UTI's: no   History of kidney stones: no   Sexually Active: not recently  Possibility of pregnancy: No    Precipitating factors:   nothing    Therapies Tried and outcome: nothing    Patient had  2019. During that time, patient had a robertson cath in.    Patient Active Problem List   Diagnosis     Allergic rhinitis due to dust mite     Mild intermittent asthma     Family history of thyroid disease     Allergic rhinitis due to pollen     Rh negative state in antepartum period     GBS bacteriuria     Encounter for supervision of low-risk first pregnancy, antepartum     Supervision of normal first pregnancy     Normal labor and delivery     Past Surgical History:   Procedure Laterality Date     Arthoscopic right ankle Right      OSTEOTOMY ANKLE Right        Social History     Tobacco Use     Smoking status: Never Smoker     Smokeless tobacco: Never Used   Substance Use Topics     Alcohol use: No     Alcohol/week: 0.0 standard drinks     Family History   Problem Relation Age of Onset     Diabetes Father         typeII     Hypertension Father      Hyperlipidemia Father      Depression Father      Seasonal/Environmental Allergies Father      Hyperlipidemia Maternal Grandmother      Hyperlipidemia Mother      Thyroid Disease Mother      Seasonal/Environmental Allergies Mother      Colon Cancer  Maternal Grandfather      Asthma Sister      Seasonal/Environmental Allergies Sister      Other - See Comments Sister         clotting disorder     Thyroid Disease Sister      Chronic Obstructive Pulmonary Disease Paternal Grandmother      Esophageal Cancer Paternal Grandfather          Current Outpatient Medications   Medication Sig Dispense Refill     albuterol (PROAIR HFA/PROVENTIL HFA/VENTOLIN HFA) 108 (90 Base) MCG/ACT Inhaler Inhale 2 puffs into the lungs every 4 hours as needed for shortness of breath / dyspnea or wheezing 1 Inhaler 6     cephALEXin (KEFLEX) 500 MG capsule Take 1 capsule (500 mg) by mouth 2 times daily for 7 days 14 capsule 0     cetirizine (ZYRTEC) 10 MG tablet Take 10 mg by mouth daily  30 tablet 1     Cholecalciferol (VITAMIN D) 2000 UNITS tablet Take 2,000 Units by mouth daily 100 tablet 3     cromolyn (OPTICROM) 4 % ophthalmic solution Place 1 drop into both eyes 4 times daily as needed (Itchy/watery eyes) 10 mL 3     Prenatal Vit-Fe Fumarate-FA (PRENATAL MULTIVITAMIN W/IRON) 27-0.8 MG tablet Take 1 tablet by mouth daily       EPINEPHrine (AUVI-Q) 0.3 MG/0.3ML injection 2-pack Inject 0.3 mLs (0.3 mg) into the muscle as needed for anaphylaxis (Patient not taking: Reported on 9/26/2019) 0.6 mL 2     ORDER FOR ALLERGEN IMMUNOTHERAPY Name of Mix: Mix #1  Dog, Grass  Dog Hair-Dander, A. P.  1:100 w/v, HS  1.0 ml   Vasquez (Std) 100,000 BAU/mL, HS 0.3 ml  Diluent: HSA qs to 5ml (Patient not taking: Reported on 9/26/2019) 5 mL PRN     Allergies   Allergen Reactions     Contrast Dye Shortness Of Breath     Clindamycin Hives     Diagnostic X-Ray Materials Itching     Itching, sneezing.       Reviewed and updated as needed this visit by Provider  Tobacco  Allergies  Meds  Problems  Med Hx  Surg Hx  Fam Hx         Review of Systems   C: NEGATIVE for fever, chills, change in weight  I: NEGATIVE for worrisome rashes, moles or lesions  R: NEGATIVE for significant cough or SOB  CV: NEGATIVE for  chest pain, palpitations or peripheral edema  GI: NEGATIVE for nausea, abdominal pain, heartburn, or change in bowel habits  :see above  H: NEGATIVE for bleeding problems      Objective    /72   Pulse 80   Temp 98.1  F (36.7  C) (Tympanic)   Resp 16   Wt 75.6 kg (166 lb 9.6 oz)   LMP 12/10/2018   SpO2 98%   BMI 27.72 kg/m    Body mass index is 27.72 kg/m .  Physical Exam   GENERAL: alert and no distress, ambulatory w/o assist  SKIN: no suspicious lesions, no rashes  BACK: no CVA tenderness bilaterally  ABD:  Flat, soft, mild suprapubic and bilateral lower quadrant tenderness, no guarding, no mass palpable on exam    Diagnostic Test Results:  Results for orders placed or performed in visit on 09/26/19 (from the past 24 hour(s))   *UA reflex to Microscopic and Culture ([place] and [facility] (except [place] and [place])   Result Value Ref Range    Color Urine Pink     Appearance Urine Cloudy     Glucose Urine Negative NEG^Negative mg/dL    Bilirubin Urine Negative NEG^Negative    Ketones Urine Negative NEG^Negative mg/dL    Specific Gravity Urine 1.015 1.003 - 1.035    Blood Urine Large (A) NEG^Negative    pH Urine 6.5 5.0 - 7.0 pH    Protein Albumin Urine 30 (A) NEG^Negative mg/dL    Urobilinogen Urine 0.2 0.2 - 1.0 EU/dL    Nitrite Urine Negative NEG^Negative    Leukocyte Esterase Urine Moderate (A) NEG^Negative    Source Midstream Urine    Urine Microscopic   Result Value Ref Range    WBC Urine 10-25 (A) OTO5^0 - 5 /HPF    RBC Urine 25-50 (A) OTO2^O - 2 /HPF    Bacteria Urine Many (A) NEG^Negative /HPF           Assessment & Plan     Teresa was seen today for uti.    Diagnoses and all orders for this visit:    Acute cystitis with hematuria  -     cephALEXin (KEFLEX) 500 MG capsule; Take 1 capsule (500 mg) by mouth 2 times daily for 7 days    Painful urination  -     *UA reflex to Microscopic and Culture ([place] and [facility] (except [place] and [place])  -     Urine  "Microscopic    Nonspecific finding on examination of urine  -     Urine Culture Aerobic Bacterial      Discussed results of test.  No acute/urgent signs today.  Start empiric antibiotics and adjust depending on culture results. Chose cephalosporin as patient is breastfeeding. Advised to push fluids as tolerated; cranberry juice as tolerated. Advised if with fever, flank pain or new urinary symptoms, see doctor again.         Patient Instructions     Patient Education     Bladder Infection, Female (Adult)    Urine is normally doesn't have any bacteria in it. But bacteria can get into the urinary tract from the skin around the rectum. Or they can travel in the blood from elsewhere in the body. Once they are in your urinary tract, they can cause infection in the urethra (urethritis), the bladder (cystitis), or the kidneys (pyelonephritis).  The most common place for an infection is in the bladder. This is called a bladder infection. This is one of the most common infections in women. Most bladder infections are easily treated. They are not serious unless the infection spreads to the kidney.  The phrases \"bladder infection,\" \"UTI,\" and \"cystitis\" are often used to describe the same thing. But they are not always the same. Cystitis is an inflammation of the bladder. The most common cause of cystitis is an infection.  Symptoms  The infection causes inflammation in the urethra and bladder. This causes many of the symptoms. The most common symptoms of a bladder infection are:    Pain or burning when urinating    Having to urinate more often than usual    Urgent need to urinate    Only a small amount of urine comes out    Blood in urine    Abdominal discomfort. This is usually in the lower abdomen above the pubic bone.    Cloudy urine    Strong- or bad-smelling urine    Unable to urinate (urinary retention)    Unable to hold urine in (urinary incontinence)    Fever    Loss of appetite    Confusion (in older " adults)  Causes  Bladder infections are not contagious. You can't get one from someone else, from a toilet seat, or from sharing a bath.  The most common cause of bladder infections is bacteria from the bowels. The bacteria get onto the skin around the opening of the urethra. From there, they can get into the urine and travel up to the bladder, causing inflammation and infection. This usually happens because of:    Wiping improperly after urinating. Always wipe from front to back.    Bowel incontinence    Pregnancy    Procedures such as having a catheter inserted    Older age    Not emptying your bladder. This can allow bacteria a chance to grow in your urine.    Dehydration    Constipation    Sex    Use of a diaphragm for birth control   Treatment  Bladder infections are diagnosed by a urine test. They are treated with antibiotics and usually clear up quickly without complications. Treatment helps prevent a more serious kidney infection.  Medicines  Medicines can help in the treatment of a bladder infection:    Take antibiotics until they are used up, even if you feel better. It is important to finish them to make sure the infection has cleared.    You can use acetaminophen or ibuprofen for pain, fever, or discomfort, unless another medicine was prescribed. If you have chronic liver or kidney disease, talk with your healthcare provider before using these medicines. Also talk with your provider if you've ever had a stomach ulcer or gastrointestinal bleeding, or are taking blood-thinner medicines.    If you are given phenazopydridine to reduce burning with urination, it will cause your urine to become a bright orange color. This can stain clothing.  Care and prevention  These self-care steps can help prevent future infections:    Drink plenty of fluids to prevent dehydration and flush out your bladder. Do this unless you must restrict fluids for other health reasons, or your doctor told you not to.    Proper cleaning  after going to the bathroom is important. Wipe from front to back after using the toilet to prevent the spread of bacteria.    Urinate more often. Don't try to hold urine in for a long time.    Wear loose-fitting clothes and cotton underwear. Avoid tight-fitting pants.    Improve your diet and prevent constipation. Eat more fresh fruit and vegetables, and fiber, and less junk and fatty foods.    Avoid sex until your symptoms are gone.    Avoid caffeine, alcohol, and spicy foods. These can irritate your bladder.    Urinate right after intercourse to flush out your bladder.    If you use birth control pills and have frequent bladder infections, discuss it with your doctor.  Follow-up care  Call your healthcare provider if all symptoms are not gone after 3 days of treatment. This is especially important if you have repeat infections.  If a culture was done, you will be told if your treatment needs to be changed. If directed, you can call to find out the results.  If X-rays were done, you will be told if the results will affect your treatment.  Call 911  Call 911 if any of the following occur:    Trouble breathing    Hard to wake up or confusion    Fainting or loss of consciousness    Rapid heart rate  When to seek medical advice  Call your healthcare provider right away if any of these occur:    Fever of 100.4 F (38.0 C) or higher, or as directed by your healthcare provider    Symptoms are not better by the third day of treatment    Back or belly (abdominal) pain that gets worse    Repeated vomiting, or unable to keep medicine down    Weakness or dizziness    Vaginal discharge    Pain, redness, or swelling in the outer vaginal area (labia)  Date Last Reviewed: 10/1/2016    9756-5147 [employer]. [address] [place] [address], [place] [address]. All rights reserved. This information is not intended as a substitute for professional medical care. Always follow your healthcare professional's instructions.                Return in about 1 week (around 10/3/2019) for If condition does not improve.    Gumaro Umaña MD  [facility]

## 2019-09-26 NOTE — PATIENT INSTRUCTIONS
"  Patient Education     Bladder Infection, Female (Adult)    Urine is normally doesn't have any bacteria in it. But bacteria can get into the urinary tract from the skin around the rectum. Or they can travel in the blood from elsewhere in the body. Once they are in your urinary tract, they can cause infection in the urethra (urethritis), the bladder (cystitis), or the kidneys (pyelonephritis).  The most common place for an infection is in the bladder. This is called a bladder infection. This is one of the most common infections in women. Most bladder infections are easily treated. They are not serious unless the infection spreads to the kidney.  The phrases \"bladder infection,\" \"UTI,\" and \"cystitis\" are often used to describe the same thing. But they are not always the same. Cystitis is an inflammation of the bladder. The most common cause of cystitis is an infection.  Symptoms  The infection causes inflammation in the urethra and bladder. This causes many of the symptoms. The most common symptoms of a bladder infection are:    Pain or burning when urinating    Having to urinate more often than usual    Urgent need to urinate    Only a small amount of urine comes out    Blood in urine    Abdominal discomfort. This is usually in the lower abdomen above the pubic bone.    Cloudy urine    Strong- or bad-smelling urine    Unable to urinate (urinary retention)    Unable to hold urine in (urinary incontinence)    Fever    Loss of appetite    Confusion (in older adults)  Causes  Bladder infections are not contagious. You can't get one from someone else, from a toilet seat, or from sharing a bath.  The most common cause of bladder infections is bacteria from the bowels. The bacteria get onto the skin around the opening of the urethra. From there, they can get into the urine and travel up to the bladder, causing inflammation and infection. This usually happens because of:    Wiping improperly after urinating. Always wipe " from front to back.    Bowel incontinence    Pregnancy    Procedures such as having a catheter inserted    Older age    Not emptying your bladder. This can allow bacteria a chance to grow in your urine.    Dehydration    Constipation    Sex    Use of a diaphragm for birth control   Treatment  Bladder infections are diagnosed by a urine test. They are treated with antibiotics and usually clear up quickly without complications. Treatment helps prevent a more serious kidney infection.  Medicines  Medicines can help in the treatment of a bladder infection:    Take antibiotics until they are used up, even if you feel better. It is important to finish them to make sure the infection has cleared.    You can use acetaminophen or ibuprofen for pain, fever, or discomfort, unless another medicine was prescribed. If you have chronic liver or kidney disease, talk with your healthcare provider before using these medicines. Also talk with your provider if you've ever had a stomach ulcer or gastrointestinal bleeding, or are taking blood-thinner medicines.    If you are given phenazopydridine to reduce burning with urination, it will cause your urine to become a bright orange color. This can stain clothing.  Care and prevention  These self-care steps can help prevent future infections:    Drink plenty of fluids to prevent dehydration and flush out your bladder. Do this unless you must restrict fluids for other health reasons, or your doctor told you not to.    Proper cleaning after going to the bathroom is important. Wipe from front to back after using the toilet to prevent the spread of bacteria.    Urinate more often. Don't try to hold urine in for a long time.    Wear loose-fitting clothes and cotton underwear. Avoid tight-fitting pants.    Improve your diet and prevent constipation. Eat more fresh fruit and vegetables, and fiber, and less junk and fatty foods.    Avoid sex until your symptoms are gone.    Avoid caffeine,  alcohol, and spicy foods. These can irritate your bladder.    Urinate right after intercourse to flush out your bladder.    If you use birth control pills and have frequent bladder infections, discuss it with your doctor.  Follow-up care  Call your healthcare provider if all symptoms are not gone after 3 days of treatment. This is especially important if you have repeat infections.  If a culture was done, you will be told if your treatment needs to be changed. If directed, you can call to find out the results.  If X-rays were done, you will be told if the results will affect your treatment.  Call 911  Call 911 if any of the following occur:    Trouble breathing    Hard to wake up or confusion    Fainting or loss of consciousness    Rapid heart rate  When to seek medical advice  Call your healthcare provider right away if any of these occur:    Fever of 100.4 F (38.0 C) or higher, or as directed by your healthcare provider    Symptoms are not better by the third day of treatment    Back or belly (abdominal) pain that gets worse    Repeated vomiting, or unable to keep medicine down    Weakness or dizziness    Vaginal discharge    Pain, redness, or swelling in the outer vaginal area (labia)  Date Last Reviewed: 10/1/2016    2335-5550 [employer]. [address] [place] [address], [place] [address]. All rights reserved. This information is not intended as a substitute for professional medical care. Always follow your healthcare professional's instructions.

## 2019-09-27 LAB
BACTERIA SPEC CULT: NORMAL
Lab: NORMAL
SPECIMEN SOURCE: NORMAL

## 2019-09-28 ENCOUNTER — HEALTH MAINTENANCE LETTER (OUTPATIENT)
Age: 34
End: 2019-09-28

## 2019-10-31 ENCOUNTER — PRENATAL OFFICE VISIT (OUTPATIENT)
Dept: OBGYN | Facility: CLINIC | Age: 34
End: 2019-10-31
Payer: COMMERCIAL

## 2019-10-31 VITALS
WEIGHT: 172.3 LBS | HEART RATE: 83 BPM | RESPIRATION RATE: 16 BRPM | HEIGHT: 65 IN | BODY MASS INDEX: 28.71 KG/M2 | TEMPERATURE: 98.4 F | SYSTOLIC BLOOD PRESSURE: 110 MMHG | DIASTOLIC BLOOD PRESSURE: 66 MMHG

## 2019-10-31 DIAGNOSIS — Z30.017 NEXPLANON INSERTION: ICD-10-CM

## 2019-10-31 DIAGNOSIS — O26.899 RH NEGATIVE STATE IN ANTEPARTUM PERIOD: ICD-10-CM

## 2019-10-31 DIAGNOSIS — R82.71 GBS BACTERIURIA: ICD-10-CM

## 2019-10-31 DIAGNOSIS — Z30.017 INSERTION OF IMPLANTABLE SUBDERMAL CONTRACEPTIVE: Primary | ICD-10-CM

## 2019-10-31 DIAGNOSIS — Z67.91 RH NEGATIVE STATE IN ANTEPARTUM PERIOD: ICD-10-CM

## 2019-10-31 PROBLEM — Z34.00 ENCOUNTER FOR SUPERVISION OF LOW-RISK FIRST PREGNANCY, ANTEPARTUM: Status: RESOLVED | Noted: 2019-04-05 | Resolved: 2019-10-31

## 2019-10-31 PROBLEM — Z34.00 SUPERVISION OF NORMAL FIRST PREGNANCY: Status: RESOLVED | Noted: 2019-09-15 | Resolved: 2019-10-31

## 2019-10-31 PROCEDURE — 11981 INSERTION DRUG DLVR IMPLANT: CPT | Performed by: OBSTETRICS & GYNECOLOGY

## 2019-10-31 PROCEDURE — 99207 ZZC POST PARTUM EXAM: CPT | Performed by: OBSTETRICS & GYNECOLOGY

## 2019-10-31 ASSESSMENT — MIFFLIN-ST. JEOR: SCORE: 1492.43

## 2019-10-31 ASSESSMENT — ANXIETY QUESTIONNAIRES
5. BEING SO RESTLESS THAT IT IS HARD TO SIT STILL: NOT AT ALL
1. FEELING NERVOUS, ANXIOUS, OR ON EDGE: NOT AT ALL
GAD7 TOTAL SCORE: 1
2. NOT BEING ABLE TO STOP OR CONTROL WORRYING: NOT AT ALL
3. WORRYING TOO MUCH ABOUT DIFFERENT THINGS: NOT AT ALL
IF YOU CHECKED OFF ANY PROBLEMS ON THIS QUESTIONNAIRE, HOW DIFFICULT HAVE THESE PROBLEMS MADE IT FOR YOU TO DO YOUR WORK, TAKE CARE OF THINGS AT HOME, OR GET ALONG WITH OTHER PEOPLE: NOT DIFFICULT AT ALL
7. FEELING AFRAID AS IF SOMETHING AWFUL MIGHT HAPPEN: NOT AT ALL
6. BECOMING EASILY ANNOYED OR IRRITABLE: SEVERAL DAYS

## 2019-10-31 ASSESSMENT — PATIENT HEALTH QUESTIONNAIRE - PHQ9
SUM OF ALL RESPONSES TO PHQ QUESTIONS 1-9: 3
5. POOR APPETITE OR OVEREATING: NOT AT ALL

## 2019-10-31 NOTE — PROGRESS NOTES
Nexplanon Procedure Note    Teresa Salinas  1985  [id number]    The patient was counseled on the risks benefits, and alternatives of the procedure - please see attached note for counseling. Verbal and written consent were obtained.  Patient has been absent since delivery of her baby.    Technique: The patient was placed in the dorsal position with the right arm elevated. An area of the arm was identified one hand's length proximal from the elbow. Local anesthesia was injected and the area was cleaned with betadine swabs x3. The Nexplanon was placed superficially in the subcutaneous tissue and a steri-strip was placed over the insertion site. A compression dressing was placed. The patient tolerated the procedure well. EBL: 1cc.  She was given post op instructions with appropriate precautions.     A/P: s/p Nexplanon for contraception.   Call clinic with any issues or return for removal and replacement in three years.     Uma Watkins MD  OB/GYN   10/31/19

## 2019-10-31 NOTE — PROGRESS NOTES
"[facility]    CC: Postpartum Visit    S: Pt doing well. She denies any complaints. Off any pain medications. Eating, drinking, urinating and moving bowels without any issues. Lochia has resolved. Breastfeeding without questions or concerns. She plans to use Nexplanon for contraception. Mood is good.  She is tearful at times but overall feels just fine.    O:   VS:   Patient Vitals for the past 24 hrs:   BP Temp Temp src Pulse Resp Height Weight   10/31/19 1405 110/66 98.4  F (36.9  C) Tympanic 83 16 1.651 m (5' 5\") 78.2 kg (172 lb 4.8 oz)     General: NAD  CV: Regular rate, warm and well perfused  Resp: breathing comfortably on room air   Extremities: non-tender, non-edematous     A/P: 34year old now P1, 6wks pp. Pregnancy and delivery uncomplicated.   Appropriate postpartum recovery.   Plan for Nexplanon for contraception.     Plan for routine GYN cares, PAP smear due 2023.       Uma Watkins MD, MD  [facility]   10/31/2019 2:55 PM    "

## 2019-10-31 NOTE — NURSING NOTE
"Initial /66 (BP Location: Left arm, Patient Position: Chair, Cuff Size: Adult Regular)   Pulse 83   Temp 98.4  F (36.9  C) (Tympanic)   Resp 16   Ht 1.651 m (5' 5\")   Wt 78.2 kg (172 lb 4.8 oz)   LMP 12/10/2018   Breastfeeding? Yes   BMI 28.67 kg/m   Estimated body mass index is 28.67 kg/m  as calculated from the following:    Height as of this encounter: 1.651 m (5' 5\").    Weight as of this encounter: 78.2 kg (172 lb 4.8 oz). .      "

## 2019-11-02 ASSESSMENT — ANXIETY QUESTIONNAIRES: GAD7 TOTAL SCORE: 1

## 2019-11-07 ENCOUNTER — MYC MEDICAL ADVICE (OUTPATIENT)
Dept: ALLERGY | Facility: CLINIC | Age: 34
End: 2019-11-07

## 2019-11-07 NOTE — TELEPHONE ENCOUNTER
Please see Incentientt message from patient.  She was last seen by Dr. Martinez on 18.  Patient would like to restart shots.  Her green vial has  and the rest will  in December. Last injection was 1/10/19.  Should patient come in for a f/u appointment first?  And then can sign consent, re-educate, etc?  Please advise.  Ele Barr RN

## 2019-11-07 NOTE — TELEPHONE ENCOUNTER
Considering how far we are since we had the discussion, and will need some dilutions, I would recommend the patient to be seen for future plan and consent.    Shun Martinez MD

## 2019-11-08 NOTE — TELEPHONE ENCOUNTER
RE: Updates about my health   Message [phone]   From  Ele Barr RN To  Teresa Salinas Sent and Delivered  11/7/2019  1:34 PM   Last Read in [employer]  11/7/2019  2:08 PM by Teresa Salinas     Patient read message.  Closing note.  Ele Barr RN

## 2019-12-30 ENCOUNTER — TELEPHONE (OUTPATIENT)
Dept: ALLERGY | Facility: CLINIC | Age: 34
End: 2019-12-30

## 2019-12-30 NOTE — TELEPHONE ENCOUNTER
Patient's Blue 1:100, Yellow 1:10 and Red 1:1 serums  on 12/10/19. Last injection given 01/10/19. Serums discarded.    Juan Aceves LPN

## 2020-01-09 ENCOUNTER — HOSPITAL ENCOUNTER (EMERGENCY)
Facility: CLINIC | Age: 35
Discharge: HOME | End: 2020-01-09
Attending: NURSE PRACTITIONER | Admitting: NURSE PRACTITIONER
Payer: COMMERCIAL

## 2020-01-09 VITALS
SYSTOLIC BLOOD PRESSURE: 122 MMHG | OXYGEN SATURATION: 96 % | DIASTOLIC BLOOD PRESSURE: 78 MMHG | RESPIRATION RATE: 18 BRPM | TEMPERATURE: 100.4 F

## 2020-01-09 DIAGNOSIS — J11.1 INFLUENZA-LIKE ILLNESS: ICD-10-CM

## 2020-01-09 LAB
FLUAV AG UPPER RESP QL IA.RAPID: NEGATIVE
FLUBV AG UPPER RESP QL IA.RAPID: NEGATIVE
INTERNAL QC OK POCT: YES
INTERNAL QC OK POCT: YES
S PYO AG THROAT QL IA.RAPID: NEGATIVE

## 2020-01-09 PROCEDURE — G0463 HOSPITAL OUTPT CLINIC VISIT: HCPCS

## 2020-01-09 PROCEDURE — 99213 OFFICE O/P EST LOW 20 MIN: CPT | Mod: Z6 | Performed by: NURSE PRACTITIONER

## 2020-01-09 PROCEDURE — 87081 CULTURE SCREEN ONLY: CPT | Performed by: NURSE PRACTITIONER

## 2020-01-09 PROCEDURE — 87880 STREP A ASSAY W/OPTIC: CPT | Performed by: NURSE PRACTITIONER

## 2020-01-09 PROCEDURE — 87804 INFLUENZA ASSAY W/OPTIC: CPT | Performed by: NURSE PRACTITIONER

## 2020-01-09 ASSESSMENT — ENCOUNTER SYMPTOMS
SHORTNESS OF BREATH: 0
CHILLS: 1
RHINORRHEA: 0
DIZZINESS: 0
EYE REDNESS: 0
SORE THROAT: 1
VOMITING: 0
JOINT SWELLING: 0
WEAKNESS: 0
SINUS PAIN: 0
COUGH: 1
NUMBNESS: 0
FATIGUE: 0
TROUBLE SWALLOWING: 0
ABDOMINAL PAIN: 0
FEVER: 1
EYE DISCHARGE: 0
HEADACHES: 0
MYALGIAS: 1
SINUS PRESSURE: 0
NAUSEA: 0
LIGHT-HEADEDNESS: 0
ARTHRALGIAS: 0

## 2020-01-09 NOTE — ED AVS SNAPSHOT
[facility] Emergency Department  [address] [address]  Phone:  [phone]  Fax:  [phone]                                    Teresa Salinas   MRN: [id number]    Department:  [facility] Emergency Department   Date of Visit:  1/9/2020           After Visit Summary Signature Page    I have received my discharge instructions, and my questions have been answered. I have discussed any challenges I see with this plan with the nurse or doctor.    ..........................................................................................................................................  Patient/Patient Representative Signature      ..........................................................................................................................................  Patient Representative Print Name and Relationship to Patient    ..................................................               ................................................  Date                                   Time    ..........................................................................................................................................  Reviewed by Signature/Title    ...................................................              ..............................................  Date                                               Time          22EPIC Rev 08/18

## 2020-01-10 NOTE — ED PROVIDER NOTES
History     Chief Complaint   Patient presents with     Cough     Flu Symptoms     HPI  Teresa Salinas is a 34 year old female who presents the urgent care for evaluation of fever, chills, body aches, pharyngitis and dry cough beginning today.  T-max at home 101.5  F responding to ibuprofen.  Patient denies headache, congestion, shortness of breath, chest pain and abdominal pain.  Multiple sick contacts.  Patient did receive her influenza vaccine this season.    Allergies:  Allergies   Allergen Reactions     Contrast Dye Shortness Of Breath     Clindamycin Hives     Diagnostic X-Ray Materials Itching     Itching, sneezing.       Problem List:    Patient Active Problem List    Diagnosis Date Noted     Nexplanon insertion- Remove by 10/2022 10/31/2019     Priority: Medium     Right Arm    NDC: 2630-6440-24    Exp: 11/25/2021  Lot: [id number]    Lora Seaamn LPN         Allergic rhinitis due to pollen 11/29/2018     Priority: Medium     Percutaneous skin puncture testing for aeroallergens performed on November 26, 2018 showed sensitivity to dog and pollen of grass mix #7.       Mild intermittent asthma 06/12/2015     Priority: Medium     Family history of thyroid disease 06/12/2015     Priority: Medium     Allergic rhinitis due to dust mite 03/11/2014     Priority: Medium        Past Medical History:    Past Medical History:   Diagnosis Date     Allergic rhinitis 3/11/2014     Asthma 3/11/2014     Chickenpox      Depression      Family history of thyroid disease 6/12/2015       Past Surgical History:    Past Surgical History:   Procedure Laterality Date     Arthoscopic right ankle Right 1999     OSTEOTOMY ANKLE Right 2003       Family History:    Family History   Problem Relation Age of Onset     Diabetes Father         typeII     Hypertension Father      Hyperlipidemia Father      Depression Father      Seasonal/Environmental Allergies Father      Hyperlipidemia Maternal Grandmother      Hyperlipidemia Mother       Thyroid Disease Mother      Seasonal/Environmental Allergies Mother      Colon Cancer Maternal Grandfather      Asthma Sister      Seasonal/Environmental Allergies Sister      Other - See Comments Sister         clotting disorder     Thyroid Disease Sister      Chronic Obstructive Pulmonary Disease Paternal Grandmother      Esophageal Cancer Paternal Grandfather        Social History:  Marital Status:   [2]  Social History     Tobacco Use     Smoking status: Never Smoker     Smokeless tobacco: Never Used   Substance Use Topics     Alcohol use: No     Alcohol/week: 0.0 standard drinks     Drug use: No        Medications:    albuterol (PROAIR HFA/PROVENTIL HFA/VENTOLIN HFA) 108 (90 Base) MCG/ACT Inhaler  cetirizine (ZYRTEC) 10 MG tablet  Cholecalciferol (VITAMIN D) 2000 UNITS tablet  cromolyn (OPTICROM) 4 % ophthalmic solution  EPINEPHrine (AUVI-Q) 0.3 MG/0.3ML injection 2-pack  etonogestrel (IMPLANON/NEXPLANON) 68 MG IMPL  ORDER FOR ALLERGEN IMMUNOTHERAPY  Prenatal Vit-Fe Fumarate-FA (PRENATAL MULTIVITAMIN W/IRON) 27-0.8 MG tablet          Review of Systems   Constitutional: Positive for chills and fever. Negative for fatigue.   HENT: Positive for sore throat. Negative for congestion, ear discharge, ear pain, rhinorrhea, sinus pressure, sinus pain and trouble swallowing.    Eyes: Negative for discharge and redness.   Respiratory: Positive for cough. Negative for shortness of breath.    Cardiovascular: Negative for chest pain.   Gastrointestinal: Negative for abdominal pain, nausea and vomiting.   Musculoskeletal: Positive for myalgias. Negative for arthralgias and joint swelling.   Skin: Negative for rash.   Neurological: Negative for dizziness, weakness, light-headedness, numbness and headaches.       Physical Exam   BP: 122/78  Heart Rate: 101  Temp: 100.4  F (38  C)  Resp: 18  SpO2: 96 %      Physical Exam  Constitutional:       General: She is not in acute distress.     Appearance: She is  well-developed. She is not diaphoretic.   HENT:      Right Ear: Tympanic membrane normal.      Left Ear: Tympanic membrane normal.      Nose: Nose normal.      Mouth/Throat:      Mouth: Mucous membranes are moist.      Pharynx: Oropharynx is clear. Uvula midline. Posterior oropharyngeal erythema present. No uvula swelling.      Tonsils: No tonsillar exudate or tonsillar abscesses. Swellin+ on the right. 1+ on the left.   Eyes:      Conjunctiva/sclera: Conjunctivae normal.      Pupils: Pupils are equal, round, and reactive to light.   Neck:      Musculoskeletal: Normal range of motion and neck supple.   Cardiovascular:      Rate and Rhythm: Normal rate and regular rhythm.   Pulmonary:      Effort: Pulmonary effort is normal. No respiratory distress.      Breath sounds: Normal breath sounds.   Abdominal:      General: There is no distension.      Palpations: Abdomen is soft.      Tenderness: There is no abdominal tenderness.   Musculoskeletal: Normal range of motion.   Lymphadenopathy:      Cervical: No cervical adenopathy.   Skin:     General: Skin is warm.      Capillary Refill: Capillary refill takes less than 2 seconds.   Neurological:      Mental Status: She is alert and oriented to person, place, and time.         ED Course        Procedures      Results for orders placed or performed during the hospital encounter of 20 (from the past 24 hour(s))   Influenza A/B antigen POCT   Result Value Ref Range    Influenza A Negative neg    Influenza B Negative neg    Internal QC OK Yes    Rapid strep group A screen POCT   Result Value Ref Range    Rapid Strep A Screen Negative neg    Internal QC OK Yes        Medications - No data to display    Assessments & Plan (with Medical Decision Making)   Patient is a 34-year-old female who presents the urgent care for evaluation of influenza-like illness.  Influenza a and B are negative.  Rapid strep negative, culture pending.  Discussed likely viral etiology of  symptoms.  Discussed worrisome reasons to seek immediate evaluation.  Patient agrees to not begin Tamiflu at this time.  May use over the counter medications as needed and appropriate. Increase rest and hydration. Return precautions reviewed, all questions answered. Patient agreeable to plan of care and discharged in stable condition.    I have reviewed the nursing notes.    I have reviewed the findings, diagnosis, plan and need for follow up with the patient.    Discharge Medication List as of 1/9/2020  8:09 PM          Final diagnoses:   Influenza-like illness       1/9/2020   [facility] EMERGENCY DEPARTMENT     Aria Mendoza, APRN CNP  01/09/20 2021

## 2020-01-11 LAB
BACTERIA SPEC CULT: NORMAL
Lab: NORMAL
SPECIMEN SOURCE: NORMAL

## 2020-01-11 NOTE — RESULT ENCOUNTER NOTE
Final Beta strep group A r/o culture is NEGATIVE for Group A streptococcus.    No treatment or change in treatment per [place] Strep protocol.

## 2020-01-16 ENCOUNTER — MEDICAL CORRESPONDENCE (OUTPATIENT)
Dept: HEALTH INFORMATION MANAGEMENT | Facility: CLINIC | Age: 35
End: 2020-01-16
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
## 2020-09-14 ENCOUNTER — TELEPHONE (OUTPATIENT)
Dept: FAMILY MEDICINE | Facility: CLINIC | Age: 35
End: 2020-09-14

## 2020-09-14 NOTE — LETTER
September 14, 2020      Teresa Salinas  [address] [address]        Dear Teresa,     Your [facility] Team works hard to make sure that you and your family receive exceptional care. Enclosed you will find a copy of the Asthma Control Test (ACT) that our clinic uses to monitor and manage your asthma. This test is an assessment tool that we use to determine how well your asthma is controlled.  Please complete the enclosed form and return in the provided envelope.  Thank you for trusting us with your health care.    Sincerely,        Your [facility] Care Team/margot

## 2020-09-14 NOTE — TELEPHONE ENCOUNTER
Patient Quality Outreach Summary      Summary:    Patient is due/failing the following:   ACT needed    Type of outreach:    Sent letter.    Questions for provider review:    None                                                                                                                    VIRIDIANA Casper MA

## 2020-09-29 NOTE — TELEPHONE ENCOUNTER
ACT completed and returned in the mail, score of 24.    Asthma Control Test (Copyright [employer], 2011; Used with Permission) 9/29/2020   ACT PREVENT WORK    ACT SOB    ACT NIGHT SYMPTOMS    ACT RESCUE    ACT CONTROL    ACT Total Score (Goal >/= 20)    ASTHMA ER VISITS    ASTHMA HOSPITALIZATIONS    1.  In the past 4 weeks, how much of the time did your asthma keep you from getting as much done at work, school or at home? 5   2.  During the past 4 weeks, how often have you had shortness of breath? 5   3.  During the past 4 weeks, how often did your asthma symptoms (wheezing, coughing, shortness of breath, chest tightness or pain) wake you up at night or earlier than usual in the morning? 5   4.  During the past 4 weeks, how often have you used your rescue inhaler or nebulizer medication (such as albuterol)? 4   5.  How would you rate your asthma control during the past 4 weeks? 5   ACT Total Score (Goal >/= 20) 24   In the past 12 months, how many times did you visit the emergency room for your asthma without being admitted to the hospital? 0   In the past 12 months, how many times were you hospitalized overnight because of your asthma? 0

## 2020-09-30 ASSESSMENT — ASTHMA QUESTIONNAIRES: ACT_TOTALSCORE: 24

## 2021-01-10 ENCOUNTER — HEALTH MAINTENANCE LETTER (OUTPATIENT)
Age: 36
End: 2021-01-10

## 2021-10-23 ENCOUNTER — HEALTH MAINTENANCE LETTER (OUTPATIENT)
Age: 36
End: 2021-10-23

## 2021-11-10 ENCOUNTER — OFFICE VISIT (OUTPATIENT)
Dept: OBGYN | Facility: CLINIC | Age: 36
End: 2021-11-10
Payer: COMMERCIAL

## 2021-11-10 VITALS
DIASTOLIC BLOOD PRESSURE: 66 MMHG | RESPIRATION RATE: 12 BRPM | HEIGHT: 65 IN | WEIGHT: 184.6 LBS | SYSTOLIC BLOOD PRESSURE: 111 MMHG | BODY MASS INDEX: 30.75 KG/M2 | HEART RATE: 84 BPM | TEMPERATURE: 98.8 F

## 2021-11-10 DIAGNOSIS — Z30.46 ENCOUNTER FOR NEXPLANON REMOVAL: Primary | ICD-10-CM

## 2021-11-10 PROCEDURE — 11982 REMOVE DRUG IMPLANT DEVICE: CPT | Performed by: OBSTETRICS & GYNECOLOGY

## 2021-11-10 ASSESSMENT — MIFFLIN-ST. JEOR: SCORE: 1538.22

## 2021-11-10 NOTE — NURSING NOTE
"Initial /66 (BP Location: Left arm, Patient Position: Sitting, Cuff Size: Adult Large)   Pulse 84   Temp 98.8  F (37.1  C) (Tympanic)   Resp 12   Ht 1.651 m (5' 5\")   Wt 83.7 kg (184 lb 9.6 oz)   LMP 10/04/2021 (Exact Date)   BMI 30.72 kg/m   Estimated body mass index is 30.72 kg/m  as calculated from the following:    Height as of this encounter: 1.651 m (5' 5\").    Weight as of this encounter: 83.7 kg (184 lb 9.6 oz). .      "

## 2021-11-10 NOTE — PROGRESS NOTES
[facility]    Nexplanon Removal Procedure Note      Teresa Salinas  1985  [id number]    Indications:    Teresa Salinas is a 36 year old year old female, who is here today for Nexplanon removal.    The patient was counseled on the risks (including including risk of infection and bleeding) and benefits of the procedure. Desires to proceed. Verbal and written consent were obtained.     Procedure:    The patient was placed in the dorsal position with the right arm elevated. The Nexplanon was palpated. The area was cleaned with betadine swabs x3. Local anesthesia was injected. A small incision was made. The implant was then removed using a curved hemostat and scalpel.  The Nexplanon implant was removed in its entirety, intact without disruption of the implant. The implant was disposed of properly. The area was cleaned and bandaged. The patient tolerated the procedure well. EBL: 2cc.     Post Procedure:    She was given post procedure instructions with appropriate precautions.     Contraceptive plan: N/A, desires pregnancy    Reviewed medication list with patient with future pregnancy in mind. Encouraged use of prenatal vitamin or women's vitamin with folic acid supplementation.      Esetr Watkins DO

## 2021-11-12 ENCOUNTER — HOSPITAL ENCOUNTER (OUTPATIENT)
Dept: CT IMAGING | Facility: CLINIC | Age: 36
Discharge: HOME | End: 2021-11-12
Attending: INTERNAL MEDICINE | Admitting: INTERNAL MEDICINE
Payer: COMMERCIAL

## 2021-11-12 ENCOUNTER — OFFICE VISIT (OUTPATIENT)
Dept: FAMILY MEDICINE | Facility: CLINIC | Age: 36
End: 2021-11-12
Payer: COMMERCIAL

## 2021-11-12 VITALS
WEIGHT: 185.3 LBS | DIASTOLIC BLOOD PRESSURE: 72 MMHG | TEMPERATURE: 97.5 F | BODY MASS INDEX: 30.84 KG/M2 | OXYGEN SATURATION: 98 % | SYSTOLIC BLOOD PRESSURE: 122 MMHG | RESPIRATION RATE: 16 BRPM | HEART RATE: 76 BPM

## 2021-11-12 DIAGNOSIS — J01.01 ACUTE RECURRENT MAXILLARY SINUSITIS: Primary | ICD-10-CM

## 2021-11-12 DIAGNOSIS — J01.01 ACUTE RECURRENT MAXILLARY SINUSITIS: ICD-10-CM

## 2021-11-12 PROCEDURE — 70486 CT MAXILLOFACIAL W/O DYE: CPT

## 2021-11-12 PROCEDURE — 99214 OFFICE O/P EST MOD 30 MIN: CPT | Performed by: INTERNAL MEDICINE

## 2021-11-12 RX ORDER — AZELASTINE 1 MG/ML
1 SPRAY, METERED NASAL 2 TIMES DAILY
Qty: 30 ML | Refills: 11 | Status: SHIPPED | OUTPATIENT
Start: 2021-11-12 | End: 2021-12-30

## 2021-11-12 RX ORDER — FAMOTIDINE 20 MG/1
20 TABLET, FILM COATED ORAL DAILY
COMMUNITY
End: 2022-06-15

## 2021-11-12 NOTE — PROGRESS NOTES
Assessment & Plan     Acute recurrent maxillary sinusitis    Teresa presents with recurrent sinus issues- she reports 10 episodes in the past year.  Typically these self resolve, so may be allergy or viral in etiology, but the current episode has lasted much longer than normal and won't go away, so we'll start Augmentin for possible bacterial etiology.  Resume the prior Astelin she was on, continue Zyrtec.  Due to recurrent symptoms, recommended sinus CT which shows evidence of sinusitis, sinus obstruction, and mildly deviated nasal septum.  Recommend ENT follow-up.      - Otolaryngology Referral; Future  - CT Sinus w/o Contrast; Future  - azelastine (ASTELIN) 0.1 % nasal spray; Spray 1 spray into both nostrils 2 times daily  - amoxicillin-clavulanate (AUGMENTIN) 875-125 MG tablet; Take 1 tablet by mouth 2 times daily for 7 days          Follow-up as needed if not improving in a week or new/worsening symptoms develop.      Ryan Leong MD  [facility]   Teresa is a 36 year old who presents for the following health issues     History of Present Illness       She eats 4 or more servings of fruits and vegetables daily.She consumes 0 sweetened beverage(s) daily.She exercises with enough effort to increase her heart rate 20 to 29 minutes per day.  She exercises with enough effort to increase her heart rate 4 days per week.   She is taking medications regularly.       Concern - chronic sinusitis  Onset: about 3 weeks now  Description: has had about 10 sinus infections in the last year but typically they resolve on their own after 2 weeks.  Has not been prescribed antibiotics here since 2019, she had one prescription about 6 or more months ago with Augmentin, this worked after a few days and she tolerated it fine  Intensity: moderate  Progression of Symptoms:  same  Accompanying Signs & Symptoms: sinus congestion all over, thick green nasal drainage, no fever/chills, coughing due to  the drainage down the back of the throat, throat is sore, ears both feel full and some mild eye pain  Previous history of similar problem: yes  Precipitating factors:        Worsened by: none       Son in   Alleviating factors:        Improved by: none  Therapies tried and outcome: OTC decongestants but they haven't really helped.  On Zyrtec for allergies.  She used to be on Astelin nasal sprays but stopped with pregnancy         Review of Systems   Constitutional, HEENT, pulm systems are negative, except as otherwise noted.      Objective    /72 (BP Location: Left arm, Patient Position: Sitting, Cuff Size: Adult Regular)   Pulse 76   Temp 97.5  F (36.4  C) (Tympanic)   Resp 16   Wt 84.1 kg (185 lb 4.8 oz)   LMP 10/04/2021 (Exact Date)   SpO2 98%   Breastfeeding No   BMI 30.84 kg/m    Body mass index is 30.84 kg/m .  Physical Exam     GENERAL: alert and no distress,  EYES: Eyes grossly normal to inspection, PERRL and conjunctivae and sclerae normal  HENT: ear canals and TMs normal, maxillary sinuses are tender to percussion, nose and mouth without ulcers or lesions, oropharynx normal  NECK: no adenopathy, no asymmetry, masses, or scars  RESP: lungs clear to auscultation - no rales, rhonchi or wheezes  CV: regular rate and rhythm, normal S1 S2, no S3 or S4, no murmur, click or rub     No results found for this or any previous visit (from the past 24 hour(s)).

## 2021-11-30 NOTE — PROGRESS NOTES
Chief Complaint   Patient presents with     Sinusitis     History of Present Illness   Teresa Salinas is a 36 year old female who presents for nose and sinus evaluation. I am seeing this patient in consultation for acute recurrent maxillary sinusitis at the request of the provider Dr. Leong.The patient describes symptoms of chronic nasal congestion, rhinorrhea, postnasal drainage for the past several years.  The patient has intermittent symptoms of acute sinusitis where she has increasing congestion, nasal drainage, facial pressure that eventually will respond to antibiotic treatment.  Over the past 18 months or so, she has had between 8 and 10 episodes where she is had severe symptoms.  During the onset of 1 her most recent episodes, she underwent CT imaging.      The patient underwent a sinus CT with acute symptoms on 11/12/2021.  My review of the CT scan shows significant paranasal sinus mucosal thickening in the bilateral maxillary sinuses worse on the right.  There is near complete opacification of the right maxillary sinus.  There is significant thickening of the sinonasal mucosa in the ethmoid systems right greater than left.  The patient has an aplastic right frontal sinus.  The left frontal sinus is well aerated.  The sphenoid sinuses appear normal.  The patient does have a left nasal septal deviation and moderately severe inferior turbinate hypertrophy bilaterally.    He had a long history of nose and sinus problems as well as chronic allergy issues.  She has had multiple positives on allergy testing in the past and previously underwent allergy immunotherapy.  She had to stop her immunotherapy when she became pregnant and has not returned to using her allergy injection immunotherapy since 2019.  Treatment for her sinus issues have included oral and topical antihistamines, decongestants, topical nasal irrigations, topical nasal steroids.  From a symptom standpoint, the patient reports bilateral nasal  obstruction, nasal congestion, rhinorrhea, postnasal drainage.  No prior history of sinus surgery.  No previous nasal trauma.     Past Medical History  Patient Active Problem List   Diagnosis     Allergic rhinitis due to dust mite     Mild intermittent asthma     Family history of thyroid disease     Allergic rhinitis due to pollen     Nexplanon insertion- Remove by 10/2022     Current Medications     Current Outpatient Medications:      cetirizine (ZYRTEC) 10 MG tablet, Take 10 mg by mouth daily , Disp: 30 tablet, Rfl: 1     Cholecalciferol (VITAMIN D) 2000 UNITS tablet, Take 2,000 Units by mouth daily, Disp: 100 tablet, Rfl: 3     famotidine (PEPCID) 20 MG tablet, Take 20 mg by mouth daily, Disp: , Rfl:      Prenatal Vit-Fe Fumarate-FA (PRENATAL MULTIVITAMIN W/IRON) 27-0.8 MG tablet, Take 1 tablet by mouth daily, Disp: , Rfl:      azelastine (ASTELIN) 0.1 % nasal spray, Spray 1 spray into both nostrils 2 times daily (Patient not taking: Reported on 12/9/2021), Disp: 30 mL, Rfl: 11    Allergies  Allergies   Allergen Reactions     Contrast Dye Shortness Of Breath     Clindamycin Hives     Diagnostic X-Ray Materials Itching     Itching, sneezing.       Social History   Social History     Socioeconomic History     Marital status:      Spouse name: Braxton     Number of children: 0     Years of education: 16     Highest education level: Not on file   Occupational History     Occupation: Registered Nurse     Employer: [employer]     Comment: 5.5 years   Tobacco Use     Smoking status: Never Smoker     Smokeless tobacco: Never Used   Vaping Use     Vaping Use: Never used   Substance and Sexual Activity     Alcohol use: No     Alcohol/week: 0.0 standard drinks     Drug use: No     Sexual activity: Yes     Partners: Male   Other Topics Concern     Parent/sibling w/ CABG, MI or angioplasty before 65F 55M? No   Social History Narrative    November 26, 2018            ENVIRONMENTAL HISTORY: The family  [place] The home is heated with a forced air and gas furnace. They do have central air conditioning. The patient's bedroom is furnished with feather/wool bedding or pillows and carpeting in bedroom.  Pets inside the house include 1 dog(s). There is no history of cockroach or mice infestation. There is/are 0 smokers in the house.  The house does not have a damp basement.      Social Determinants of Health     Financial Resource Strain: Not on file   Food Insecurity: Not on file   Transportation Needs: Not on file   Physical Activity: Not on file   Stress: Not on file   Social Connections: Not on file   Intimate Partner Violence: Not on file   Housing Stability: Not on file       Family History  Family History   Problem Relation Age of Onset     Diabetes Father         typeII     Hypertension Father      Hyperlipidemia Father      Depression Father      Seasonal/Environmental Allergies Father      Hyperlipidemia Maternal Grandmother      Hyperlipidemia Mother      Thyroid Disease Mother      Seasonal/Environmental Allergies Mother      Colon Cancer Maternal Grandfather      Asthma Sister      Seasonal/Environmental Allergies Sister      Other - See Comments Sister         clotting disorder     Thyroid Disease Sister      Chronic Obstructive Pulmonary Disease Paternal Grandmother      Esophageal Cancer Paternal Grandfather        Review of Systems  As per HPI and PMHx, otherwise 10+ comprehensive system review is negative.    Physical Exam  /76 (BP Location: Right arm, Patient Position: Sitting, Cuff Size: Adult Large)   Pulse 70   Temp (!) 96.1  F (35.6  C) (Tympanic)   Ht 1.651 m (5' 5\")   Wt 83.9 kg (185 lb)   BMI 30.79 kg/m    GENERAL: Patient is a pleasant, cooperative 36 year old female in no acute distress.  HEAD: Normocephalic, atraumatic.  Hair and scalp are normal.  EYES: Pupils are equal, round, reactive to light and accommodation.  " Extraocular movements are intact.  The sclera nonicteric without injection.  The extraocular structures are normal.  EARS: Normal shape and symmetry.  No tenderness when palpating the mastoid or tragal areas bilaterally.  Otoscopic exam reveals a minimal amount of cerumen bilaterally.  The bilateral tympanic membranes are round, intact without evidence of effusion, good landmarks.  No retraction, granulation, or drainage.  NOSE: Nares are patent.  Nasal mucosa is boggy and inflamed with sticky, inflammatory mucus.  The patient has a leftward nasal septal deviation and severe/marked inferior turbinate hypertrophy left greater than right.  No nasal cavity masses, polyps, or mucopurulence on anterior rhinoscopy.  NEUROLOGIC: Cranial nerves II through XII are grossly intact.  Voice is strong.  Patient is House-Brackmann I/VI bilaterally.  CARDIOVASCULAR: Extremities are warm and well-perfused.  No significant peripheral edema.  RESPIRATORY: Patient has nonlabored breathing without cough, wheeze, stridor.  PSYCHIATRIC: Patient is alert and oriented.  Mood and affect appear normal.  SKIN: Warm and dry.  No scalp, face, or neck lesions noted.    Procedure: Flexible Nasal Endoscopy  Indication: Recurrent acute sinusitis, chronic sinusitis symptoms    To best visualize the sinonasal anatomy and due to the chief complaint and HPI, I proceeded with flexible fiberoptic nasal endoscopy.  The bilateral nasal cavities were anesthetized and decongested with a mixture of lidocaine and neosynephrine.  The bilateral nasal cavities were then examined using flexible fiberoptic nasal endoscope.  The right nasal cavity was without masses or polyps.  There was mucopurulent drainage coming from the middle meatus trailing into the posterior nasopharynx on the right.  The right middle turbinate and middle meatus was normal in appearance.  The sphenoethmoid recess was clear.  The left nasal cavity was without masses, polyps, or mucopurulence.   The left middle turbinate and middle meatus was normal in appearance.  The sphenoethmoid recess was clear.  The sinonasal mucosa appeared boggy and inflamed with sticky, inflammatory mucus.  The nasal septum deviates to the left of the anterior and mid septum.  The nasopharynx had a normal appearance with normal Eustachian tube openings and fossa of Rosenmuller bilaterally.  Minimal adenoid tissue.  The scope was removed.  The patient tolerated the procedure well.    Assessment and Plan     ICD-10-CM    1. Chronic maxillary sinusitis  J32.0 NASAL ENDOSCOPY, DIAGNOSTIC     Asymptomatic COVID-19 Virus (Coronavirus) by PCR Nose     Case Request: FUNCTIONAL ENDOSCOPIC SINUS SURGERY, WITH NASAL SEPTOPLASTY   2. Chronic anterior ethmoidal sinusitis  J32.2 NASAL ENDOSCOPY, DIAGNOSTIC     Asymptomatic COVID-19 Virus (Coronavirus) by PCR Nose     Case Request: FUNCTIONAL ENDOSCOPIC SINUS SURGERY, WITH NASAL SEPTOPLASTY   3. History of acute sinusitis  Z87.09 NASAL ENDOSCOPY, DIAGNOSTIC     Asymptomatic COVID-19 Virus (Coronavirus) by PCR Nose     Case Request: FUNCTIONAL ENDOSCOPIC SINUS SURGERY, WITH NASAL SEPTOPLASTY   4. Nasal obstruction  J34.89 NASAL ENDOSCOPY, DIAGNOSTIC     Asymptomatic COVID-19 Virus (Coronavirus) by PCR Nose     Case Request: FUNCTIONAL ENDOSCOPIC SINUS SURGERY, WITH NASAL SEPTOPLASTY   5. Deviated nasal septum  J34.2 NASAL ENDOSCOPY, DIAGNOSTIC     Asymptomatic COVID-19 Virus (Coronavirus) by PCR Nose     Case Request: FUNCTIONAL ENDOSCOPIC SINUS SURGERY, WITH NASAL SEPTOPLASTY   6. Hypertrophy of both inferior nasal turbinates  J34.3 NASAL ENDOSCOPY, DIAGNOSTIC     Asymptomatic COVID-19 Virus (Coronavirus) by PCR Nose     Case Request: FUNCTIONAL ENDOSCOPIC SINUS SURGERY, WITH NASAL SEPTOPLASTY   7. History of allergic rhinitis  Z87.09 NASAL ENDOSCOPY, DIAGNOSTIC     Asymptomatic COVID-19 Virus (Coronavirus) by PCR Nose     Case Request: FUNCTIONAL ENDOSCOPIC SINUS SURGERY, WITH NASAL SEPTOPLASTY      It was my pleasure seeing Teresa Salinas today in clinic.  The patient presents with chronic sinusitis symptoms in the setting of multiple episodes of recurrent acute sinusitis.  She is having at least 4 more episodes per year requiring treatment.  Continue to have problems despite medical management including topical nasal saline irrigations, topical nasal antihistamines, and topical nasal steroids.  She is also had allergy injection immunotherapy in the past but continues to have symptoms.  Based on her CT imaging and endoscopic findings today in clinic, she would be a good candidate for endoscopic sinus surgery.     We discussed the risks, benefits, alternatives, options of endonasal septoplasty, bilateral inferior turbinate reduction, bilateral endoscopic maxillary antrostomies, bilateral endoscopic anterior ethmoidectomies including, but not limited to: Risk of bleeding, risk of infection, risk of postoperative pain, risk of septal hematoma, risk of septal perforation, risk of CSF leak, risk of injury to the orbital contents, risk of intranasal scarring or adhesions, risk of smell disturbance or smell loss, potential need for additional procedures, risk of general anesthesia.  The patient voiced understanding and is willing to proceed.  We discussed the postoperative course and convalescence including lifting and activity restrictions, placement of nasal splints, nasal saline irrigations postoperatively, postoperative debridement, and follow-up.    Miguel Angel Marcano MD  Department of Otolaryngology-Head and Neck Surgery  [facility]

## 2021-12-09 ENCOUNTER — HOSPITAL ENCOUNTER (OUTPATIENT)
Facility: CLINIC | Age: 36
End: 2021-12-09
Attending: OTOLARYNGOLOGY | Admitting: OTOLARYNGOLOGY
Payer: COMMERCIAL

## 2021-12-09 ENCOUNTER — OFFICE VISIT (OUTPATIENT)
Dept: OTOLARYNGOLOGY | Facility: CLINIC | Age: 36
End: 2021-12-09
Payer: COMMERCIAL

## 2021-12-09 VITALS
BODY MASS INDEX: 30.82 KG/M2 | WEIGHT: 185 LBS | DIASTOLIC BLOOD PRESSURE: 76 MMHG | SYSTOLIC BLOOD PRESSURE: 113 MMHG | HEART RATE: 70 BPM | HEIGHT: 65 IN | TEMPERATURE: 96.1 F

## 2021-12-09 DIAGNOSIS — J34.3 HYPERTROPHY OF BOTH INFERIOR NASAL TURBINATES: ICD-10-CM

## 2021-12-09 DIAGNOSIS — Z87.09 HISTORY OF ACUTE SINUSITIS: ICD-10-CM

## 2021-12-09 DIAGNOSIS — J34.2 DEVIATED NASAL SEPTUM: ICD-10-CM

## 2021-12-09 DIAGNOSIS — Z87.09 HISTORY OF ALLERGIC RHINITIS: ICD-10-CM

## 2021-12-09 DIAGNOSIS — J32.2 CHRONIC ANTERIOR ETHMOIDAL SINUSITIS: ICD-10-CM

## 2021-12-09 DIAGNOSIS — J34.89 NASAL OBSTRUCTION: ICD-10-CM

## 2021-12-09 DIAGNOSIS — J32.0 CHRONIC MAXILLARY SINUSITIS: Primary | ICD-10-CM

## 2021-12-09 PROCEDURE — 99204 OFFICE O/P NEW MOD 45 MIN: CPT | Mod: 25 | Performed by: OTOLARYNGOLOGY

## 2021-12-09 PROCEDURE — 31231 NASAL ENDOSCOPY DX: CPT | Performed by: OTOLARYNGOLOGY

## 2021-12-09 ASSESSMENT — MIFFLIN-ST. JEOR: SCORE: 1540.03

## 2021-12-09 NOTE — LETTER
12/9/2021         RE: Teresa Salinas  [address] [facility] [id number]        Dear Colleague,    Thank you for referring your patient, Teresa Salinas, to the [facility]. Please see a copy of my visit note below.    Chief Complaint   Patient presents with     Sinusitis     History of Present Illness   Teresa Salinas is a 36 year old female who presents for nose and sinus evaluation. I am seeing this patient in consultation for acute recurrent maxillary sinusitis at the request of the provider Dr. Leong.The patient describes symptoms of chronic nasal congestion, rhinorrhea, postnasal drainage for the past several years.  The patient has intermittent symptoms of acute sinusitis where she has increasing congestion, nasal drainage, facial pressure that eventually will respond to antibiotic treatment.  Over the past 18 months or so, she has had between 8 and 10 episodes where she is had severe symptoms.  During the onset of 1 her most recent episodes, she underwent CT imaging.      The patient underwent a sinus CT with acute symptoms on 11/12/2021.  My review of the CT scan shows significant paranasal sinus mucosal thickening in the bilateral maxillary sinuses worse on the right.  There is near complete opacification of the right maxillary sinus.  There is significant thickening of the sinonasal mucosa in the ethmoid systems right greater than left.  The patient has an aplastic right frontal sinus.  The left frontal sinus is well aerated.  The sphenoid sinuses appear normal.  The patient does have a left nasal septal deviation and moderately severe inferior turbinate hypertrophy bilaterally.    He had a long history of nose and sinus problems as well as chronic allergy issues.  She has had multiple positives on allergy testing in the past and previously underwent allergy immunotherapy.  She had to stop her immunotherapy when she became pregnant and has not returned to using her  allergy injection immunotherapy since 2019.  Treatment for her sinus issues have included oral and topical antihistamines, decongestants, topical nasal irrigations, topical nasal steroids.  From a symptom standpoint, the patient reports bilateral nasal obstruction, nasal congestion, rhinorrhea, postnasal drainage.  No prior history of sinus surgery.  No previous nasal trauma.     Past Medical History  Patient Active Problem List   Diagnosis     Allergic rhinitis due to dust mite     Mild intermittent asthma     Family history of thyroid disease     Allergic rhinitis due to pollen     Nexplanon insertion- Remove by 10/2022     Current Medications     Current Outpatient Medications:      cetirizine (ZYRTEC) 10 MG tablet, Take 10 mg by mouth daily , Disp: 30 tablet, Rfl: 1     Cholecalciferol (VITAMIN D) 2000 UNITS tablet, Take 2,000 Units by mouth daily, Disp: 100 tablet, Rfl: 3     famotidine (PEPCID) 20 MG tablet, Take 20 mg by mouth daily, Disp: , Rfl:      Prenatal Vit-Fe Fumarate-FA (PRENATAL MULTIVITAMIN W/IRON) 27-0.8 MG tablet, Take 1 tablet by mouth daily, Disp: , Rfl:      azelastine (ASTELIN) 0.1 % nasal spray, Spray 1 spray into both nostrils 2 times daily (Patient not taking: Reported on 12/9/2021), Disp: 30 mL, Rfl: 11    Allergies  Allergies   Allergen Reactions     Contrast Dye Shortness Of Breath     Clindamycin Hives     Diagnostic X-Ray Materials Itching     Itching, sneezing.       Social History   Social History     Socioeconomic History     Marital status:      Spouse name: Braxton     Number of children: 0     Years of education: 16     Highest education level: Not on file   Occupational History     Occupation: Registered Nurse     Employer: [employer]     Comment: 5.5 years   Tobacco Use     Smoking status: Never Smoker     Smokeless tobacco: Never Used   Vaping Use     Vaping Use: Never used   Substance and Sexual Activity     Alcohol use: No     Alcohol/week: 0.0  "standard drinks     Drug use: No     Sexual activity: Yes     Partners: Male   Other Topics Concern     Parent/sibling w/ CABG, MI or angioplasty before 65F 55M? No   Social History Narrative    November 26, 2018            ENVIRONMENTAL HISTORY: The [place] the [place] The home is heated with a forced air and gas furnace. They do have central air conditioning. The patient's bedroom is furnished with feather/wool bedding or pillows and carpeting in bedroom.  Pets inside the house include 1 dog(s). There is no history of cockroach or mice infestation. There is/are 0 smokers in the house.  The house does not have a damp basement.      Social Determinants of Health     Financial Resource Strain: Not on file   Food Insecurity: Not on file   Transportation Needs: Not on file   Physical Activity: Not on file   Stress: Not on file   Social Connections: Not on file   Intimate Partner Violence: Not on file   Housing Stability: Not on file       Family History  Family History   Problem Relation Age of Onset     Diabetes Father         typeII     Hypertension Father      Hyperlipidemia Father      Depression Father      Seasonal/Environmental Allergies Father      Hyperlipidemia Maternal Grandmother      Hyperlipidemia Mother      Thyroid Disease Mother      Seasonal/Environmental Allergies Mother      Colon Cancer Maternal Grandfather      Asthma Sister      Seasonal/Environmental Allergies Sister      Other - See Comments Sister         clotting disorder     Thyroid Disease Sister      Chronic Obstructive Pulmonary Disease Paternal Grandmother      Esophageal Cancer Paternal Grandfather        Review of Systems  As per HPI and PMHx, otherwise 10+ comprehensive system review is negative.    Physical Exam  /76 (BP Location: Right arm, Patient Position: Sitting, Cuff Size: Adult Large)   Pulse 70   Temp (!) 96.1  F (35.6  C) (Tympanic)   Ht 1.651 m (5' 5\")   Wt 83.9 " kg (185 lb)   BMI 30.79 kg/m    GENERAL: Patient is a pleasant, cooperative 36 year old female in no acute distress.  HEAD: Normocephalic, atraumatic.  Hair and scalp are normal.  EYES: Pupils are equal, round, reactive to light and accommodation.  Extraocular movements are intact.  The sclera nonicteric without injection.  The extraocular structures are normal.  EARS: Normal shape and symmetry.  No tenderness when palpating the mastoid or tragal areas bilaterally.  Otoscopic exam reveals a minimal amount of cerumen bilaterally.  The bilateral tympanic membranes are round, intact without evidence of effusion, good landmarks.  No retraction, granulation, or drainage.  NOSE: Nares are patent.  Nasal mucosa is boggy and inflamed with sticky, inflammatory mucus.  The patient has a leftward nasal septal deviation and severe/marked inferior turbinate hypertrophy left greater than right.  No nasal cavity masses, polyps, or mucopurulence on anterior rhinoscopy.  NEUROLOGIC: Cranial nerves II through XII are grossly intact.  Voice is strong.  Patient is House-Brackmann I/VI bilaterally.  CARDIOVASCULAR: Extremities are warm and well-perfused.  No significant peripheral edema.  RESPIRATORY: Patient has nonlabored breathing without cough, wheeze, stridor.  PSYCHIATRIC: Patient is alert and oriented.  Mood and affect appear normal.  SKIN: Warm and dry.  No scalp, face, or neck lesions noted.    Procedure: Flexible Nasal Endoscopy  Indication: Recurrent acute sinusitis, chronic sinusitis symptoms    To best visualize the sinonasal anatomy and due to the chief complaint and HPI, I proceeded with flexible fiberoptic nasal endoscopy.  The bilateral nasal cavities were anesthetized and decongested with a mixture of lidocaine and neosynephrine.  The bilateral nasal cavities were then examined using flexible fiberoptic nasal endoscope.  The right nasal cavity was without masses or polyps.  There was mucopurulent drainage coming from  the middle meatus trailing into the posterior nasopharynx on the right.  The right middle turbinate and middle meatus was normal in appearance.  The sphenoethmoid recess was clear.  The left nasal cavity was without masses, polyps, or mucopurulence.  The left middle turbinate and middle meatus was normal in appearance.  The sphenoethmoid recess was clear.  The sinonasal mucosa appeared boggy and inflamed with sticky, inflammatory mucus.  The nasal septum deviates to the left of the anterior and mid septum.  The nasopharynx had a normal appearance with normal Eustachian tube openings and fossa of Rosenmuller bilaterally.  Minimal adenoid tissue.  The scope was removed.  The patient tolerated the procedure well.    Assessment and Plan     ICD-10-CM    1. Chronic maxillary sinusitis  J32.0 NASAL ENDOSCOPY, DIAGNOSTIC     Asymptomatic COVID-19 Virus (Coronavirus) by PCR Nose     Case Request: FUNCTIONAL ENDOSCOPIC SINUS SURGERY, WITH NASAL SEPTOPLASTY   2. Chronic anterior ethmoidal sinusitis  J32.2 NASAL ENDOSCOPY, DIAGNOSTIC     Asymptomatic COVID-19 Virus (Coronavirus) by PCR Nose     Case Request: FUNCTIONAL ENDOSCOPIC SINUS SURGERY, WITH NASAL SEPTOPLASTY   3. History of acute sinusitis  Z87.09 NASAL ENDOSCOPY, DIAGNOSTIC     Asymptomatic COVID-19 Virus (Coronavirus) by PCR Nose     Case Request: FUNCTIONAL ENDOSCOPIC SINUS SURGERY, WITH NASAL SEPTOPLASTY   4. Nasal obstruction  J34.89 NASAL ENDOSCOPY, DIAGNOSTIC     Asymptomatic COVID-19 Virus (Coronavirus) by PCR Nose     Case Request: FUNCTIONAL ENDOSCOPIC SINUS SURGERY, WITH NASAL SEPTOPLASTY   5. Deviated nasal septum  J34.2 NASAL ENDOSCOPY, DIAGNOSTIC     Asymptomatic COVID-19 Virus (Coronavirus) by PCR Nose     Case Request: FUNCTIONAL ENDOSCOPIC SINUS SURGERY, WITH NASAL SEPTOPLASTY   6. Hypertrophy of both inferior nasal turbinates  J34.3 NASAL ENDOSCOPY, DIAGNOSTIC     Asymptomatic COVID-19 Virus (Coronavirus) by PCR Nose     Case Request: FUNCTIONAL  ENDOSCOPIC SINUS SURGERY, WITH NASAL SEPTOPLASTY   7. History of allergic rhinitis  Z87.09 NASAL ENDOSCOPY, DIAGNOSTIC     Asymptomatic COVID-19 Virus (Coronavirus) by PCR Nose     Case Request: FUNCTIONAL ENDOSCOPIC SINUS SURGERY, WITH NASAL SEPTOPLASTY     It was my pleasure seeing Teresa Salinas today in clinic.  The patient presents with chronic sinusitis symptoms in the setting of multiple episodes of recurrent acute sinusitis.  She is having at least 4 more episodes per year requiring treatment.  Continue to have problems despite medical management including topical nasal saline irrigations, topical nasal antihistamines, and topical nasal steroids.  She is also had allergy injection immunotherapy in the past but continues to have symptoms.  Based on her CT imaging and endoscopic findings today in clinic, she would be a good candidate for endoscopic sinus surgery.     We discussed the risks, benefits, alternatives, options of endonasal septoplasty, bilateral inferior turbinate reduction, bilateral endoscopic maxillary antrostomies, bilateral endoscopic anterior ethmoidectomies including, but not limited to: Risk of bleeding, risk of infection, risk of postoperative pain, risk of septal hematoma, risk of septal perforation, risk of CSF leak, risk of injury to the orbital contents, risk of intranasal scarring or adhesions, risk of smell disturbance or smell loss, potential need for additional procedures, risk of general anesthesia.  The patient voiced understanding and is willing to proceed.  We discussed the postoperative course and convalescence including lifting and activity restrictions, placement of nasal splints, nasal saline irrigations postoperatively, postoperative debridement, and follow-up.    Miguel Angel Marcano MD  Department of Otolaryngology-Head and Neck Surgery  [facility]         Again, thank you for allowing me to participate in the care of your patient.        Sincerely,        Miguel Angel HENDRICKSON  MD Jeet

## 2021-12-09 NOTE — NURSING NOTE
"Initial /76 (BP Location: Right arm, Patient Position: Sitting, Cuff Size: Adult Large)   Pulse 70   Temp (!) 96.1  F (35.6  C) (Tympanic)   Ht 1.651 m (5' 5\")   Wt 83.9 kg (185 lb)   BMI 30.79 kg/m   Estimated body mass index is 30.79 kg/m  as calculated from the following:    Height as of this encounter: 1.651 m (5' 5\").    Weight as of this encounter: 83.9 kg (185 lb). .    Bryanna Vargas LPN on 12/9/2021 at 2:21 PM    "

## 2021-12-24 DIAGNOSIS — Z11.59 ENCOUNTER FOR SCREENING FOR OTHER VIRAL DISEASES: ICD-10-CM

## 2021-12-30 ENCOUNTER — PRENATAL OFFICE VISIT (OUTPATIENT)
Dept: OBGYN | Facility: CLINIC | Age: 36
End: 2021-12-30

## 2021-12-30 ENCOUNTER — APPOINTMENT (OUTPATIENT)
Dept: OBGYN | Facility: CLINIC | Age: 36
End: 2021-12-30
Payer: COMMERCIAL

## 2021-12-30 DIAGNOSIS — Z34.80 PRENATAL CARE, SUBSEQUENT PREGNANCY: ICD-10-CM

## 2021-12-30 PROCEDURE — 99207 PR NO CHARGE NURSE ONLY: CPT | Performed by: OBSTETRICS & GYNECOLOGY
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
## 2022-01-12 ENCOUNTER — PRENATAL OFFICE VISIT (OUTPATIENT)
Dept: OBGYN | Facility: CLINIC | Age: 37
End: 2022-01-12
Payer: COMMERCIAL

## 2022-01-12 VITALS
RESPIRATION RATE: 14 BRPM | DIASTOLIC BLOOD PRESSURE: 73 MMHG | WEIGHT: 182 LBS | BODY MASS INDEX: 30.32 KG/M2 | SYSTOLIC BLOOD PRESSURE: 117 MMHG | HEART RATE: 70 BPM | TEMPERATURE: 98.1 F | HEIGHT: 65 IN

## 2022-01-12 DIAGNOSIS — Z67.91 RH NEGATIVE STATE IN ANTEPARTUM PERIOD: ICD-10-CM

## 2022-01-12 DIAGNOSIS — O26.899 RH NEGATIVE STATE IN ANTEPARTUM PERIOD: ICD-10-CM

## 2022-01-12 DIAGNOSIS — Z34.81 PRENATAL CARE, SUBSEQUENT PREGNANCY IN FIRST TRIMESTER: Primary | ICD-10-CM

## 2022-01-12 PROBLEM — Z30.017 NEXPLANON INSERTION: Status: RESOLVED | Noted: 2019-10-31 | Resolved: 2022-01-12

## 2022-01-12 LAB
ALBUMIN UR-MCNC: NEGATIVE MG/DL
APPEARANCE UR: CLEAR
BILIRUB UR QL STRIP: NEGATIVE
COLOR UR AUTO: YELLOW
GLUCOSE UR STRIP-MCNC: NEGATIVE MG/DL
HGB UR QL STRIP: NEGATIVE
KETONES UR STRIP-MCNC: NEGATIVE MG/DL
LEUKOCYTE ESTERASE UR QL STRIP: NEGATIVE
NITRATE UR QL: NEGATIVE
PH UR STRIP: 5.5 [PH] (ref 5–7)
RBC #/AREA URNS AUTO: ABNORMAL /HPF
SP GR UR STRIP: >=1.03 (ref 1–1.03)
SQUAMOUS #/AREA URNS AUTO: ABNORMAL /LPF
URATE CRY #/AREA URNS HPF: ABNORMAL /HPF
UROBILINOGEN UR STRIP-ACNC: 0.2 E.U./DL
WBC #/AREA URNS AUTO: ABNORMAL /HPF

## 2022-01-12 PROCEDURE — 87086 URINE CULTURE/COLONY COUNT: CPT | Performed by: OBSTETRICS & GYNECOLOGY

## 2022-01-12 PROCEDURE — 87088 URINE BACTERIA CULTURE: CPT | Performed by: OBSTETRICS & GYNECOLOGY

## 2022-01-12 PROCEDURE — 76817 TRANSVAGINAL US OBSTETRIC: CPT | Performed by: OBSTETRICS & GYNECOLOGY

## 2022-01-12 PROCEDURE — 87591 N.GONORRHOEAE DNA AMP PROB: CPT | Performed by: OBSTETRICS & GYNECOLOGY

## 2022-01-12 PROCEDURE — 81001 URINALYSIS AUTO W/SCOPE: CPT | Performed by: OBSTETRICS & GYNECOLOGY

## 2022-01-12 PROCEDURE — 87491 CHLMYD TRACH DNA AMP PROBE: CPT | Performed by: OBSTETRICS & GYNECOLOGY

## 2022-01-12 PROCEDURE — 99213 OFFICE O/P EST LOW 20 MIN: CPT | Mod: 25 | Performed by: OBSTETRICS & GYNECOLOGY

## 2022-01-12 ASSESSMENT — MIFFLIN-ST. JEOR: SCORE: 1526.43

## 2022-01-12 NOTE — NURSING NOTE
"Initial /73 (BP Location: Left arm, Patient Position: Sitting, Cuff Size: Adult Regular)   Pulse 70   Temp 98.1  F (36.7  C) (Tympanic)   Resp 14   Ht 1.651 m (5' 5\")   Wt 82.6 kg (182 lb)   LMP 11/13/2021   BMI 30.29 kg/m   Estimated body mass index is 30.29 kg/m  as calculated from the following:    Height as of this encounter: 1.651 m (5' 5\").    Weight as of this encounter: 82.6 kg (182 lb). .      "

## 2022-01-12 NOTE — PROGRESS NOTES
[facility]    New OB Visit Note    CC: New OB     Subjective:    Teresa is a 36 year old  at 8w4d by LMP who presents for her initial OB visit. This is a planned pregnancy and her and her partner are excited. She got her Nexplanon out in mid November and conceived right away. Thinks she might be a little earlier as she didn't get a positive pregnancy test until 11 days after her missed menses. She had very light spotting around time just after supposed ovulation. She reports feeling fairly well. Reports some nausea but no vomiting. Has still been able to eat and drink normally.        Past OB History:       OB History    Para Term  AB Living   2 1 1 0 0 1   SAB IAB Ectopic Multiple Live Births   0 0 0 0 1      # Outcome Date GA Lbr Krishna/2nd Weight Sex Delivery Anes PTL Lv   2 Current            1 Term 19 40w0d 11:00 / 04:13 3.997 kg (8 lb 13 oz) M Vag-Spont EPI N AUSTIN      Complications: GBS      Name: Adan      Apgar1: 8  Apgar5: 9       Past Medical History:   Diagnosis Date     Allergic rhinitis 3/11/2014     Asthma 3/11/2014     Chickenpox      Depression      Family history of thyroid disease 2015       Past Surgical History:   Procedure Laterality Date     Arthoscopic right ankle Right      OSTEOTOMY ANKLE Right        Current Outpatient Medications   Medication Sig Dispense Refill     cetirizine (ZYRTEC) 10 MG tablet Take 10 mg by mouth daily  30 tablet 1     Cholecalciferol (VITAMIN D) 2000 UNITS tablet Take 2,000 Units by mouth daily 100 tablet 3     famotidine (PEPCID) 20 MG tablet Take 20 mg by mouth daily       Prenatal Vit-Fe Fumarate-FA (PRENATAL MULTIVITAMIN W/IRON) 27-0.8 MG tablet Take 1 tablet by mouth daily         Family History   Problem Relation Age of Onset     Hyperlipidemia Mother      Thyroid Disease Mother      Seasonal/Environmental Allergies Mother      Diabetes Father         typeII     Hypertension Father       "Hyperlipidemia Father      Depression Father      Seasonal/Environmental Allergies Father      Asthma Sister      Seasonal/Environmental Allergies Sister      Other - See Comments Sister         May-Thurner syndrome     Thyroid Disease Sister      Hyperlipidemia Maternal Grandmother      Colon Cancer Maternal Grandfather      Chronic Obstructive Pulmonary Disease Paternal Grandmother      Esophageal Cancer Paternal Grandfather        Social History     Tobacco Use     Smoking status: Never Smoker     Smokeless tobacco: Never Used   Substance Use Topics     Alcohol use: No     Alcohol/week: 0.0 standard drinks       ROS: A 10 pt ROS was completed and found to be negative unless mentioned in the HPI.     Objective:    /73 (BP Location: Left arm, Patient Position: Sitting, Cuff Size: Adult Regular)   Pulse 70   Temp 98.1  F (36.7  C) (Tympanic)   Resp 14   Ht 1.651 m (5' 5\")   Wt 82.6 kg (182 lb)   LMP 11/13/2021   BMI 30.29 kg/m      Estimated body mass index is 30.29 kg/m  as calculated from the following:    Height as of this encounter: 1.651 m (5' 5\").    Weight as of this encounter: 82.6 kg (182 lb).    General appearance: well-hydrated, A&O x 3, no apparent distress  Lungs: Equal expansion bilaterally, no accessory muscle use  Heart: No heaves or thrills. No peripheral varicosities  Constitutional: See vitals  Abdomen: Soft, non-tender, non-distended. No rebound, rigidity, or guarding.  Extremities: no edema  Genitourinary:  External genitalia: no erythema, no lesions.   Anus and Perineum: Unremarkable, no visible lesions  Vagina: Normal, healthy pink mucosa without any lesions. Physiologic vaginal discharge.   Cervix: no cervical motion tenderness.   Uterus: retroverted  Adnexa: no masses or tenderness bilaterally.    Bedside Ultrasound    Transvaginal ultrasound was performed. Uterus is retroverted. Intrauterine gestational sac present measuring 1.42cm x 1.28cm x 1.26cm. Yolk sac present. Possible " start of early fetal pole but not measurable. Right ovary with simple appearing corpus luteum cyst measuring 2.24cm x 1.0cm.     Impression: Likely early pregnancy but can not exclude early pregnancy loss. Needs follow up US in 10-14 days to confirm viability.         Assessment and Plan:     Encounter Diagnoses   Name Primary?     Prenatal care, subsequent pregnancy in first trimester Yes     Rh negative state in antepartum period        36 year old  at unknown gestational age who presents for her initial OB visit. Ultrasound today showing early gestational sac with yolk sac, no confirmed fetal pole. Suspect early pregnancy as patient had Nexplanon removed and conceived right after. Positive pregnancy test was late. Will need repeat ultrasound in 10-14 days. Offered beta hcg draws, will defer for now. Discussed miscarriage precautions.     Return to clinic in 10-14 days.     Ester Watkins,

## 2022-01-13 LAB
C TRACH DNA SPEC QL PROBE+SIG AMP: NEGATIVE
N GONORRHOEA DNA SPEC QL NAA+PROBE: NEGATIVE

## 2022-01-14 LAB
BACTERIA UR CULT: ABNORMAL
BACTERIA UR CULT: ABNORMAL

## 2022-01-26 ENCOUNTER — PRENATAL OFFICE VISIT (OUTPATIENT)
Dept: OBGYN | Facility: CLINIC | Age: 37
End: 2022-01-26
Payer: COMMERCIAL

## 2022-01-26 VITALS
DIASTOLIC BLOOD PRESSURE: 66 MMHG | TEMPERATURE: 97.2 F | RESPIRATION RATE: 12 BRPM | SYSTOLIC BLOOD PRESSURE: 104 MMHG | BODY MASS INDEX: 29.92 KG/M2 | WEIGHT: 179.6 LBS | HEART RATE: 76 BPM | HEIGHT: 65 IN

## 2022-01-26 DIAGNOSIS — Z34.81 PRENATAL CARE, SUBSEQUENT PREGNANCY IN FIRST TRIMESTER: Primary | ICD-10-CM

## 2022-01-26 DIAGNOSIS — O21.9 NAUSEA/VOMITING IN PREGNANCY: ICD-10-CM

## 2022-01-26 LAB
ABO/RH(D): NORMAL
ANTIBODY SCREEN: NEGATIVE
ERYTHROCYTE [DISTWIDTH] IN BLOOD BY AUTOMATED COUNT: 13 % (ref 10–15)
HBV SURFACE AG SERPL QL IA: NONREACTIVE
HCT VFR BLD AUTO: 40.9 % (ref 35–47)
HCV AB SERPL QL IA: NONREACTIVE
HGB BLD-MCNC: 13.6 G/DL (ref 11.7–15.7)
HIV 1+2 AB+HIV1 P24 AG SERPL QL IA: NONREACTIVE
MCH RBC QN AUTO: 28.5 PG (ref 26.5–33)
MCHC RBC AUTO-ENTMCNC: 33.3 G/DL (ref 31.5–36.5)
MCV RBC AUTO: 86 FL (ref 78–100)
PLATELET # BLD AUTO: 230 10E3/UL (ref 150–450)
RBC # BLD AUTO: 4.78 10E6/UL (ref 3.8–5.2)
RUBV IGG SERPL QL IA: 3.19 INDEX
RUBV IGG SERPL QL IA: POSITIVE
SPECIMEN EXPIRATION DATE: NORMAL
T PALLIDUM AB SER QL: NONREACTIVE
WBC # BLD AUTO: 7 10E3/UL (ref 4–11)

## 2022-01-26 PROCEDURE — 86850 RBC ANTIBODY SCREEN: CPT | Performed by: OBSTETRICS & GYNECOLOGY

## 2022-01-26 PROCEDURE — 85027 COMPLETE CBC AUTOMATED: CPT | Performed by: OBSTETRICS & GYNECOLOGY

## 2022-01-26 PROCEDURE — 87340 HEPATITIS B SURFACE AG IA: CPT | Performed by: OBSTETRICS & GYNECOLOGY

## 2022-01-26 PROCEDURE — 86803 HEPATITIS C AB TEST: CPT | Performed by: OBSTETRICS & GYNECOLOGY

## 2022-01-26 PROCEDURE — 87389 HIV-1 AG W/HIV-1&-2 AB AG IA: CPT | Performed by: OBSTETRICS & GYNECOLOGY

## 2022-01-26 PROCEDURE — 76817 TRANSVAGINAL US OBSTETRIC: CPT | Performed by: OBSTETRICS & GYNECOLOGY

## 2022-01-26 PROCEDURE — 86900 BLOOD TYPING SEROLOGIC ABO: CPT | Performed by: OBSTETRICS & GYNECOLOGY

## 2022-01-26 PROCEDURE — 86901 BLOOD TYPING SEROLOGIC RH(D): CPT | Performed by: OBSTETRICS & GYNECOLOGY

## 2022-01-26 PROCEDURE — 99207 PR FIRST OB VISIT: CPT | Performed by: OBSTETRICS & GYNECOLOGY

## 2022-01-26 PROCEDURE — 36415 COLL VENOUS BLD VENIPUNCTURE: CPT | Performed by: OBSTETRICS & GYNECOLOGY

## 2022-01-26 PROCEDURE — 86762 RUBELLA ANTIBODY: CPT | Performed by: OBSTETRICS & GYNECOLOGY

## 2022-01-26 PROCEDURE — 86780 TREPONEMA PALLIDUM: CPT | Performed by: OBSTETRICS & GYNECOLOGY

## 2022-01-26 RX ORDER — ONDANSETRON 4 MG/1
4 TABLET, ORALLY DISINTEGRATING ORAL EVERY 8 HOURS PRN
Qty: 20 TABLET | Refills: 3 | Status: SHIPPED | OUTPATIENT
Start: 2022-01-26 | End: 2022-06-15

## 2022-01-26 ASSESSMENT — MIFFLIN-ST. JEOR: SCORE: 1515.54

## 2022-01-26 NOTE — PROGRESS NOTES
[facility]    New OB Visit Note    CC: New OB     Subjective:    Teresa is a 36 year old  at 8w0d by US today, who presents for her initial OB visit. This is a planned pregnancy and her and her  are excited. She reports feeling well. Denies any uterine cramping, abdominal pain or vaginal bleeding. Reports nausea and vomiting, having difficulty getting through the days due to feeling sick. Is still able to keep some food and liquids down.     Past OB History:     OB History    Para Term  AB Living   2 1 1 0 0 1   SAB IAB Ectopic Multiple Live Births   0 0 0 0 1      # Outcome Date GA Lbr Krishna/2nd Weight Sex Delivery Anes PTL Lv   2 Current            1 Term 19 40w0d 11:00 / 04:13 3.997 kg (8 lb 13 oz) M Vag-Spont EPI N AUSTIN      Complications: GBS      Name: Adan      Apgar1: 8  Apgar5: 9       Past Medical History:   Diagnosis Date     Allergic rhinitis 3/11/2014     Asthma 3/11/2014     Chickenpox      Depression      Family history of thyroid disease 2015       Past Surgical History:   Procedure Laterality Date     Arthoscopic right ankle Right      OSTEOTOMY ANKLE Right        Current Outpatient Medications   Medication Sig Dispense Refill     cetirizine (ZYRTEC) 10 MG tablet Take 10 mg by mouth daily  30 tablet 1     Cholecalciferol (VITAMIN D) 2000 UNITS tablet Take 2,000 Units by mouth daily 100 tablet 3     famotidine (PEPCID) 20 MG tablet Take 20 mg by mouth daily       ondansetron (ZOFRAN-ODT) 4 MG ODT tab Take 1 tablet (4 mg) by mouth every 8 hours as needed for nausea 20 tablet 3     Prenatal Vit-Fe Fumarate-FA (PRENATAL MULTIVITAMIN W/IRON) 27-0.8 MG tablet Take 1 tablet by mouth daily         Family History   Problem Relation Age of Onset     Hyperlipidemia Mother      Thyroid Disease Mother      Seasonal/Environmental Allergies Mother      Diabetes Father         typeII     Hypertension Father      Hyperlipidemia Father       "Depression Father      Seasonal/Environmental Allergies Father      Asthma Sister      Seasonal/Environmental Allergies Sister      Other - See Comments Sister         May-Thurner syndrome     Thyroid Disease Sister      Hyperlipidemia Maternal Grandmother      Colon Cancer Maternal Grandfather      Chronic Obstructive Pulmonary Disease Paternal Grandmother      Esophageal Cancer Paternal Grandfather        Social History     Tobacco Use     Smoking status: Never Smoker     Smokeless tobacco: Never Used   Substance Use Topics     Alcohol use: No     Alcohol/week: 0.0 standard drinks       ROS: A 10 pt ROS was completed and found to be negative unless mentioned in the HPI.     Objective:    /66 (BP Location: Left arm, Patient Position: Sitting, Cuff Size: Adult Regular)   Pulse 76   Temp 97.2  F (36.2  C) (Tympanic)   Resp 12   Ht 1.651 m (5' 5\")   Wt 81.5 kg (179 lb 9.6 oz)   LMP 2021   BMI 29.89 kg/m      Estimated body mass index is 29.89 kg/m  as calculated from the following:    Height as of this encounter: 1.651 m (5' 5\").    Weight as of this encounter: 81.5 kg (179 lb 9.6 oz).    General appearance: well-hydrated, A&O x 3, no apparent distress  Lungs: Equal expansion bilaterally, no accessory muscle use  Heart: No heaves or thrills.  Constitutional: See vitals  Extremities: no edema  Genitourinary:  External genitalia: no erythema, no lesions.   Cervix: no cervical motion tenderness.   Uterus: gravid    Bedside Ultrasound    Transvaginal ultrasound was performed. A single live intrauterine pregnancy was seen.      CRL= 1.64 cm   FHT= 181 bpm  EGA= 8 weeks 0 days  EDC based on US = 22  EDC by LMP= 22  FINAL EDC= 22    Assessment and Plan:     Encounter Diagnoses   Name Primary?     Prenatal care, subsequent pregnancy in first trimester Yes     Nausea/vomiting in pregnancy        36 year old  at 8w0d by US today who presents for her initial OB visit.     1) Plan to draw " new OB labs today. Urine and pelvic cultures collected last visit.   2) Discussed routine prenatal care, group practice model at [facility], tertiary support and frequency of visits. Also discussed options for genetic screening tests. Patient declines genetic testing.   3) Dating/viability US performed today, dating by US due to LMP discrepancy   4) Concerns: Nausea/vomiting: Rx for Zofran sent today  5) PMH/OBHx problems: None. +GBS in urine, will need antibiotics in labor. Rh negative ( also negative), likely will still plan for prophylactic dose at 28 weeks.   6) Medication review: no changes, continue prenatal vitamin   7) Reviewed miscarriage precautions (present to ED or call clinic with abdominal pain, vaginal bleeding or fever)   8) Weight gain: discussed weight gain expectations (BMI <18.5: 28-40lbs, BMI 18.5-25: 25-35lbs, BMI 25-30: 15-25lbs, BMI >30: 11-20lbs)   9) PAP smear: up to date  10) Delivery plan: continue to discuss route of delivery, breastfeeding, pp contraception, pain management in labor  11) Immunizations: Tdap at 28wks, s/p COVID vaccine and booster as well as annual flu shot  12) No increased risk for gestational diabetes, plan for screen at 28wks     Return to clinic in 4 weeks.     Ester Watkins DO

## 2022-01-26 NOTE — NURSING NOTE
"Initial /66 (BP Location: Left arm, Patient Position: Sitting, Cuff Size: Adult Regular)   Pulse 76   Temp 97.2  F (36.2  C) (Tympanic)   Resp 12   Ht 1.651 m (5' 5\")   Wt 81.5 kg (179 lb 9.6 oz)   LMP 11/13/2021   BMI 29.89 kg/m   Estimated body mass index is 29.89 kg/m  as calculated from the following:    Height as of this encounter: 1.651 m (5' 5\").    Weight as of this encounter: 81.5 kg (179 lb 9.6 oz). .      "

## 2022-02-12 ENCOUNTER — HEALTH MAINTENANCE LETTER (OUTPATIENT)
Age: 37
End: 2022-02-12

## 2022-02-23 ENCOUNTER — PRENATAL OFFICE VISIT (OUTPATIENT)
Dept: OBGYN | Facility: CLINIC | Age: 37
End: 2022-02-23
Payer: COMMERCIAL

## 2022-02-23 VITALS
BODY MASS INDEX: 29.96 KG/M2 | TEMPERATURE: 97.8 F | RESPIRATION RATE: 16 BRPM | WEIGHT: 179.8 LBS | SYSTOLIC BLOOD PRESSURE: 132 MMHG | HEIGHT: 65 IN | DIASTOLIC BLOOD PRESSURE: 83 MMHG | HEART RATE: 88 BPM

## 2022-02-23 DIAGNOSIS — Z34.81 PRENATAL CARE, SUBSEQUENT PREGNANCY IN FIRST TRIMESTER: Primary | ICD-10-CM

## 2022-02-23 PROCEDURE — 99207 PR PRENATAL VISIT: CPT | Performed by: OBSTETRICS & GYNECOLOGY

## 2022-02-23 RX ORDER — POLYETHYLENE GLYCOL 3350 17 G/17G
1 POWDER, FOR SOLUTION ORAL DAILY
COMMUNITY
End: 2022-06-15

## 2022-02-23 NOTE — PROGRESS NOTES
"[facility]     New OB Visit Note     CC: New OB      Subjective:     Teresa is a 36 year old  at 12w0d by 8wk US. N/v getting better. No VB.      Objective:  /83 (BP Location: Left arm, Patient Position: Chair, Cuff Size: Adult Large)   Pulse 88   Temp 97.8  F (36.6  C) (Tympanic)   Resp 16   Ht 1.651 m (5' 5\")   Wt 81.6 kg (179 lb 12.8 oz)   LMP 2021 (Approximate)   BMI 29.92 kg/m       General appearance: well-hydrated, A&O x 3, no apparent distress  Lungs: Equal expansion bilaterally, no accessory muscle use  Heart: No heaves or thrills.  Constitutional: See vitals  Extremities: no edema  Genitourinary:  External genitalia: no erythema, no lesions.   Cervix: no cervical motion tenderness.   Uterus: gravid    Transabdominal US: performed as I was unable to illicit FHT by handheld doppler  Shultz IUP at 12w2d, +FHT at 188 bpm, normal fluid and fetal movement            Assessment and Plan:   36 year old  at 12w0d by 8w0d     1) Labs nl  2) Rh neg  3) s/p COVID, booster and flu  4) Declines genetics     Return to clinic in 4 weeks.      Uma Watkins MD  OB/GYN     "

## 2022-02-23 NOTE — PROGRESS NOTES
"Initial /83 (BP Location: Left arm, Patient Position: Chair, Cuff Size: Adult Large)   Pulse 88   Temp 97.8  F (36.6  C) (Tympanic)   Resp 16   Ht 1.651 m (5' 5\")   Wt 81.6 kg (179 lb 12.8 oz)   LMP 11/13/2021 (Approximate)   BMI 29.92 kg/m   Estimated body mass index is 29.92 kg/m  as calculated from the following:    Height as of this encounter: 1.651 m (5' 5\").    Weight as of this encounter: 81.6 kg (179 lb 12.8 oz). .      "

## 2022-03-23 ENCOUNTER — PRENATAL OFFICE VISIT (OUTPATIENT)
Dept: OBGYN | Facility: CLINIC | Age: 37
End: 2022-03-23
Payer: COMMERCIAL

## 2022-03-23 ENCOUNTER — TRANSCRIBE ORDERS (OUTPATIENT)
Dept: MATERNAL FETAL MEDICINE | Facility: HOSPITAL | Age: 37
End: 2022-03-23

## 2022-03-23 VITALS
BODY MASS INDEX: 29.96 KG/M2 | WEIGHT: 179.8 LBS | HEART RATE: 72 BPM | HEIGHT: 65 IN | TEMPERATURE: 97 F | RESPIRATION RATE: 16 BRPM | DIASTOLIC BLOOD PRESSURE: 67 MMHG | SYSTOLIC BLOOD PRESSURE: 105 MMHG

## 2022-03-23 DIAGNOSIS — O09.522 MULTIGRAVIDA OF ADVANCED MATERNAL AGE IN SECOND TRIMESTER: Primary | ICD-10-CM

## 2022-03-23 DIAGNOSIS — O26.90 PREGNANCY RELATED CONDITION: Primary | ICD-10-CM

## 2022-03-23 PROCEDURE — 99207 PR PRENATAL VISIT: CPT | Performed by: OBSTETRICS & GYNECOLOGY

## 2022-03-23 NOTE — PROGRESS NOTES
"CC: Here for routine prenatal visit @ 16w0d   HPI:  No fetal movement yet; declines NIPT testing; amenable to level 2 ultrasound due to AMA    PE: /67 (BP Location: Right arm, Patient Position: Chair, Cuff Size: Adult Regular)   Pulse 72   Temp 97  F (36.1  C) (Tympanic)   Resp 16   Ht 1.651 m (5' 5\")   Wt 81.6 kg (179 lb 12.8 oz)   LMP 11/13/2021 (Approximate)   Breastfeeding No   BMI 29.92 kg/m     See OB flowsheet      A:  1. Multigravida of advanced maternal age in second trimester    - [facility] Referral - Pregnancy; Future      Routine prenatal care  RTC 4 weeks.      Magnolia Herrera M.D.     "

## 2022-03-23 NOTE — NURSING NOTE
"Initial /67 (BP Location: Right arm, Patient Position: Chair, Cuff Size: Adult Regular)   Pulse 72   Temp 97  F (36.1  C) (Tympanic)   Resp 16   Ht 1.651 m (5' 5\")   Wt 81.6 kg (179 lb 12.8 oz)   LMP 11/13/2021 (Approximate)   Breastfeeding No   BMI 29.92 kg/m   Estimated body mass index is 29.92 kg/m  as calculated from the following:    Height as of this encounter: 1.651 m (5' 5\").    Weight as of this encounter: 81.6 kg (179 lb 12.8 oz). .    Alma Rosa Laughlin CMA    "

## 2022-04-07 ENCOUNTER — PRE VISIT (OUTPATIENT)
Dept: MATERNAL FETAL MEDICINE | Facility: HOSPITAL | Age: 37
End: 2022-04-07
Payer: COMMERCIAL

## 2022-04-14 ENCOUNTER — ANCILLARY PROCEDURE (OUTPATIENT)
Dept: ULTRASOUND IMAGING | Facility: HOSPITAL | Age: 37
End: 2022-04-14
Attending: OBSTETRICS & GYNECOLOGY
Payer: COMMERCIAL

## 2022-04-14 ENCOUNTER — OFFICE VISIT (OUTPATIENT)
Dept: MATERNAL FETAL MEDICINE | Facility: HOSPITAL | Age: 37
End: 2022-04-14
Attending: OBSTETRICS & GYNECOLOGY
Payer: COMMERCIAL

## 2022-04-14 DIAGNOSIS — O09.522 MULTIGRAVIDA OF ADVANCED MATERNAL AGE IN SECOND TRIMESTER: Primary | ICD-10-CM

## 2022-04-14 DIAGNOSIS — O36.8390 FETAL ARRHYTHMIA AFFECTING PREGNANCY, ANTEPARTUM: ICD-10-CM

## 2022-04-14 DIAGNOSIS — O35.03X0 CHOROID PLEXUS CYST OF FETUS AFFECTING CARE OF MOTHER, ANTEPARTUM, SINGLE OR UNSPECIFIED FETUS: ICD-10-CM

## 2022-04-14 DIAGNOSIS — O43.112 CIRCUMVALLATE PLACENTA DURING PREGNANCY IN SECOND TRIMESTER, ANTEPARTUM: ICD-10-CM

## 2022-04-14 DIAGNOSIS — O26.90 PREGNANCY RELATED CONDITION: ICD-10-CM

## 2022-04-14 PROCEDURE — 76811 OB US DETAILED SNGL FETUS: CPT

## 2022-04-14 PROCEDURE — 76820 UMBILICAL ARTERY ECHO: CPT | Mod: 26 | Performed by: OBSTETRICS & GYNECOLOGY

## 2022-04-14 PROCEDURE — 76811 OB US DETAILED SNGL FETUS: CPT | Mod: 26 | Performed by: OBSTETRICS & GYNECOLOGY

## 2022-04-14 PROCEDURE — 99203 OFFICE O/P NEW LOW 30 MIN: CPT | Mod: 25 | Performed by: OBSTETRICS & GYNECOLOGY

## 2022-04-19 ENCOUNTER — PRENATAL OFFICE VISIT (OUTPATIENT)
Dept: OBGYN | Facility: CLINIC | Age: 37
End: 2022-04-19
Payer: COMMERCIAL

## 2022-04-19 VITALS
DIASTOLIC BLOOD PRESSURE: 70 MMHG | SYSTOLIC BLOOD PRESSURE: 112 MMHG | BODY MASS INDEX: 29.77 KG/M2 | TEMPERATURE: 97.6 F | RESPIRATION RATE: 12 BRPM | HEIGHT: 65 IN | WEIGHT: 178.7 LBS | HEART RATE: 89 BPM

## 2022-04-19 DIAGNOSIS — O36.8390 IRREGULAR FETAL HEART RATE: ICD-10-CM

## 2022-04-19 DIAGNOSIS — Z34.80 PRENATAL CARE OF MULTIGRAVIDA, ANTEPARTUM: Primary | ICD-10-CM

## 2022-04-19 PROCEDURE — 99207 PR PRENATAL VISIT: CPT | Performed by: ADVANCED PRACTICE MIDWIFE

## 2022-04-19 NOTE — NURSING NOTE
"Initial /70 (BP Location: Right arm, Patient Position: Sitting, Cuff Size: Adult Regular)   Pulse 89   Temp 97.6  F (36.4  C) (Tympanic)   Resp 12   Ht 1.651 m (5' 5\")   Wt 81.1 kg (178 lb 11.2 oz)   LMP 11/13/2021 (Approximate)   BMI 29.74 kg/m   Estimated body mass index is 29.74 kg/m  as calculated from the following:    Height as of this encounter: 1.651 m (5' 5\").    Weight as of this encounter: 81.1 kg (178 lb 11.2 oz). .      "

## 2022-04-19 NOTE — PROGRESS NOTES
Feeling well.  Baby is active. Denies any leaking of fluid, vaginal bleeding, regular uterine contractions, or headaches or other concerns.  Irregular fetal heart rate.  Monitored for 5 minutes per the instruction of MF.  FHR 150s.  No Tachycardia heard.  3 PACs auscultated with regular heart rate in between irregular rate.  She will return for monitoring in 1 week per instructions of MF. The following week she will see [facility] for assessment.   She has also cut down caffeine.    Reviewed weight.  She is still having nausea and vomiting.  But able to hold down some food and fluids.  We discussed the importance of staying hydrated.    Reviewed to call for contractions, loss of fluid, vaginal bleeding, decreased fetal movement or any other questions or concerns.    RTC in 1 week due to irregular fetal heart rate.  Raquel Braga, HASEEB, APRN, CNM

## 2022-04-27 ENCOUNTER — PRENATAL OFFICE VISIT (OUTPATIENT)
Dept: OBGYN | Facility: CLINIC | Age: 37
End: 2022-04-27
Payer: COMMERCIAL

## 2022-04-27 VITALS
HEIGHT: 65 IN | SYSTOLIC BLOOD PRESSURE: 106 MMHG | HEART RATE: 86 BPM | RESPIRATION RATE: 16 BRPM | WEIGHT: 179.2 LBS | TEMPERATURE: 98 F | BODY MASS INDEX: 29.85 KG/M2 | DIASTOLIC BLOOD PRESSURE: 56 MMHG

## 2022-04-27 DIAGNOSIS — K21.00 GASTROESOPHAGEAL REFLUX DISEASE WITH ESOPHAGITIS WITHOUT HEMORRHAGE: ICD-10-CM

## 2022-04-27 DIAGNOSIS — M79.645 THUMB PAIN, LEFT: ICD-10-CM

## 2022-04-27 DIAGNOSIS — Z34.82 ENCOUNTER FOR SUPERVISION OF OTHER NORMAL PREGNANCY IN SECOND TRIMESTER: Primary | ICD-10-CM

## 2022-04-27 PROCEDURE — 99207 PR PRENATAL VISIT: CPT | Performed by: OBSTETRICS & GYNECOLOGY

## 2022-04-27 RX ORDER — RABEPRAZOLE SODIUM 20 MG/1
20 TABLET, DELAYED RELEASE ORAL DAILY
Qty: 30 TABLET | Refills: 3 | Status: SHIPPED | OUTPATIENT
Start: 2022-04-27 | End: 2022-07-27

## 2022-04-27 NOTE — PROGRESS NOTES
Concerns today: monitored with doppler for 2 minutes , no PAC's noted  No nausea/vomiting. No heartburn.  No vaginal bleeding, no contractions/severe cramping, no leakage of fluid.  No vaginal discharge. No dysuria  No headache, vision changes, lower extremity swelling, upper abdominal pain, chest pain, shortness of breath.   Reportable signs and symptoms discussed.  Has MFM follow up on May 4   Gerd unresponsive to Tums, minimally responsive to pepcid, had usec Ranitidine in the past  Aciphex ordered and requires a PA- to be initiated by the Pharmacy here    Nicolas Turner MD

## 2022-05-04 ENCOUNTER — OFFICE VISIT (OUTPATIENT)
Dept: MATERNAL FETAL MEDICINE | Facility: HOSPITAL | Age: 37
End: 2022-05-04
Attending: OBSTETRICS & GYNECOLOGY
Payer: COMMERCIAL

## 2022-05-04 ENCOUNTER — ANCILLARY PROCEDURE (OUTPATIENT)
Dept: ULTRASOUND IMAGING | Facility: HOSPITAL | Age: 37
End: 2022-05-04
Attending: OBSTETRICS & GYNECOLOGY
Payer: COMMERCIAL

## 2022-05-04 DIAGNOSIS — O43.112 CIRCUMVALLATE PLACENTA IN SECOND TRIMESTER: ICD-10-CM

## 2022-05-04 DIAGNOSIS — O09.522 MULTIGRAVIDA OF ADVANCED MATERNAL AGE IN SECOND TRIMESTER: ICD-10-CM

## 2022-05-04 DIAGNOSIS — Z03.73 FETAL ANOMALY SUSPECTED BUT NOT FOUND: Primary | ICD-10-CM

## 2022-05-04 PROCEDURE — 99207 PR NO CHARGE LOS: CPT | Performed by: OBSTETRICS & GYNECOLOGY

## 2022-05-04 PROCEDURE — 76816 OB US FOLLOW-UP PER FETUS: CPT

## 2022-05-04 PROCEDURE — 76816 OB US FOLLOW-UP PER FETUS: CPT | Mod: 26 | Performed by: OBSTETRICS & GYNECOLOGY

## 2022-05-04 NOTE — PROGRESS NOTES
Please see the imaging tab for details of the ultrasound performed today.    Ele Hein MD  Specialist in Maternal-Fetal Medicine

## 2022-05-05 PROBLEM — O43.112 CIRCUMVALLATE PLACENTA IN SECOND TRIMESTER: Status: ACTIVE | Noted: 2022-05-05

## 2022-05-10 ENCOUNTER — MYC MEDICAL ADVICE (OUTPATIENT)
Dept: FAMILY MEDICINE | Facility: CLINIC | Age: 37
End: 2022-05-10
Payer: COMMERCIAL

## 2022-05-10 DIAGNOSIS — H35.40 PERIPHERAL RETINAL THINNING: Primary | ICD-10-CM

## 2022-05-11 ENCOUNTER — HOSPITAL ENCOUNTER (OUTPATIENT)
Dept: OCCUPATIONAL THERAPY | Facility: CLINIC | Age: 37
Setting detail: THERAPIES SERIES
Discharge: HOME | End: 2022-05-11
Attending: OBSTETRICS & GYNECOLOGY
Payer: COMMERCIAL

## 2022-05-11 DIAGNOSIS — M79.645 THUMB PAIN, LEFT: ICD-10-CM

## 2022-05-11 PROCEDURE — 97535 SELF CARE MNGMENT TRAINING: CPT | Mod: GO | Performed by: OCCUPATIONAL THERAPIST

## 2022-05-11 PROCEDURE — 97165 OT EVAL LOW COMPLEX 30 MIN: CPT | Mod: GO | Performed by: OCCUPATIONAL THERAPIST

## 2022-05-11 NOTE — PROGRESS NOTES
05/11/22   Hand Therapy Evaluation   General Information/History   Start Of Care Date 05/11/22   Referring Physician Dr. Turner   Orders Evaluate And Treat As Indicated   Orders Date 04/27/22   Medical Diagnosis Left Thumb Pain   Additional Occupational Profile Info/Pertinent history of current problem Patient relates she started having eft thumb in January that has progressively gotten worse.  Patient is 23 weeks pregnant.   Previous treatment or current condition nothing   Past medical history currently pregnant   How/Where did it occur From insidious onset;At home   Onset date of current episode/exacerbation 01/06/22   Chronicity New   Hand Dominance Left   Affected side Left   Functional limitations perform activities of daily living;perform required work activities;perform desired leisure / sports activities   Reported Symptoms Pain;Loss of Motion/Stiffness   Prior level of function Independent ADL;Independent IADL   Important Activities playing baseball, golf, outdoor hiking, camping , gardening   Patient role/Employment history Employed   Occupation RN   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   Patient/Family goals statement Patient would like to be able to open and  things without pain   Fall Risk Screen   Fall screen completed by OT   Have you fallen 2 or more times in the past year? No   Have you fallen and had an injury in the past year? No   Is patient a fall risk? No   Abuse Screen (yes response referral indicated)   Feels Unsafe at Home or Work/School no   Feels Threatened by Someone no   Does Anyone Try to Keep You From Having Contact with Others or Doing Things Outside Your Home? no   Physical Signs of Abuse Present no   Pain   Pain Primary Pain Report   Primary Pain Report   Location Thumb  MP joint   Pain Quality Sharp   Frequency Intermittent   Scale 0/10;3/10   Pain Is Worse During The Day   Pain Is Exacerbated By Carrying;Pinching;Pushing;Gripping   Pain Is Relieved By Nothing   Progression Since Onset Gradually Worsening   Edema   Edema Distal Wrist Crease   Edema Comments Thumb MP 7.5 cm bilaterally   Distal Wrist Crease (measured in cm)   Distal Wrist Crease - - Left 16.8   Distal Wrist Crease - - Right 16.5   Palpation   Palpation Assessed   Left Hand  Palpation Comments tender with palpation to dorsal  left thumb MP   ROM   ROM AROM   AROM   AROM Thumb   Comments Increase pain at thumb MP joint with composite flexion   Thumb   MP Extension - Left 0- can hyperextension with right MP   MP Flexion - Left 60   MP Flexion - Right 60   IP Flexion - Left 45   IP Flexion - Right 55   RABD - Left 5.5   RABD - Right 6.8   Sensation Findings   Sensation Findings Other (see comments)   Sensation Comments no sensory complaints   Strength   Strength Strength;Other (see comments)    Avg - Left 72    Avg - Right 77   Lateral Pinch - Left 18   Lateral Pinch - Right 22   3 Point Pinch - Left 22   3 Point Pinch - Right 25   Strength Comments 2-3 pain- when pinching at dorsal MP joint   Education Assessment   Preferred Learning Style Listening;Demonstration;Reading   Barriers to Learning No barriers   Therapy Interventions   Planned Therapy Interventions Ultrasound;Light Therapy;Fluidotherapy;Splinting;Education of splint wear, care, fit and precautions;Joint Protection Instruction;Home Program   Therapy plan comments To be added as tolerated   Clinical Impression   Criteria for Skilled Therapeutic Interventions Met yes;treatment indicated   OT Diagnosis decreased ADL's IADL's   Influenced by the following impairments Pain;Decreased range of motion;Decreased strength   Assessment of Occupational Performance 3-5 Performance Deficits   Identified Performance Deficits opening jar, cleaning, hobbies, work    Clinical Decision Making (Complexity) Low complexity   Therapy Frequency 1x/week   Predicted Duration of Therapy Intervention (days/wks) 6 weeks   Risks and Benefits of Treatment have been explained. Yes   Patient, Family & other staff in agreement with plan of care Yes   Clinical Impression Comments Pain mostly in thumb MP. Good , pain free stability, no pain with resistance to tendon. Appears to be more in joint and may respond to splinting to calm down.   Total Evaluation Time   OT Eval, Low Complexity Minutes (75718) 20

## 2022-05-18 ENCOUNTER — PRENATAL OFFICE VISIT (OUTPATIENT)
Dept: OBGYN | Facility: CLINIC | Age: 37
End: 2022-05-18
Payer: COMMERCIAL

## 2022-05-18 VITALS
WEIGHT: 182 LBS | SYSTOLIC BLOOD PRESSURE: 99 MMHG | HEIGHT: 62 IN | RESPIRATION RATE: 16 BRPM | TEMPERATURE: 96.8 F | DIASTOLIC BLOOD PRESSURE: 62 MMHG | BODY MASS INDEX: 33.49 KG/M2 | HEART RATE: 81 BPM

## 2022-05-18 DIAGNOSIS — O43.112 CIRCUMVALLATE PLACENTA IN SECOND TRIMESTER: Primary | ICD-10-CM

## 2022-05-18 DIAGNOSIS — Z34.80 PRENATAL CARE OF MULTIGRAVIDA, ANTEPARTUM: ICD-10-CM

## 2022-05-18 DIAGNOSIS — Z34.82 PRENATAL CARE, SUBSEQUENT PREGNANCY IN SECOND TRIMESTER: Primary | ICD-10-CM

## 2022-05-18 LAB
ERYTHROCYTE [DISTWIDTH] IN BLOOD BY AUTOMATED COUNT: 13.2 % (ref 10–15)
GLUCOSE 1H P 50 G GLC PO SERPL-MCNC: 148 MG/DL (ref 70–129)
HCT VFR BLD AUTO: 34.2 % (ref 35–47)
HGB BLD-MCNC: 11.3 G/DL (ref 11.7–15.7)
MCH RBC QN AUTO: 29.3 PG (ref 26.5–33)
MCHC RBC AUTO-ENTMCNC: 33 G/DL (ref 31.5–36.5)
MCV RBC AUTO: 89 FL (ref 78–100)
PLATELET # BLD AUTO: 189 10E3/UL (ref 150–450)
RBC # BLD AUTO: 3.86 10E6/UL (ref 3.8–5.2)
WBC # BLD AUTO: 7.7 10E3/UL (ref 4–11)

## 2022-05-18 PROCEDURE — 86780 TREPONEMA PALLIDUM: CPT | Performed by: OBSTETRICS & GYNECOLOGY

## 2022-05-18 PROCEDURE — 85027 COMPLETE CBC AUTOMATED: CPT | Performed by: OBSTETRICS & GYNECOLOGY

## 2022-05-18 PROCEDURE — 99207 PR PRENATAL VISIT: CPT | Performed by: OBSTETRICS & GYNECOLOGY

## 2022-05-18 PROCEDURE — 82950 GLUCOSE TEST: CPT | Performed by: OBSTETRICS & GYNECOLOGY

## 2022-05-18 PROCEDURE — 36415 COLL VENOUS BLD VENIPUNCTURE: CPT | Performed by: OBSTETRICS & GYNECOLOGY

## 2022-05-18 NOTE — NURSING NOTE
"Initial BP 99/62 (BP Location: Right arm, Patient Position: Chair, Cuff Size: Adult Regular)   Pulse 81   Temp 96.8  F (36  C) (Tympanic)   Resp 16   Ht 1.575 m (5' 2\")   Wt 82.6 kg (182 lb)   LMP 11/13/2021 (Approximate)   Breastfeeding No   BMI 33.29 kg/m   Estimated body mass index is 33.29 kg/m  as calculated from the following:    Height as of this encounter: 1.575 m (5' 2\").    Weight as of this encounter: 82.6 kg (182 lb). .    Alma Rosa Laughlin, DEEP    "

## 2022-05-18 NOTE — PROGRESS NOTES
"[facility]     F/U OB Visit Note     Subjective:     Teresa is a 36 year old  at 24w0d by 8wk US. N/v resolved. Active baby. No VB, ctx or LOF.      Objective:  BP 99/62 (BP Location: Right arm, Patient Position: Chair, Cuff Size: Adult Regular)   Pulse 81   Temp 96.8  F (36  C) (Tympanic)   Resp 16   Ht 1.575 m (5' 2\")   Wt 82.6 kg (182 lb)   LMP 2021 (Approximate)   Breastfeeding No   BMI 33.29 kg/m       See OB flowsheet     Assessment and Plan:   36 year old  at 24w0d by 8w0d     1) Labs nl, 3rd tri labs today  2) Rh neg; knows rhogam next appt  3) s/p COVID, booster and flu  4) Declines genetics  5) AMA; L2 with PACs - resolved and circumvallate placenta, growths ordered      Return to clinic in 4 weeks. Labor and FM precautions reviewed.      Uma Watkins MD  OB/GYN      "

## 2022-05-19 LAB — T PALLIDUM AB SER QL: NONREACTIVE

## 2022-05-20 ENCOUNTER — LAB (OUTPATIENT)
Dept: LAB | Facility: CLINIC | Age: 37
End: 2022-05-20
Payer: COMMERCIAL

## 2022-05-20 DIAGNOSIS — Z34.82 PRENATAL CARE, SUBSEQUENT PREGNANCY IN SECOND TRIMESTER: ICD-10-CM

## 2022-05-20 LAB
GESTATIONAL GTT 1 HR POST DOSE: 135 MG/DL (ref 60–179)
GESTATIONAL GTT 2 HR POST DOSE: 108 MG/DL (ref 60–154)
GESTATIONAL GTT 3 HR POST DOSE: 116 MG/DL (ref 60–139)
GLUCOSE P FAST SERPL-MCNC: 86 MG/DL (ref 60–94)

## 2022-05-20 PROCEDURE — 82952 GTT-ADDED SAMPLES: CPT

## 2022-05-20 PROCEDURE — 82951 GLUCOSE TOLERANCE TEST (GTT): CPT

## 2022-05-20 PROCEDURE — 36415 COLL VENOUS BLD VENIPUNCTURE: CPT

## 2022-06-02 ENCOUNTER — HOSPITAL ENCOUNTER (OUTPATIENT)
Dept: OCCUPATIONAL THERAPY | Facility: CLINIC | Age: 37
Setting detail: THERAPIES SERIES
Discharge: HOME | End: 2022-06-02
Attending: OBSTETRICS & GYNECOLOGY
Payer: COMMERCIAL

## 2022-06-02 PROCEDURE — 97535 SELF CARE MNGMENT TRAINING: CPT | Mod: GO | Performed by: OCCUPATIONAL THERAPIST

## 2022-06-02 PROCEDURE — 97110 THERAPEUTIC EXERCISES: CPT | Mod: GO | Performed by: OCCUPATIONAL THERAPIST

## 2022-06-15 ENCOUNTER — PRENATAL OFFICE VISIT (OUTPATIENT)
Dept: OBGYN | Facility: CLINIC | Age: 37
End: 2022-06-15
Payer: COMMERCIAL

## 2022-06-15 ENCOUNTER — HOSPITAL ENCOUNTER (OUTPATIENT)
Dept: ULTRASOUND IMAGING | Facility: CLINIC | Age: 37
Discharge: HOME | End: 2022-06-15
Attending: OBSTETRICS & GYNECOLOGY | Admitting: OBSTETRICS & GYNECOLOGY
Payer: COMMERCIAL

## 2022-06-15 VITALS
TEMPERATURE: 96.5 F | DIASTOLIC BLOOD PRESSURE: 56 MMHG | SYSTOLIC BLOOD PRESSURE: 99 MMHG | RESPIRATION RATE: 16 BRPM | HEIGHT: 65 IN | WEIGHT: 185.4 LBS | BODY MASS INDEX: 30.89 KG/M2 | HEART RATE: 78 BPM

## 2022-06-15 DIAGNOSIS — Z67.91 RH NEGATIVE STATE IN ANTEPARTUM PERIOD: ICD-10-CM

## 2022-06-15 DIAGNOSIS — O43.112 CIRCUMVALLATE PLACENTA IN SECOND TRIMESTER: ICD-10-CM

## 2022-06-15 DIAGNOSIS — O26.899 RH NEGATIVE STATE IN ANTEPARTUM PERIOD: ICD-10-CM

## 2022-06-15 DIAGNOSIS — O43.113 CIRCUMVALLATE PLACENTA DURING PREGNANCY IN THIRD TRIMESTER, ANTEPARTUM: ICD-10-CM

## 2022-06-15 DIAGNOSIS — Z34.83 PRENATAL CARE, SUBSEQUENT PREGNANCY IN THIRD TRIMESTER: Primary | ICD-10-CM

## 2022-06-15 PROCEDURE — 90715 TDAP VACCINE 7 YRS/> IM: CPT | Performed by: OBSTETRICS & GYNECOLOGY

## 2022-06-15 PROCEDURE — 99207 PR PRENATAL VISIT: CPT | Performed by: OBSTETRICS & GYNECOLOGY

## 2022-06-15 PROCEDURE — 90471 IMMUNIZATION ADMIN: CPT | Performed by: OBSTETRICS & GYNECOLOGY

## 2022-06-15 PROCEDURE — 76816 OB US FOLLOW-UP PER FETUS: CPT

## 2022-06-15 PROCEDURE — 96372 THER/PROPH/DIAG INJ SC/IM: CPT | Performed by: OBSTETRICS & GYNECOLOGY

## 2022-06-15 NOTE — PROGRESS NOTES
Clinic Administered Medication Documentation    Administrations This Visit     rho(D) immune globulin (RHOGAM) injection 300 mcg     Admin Date  06/15/2022 Action  Given Dose  300 mcg Route  Intramuscular Site  Right Ventrogluteal Administered By  Anabelle Dunne CMA    Ordering Provider: Nicolas Turner MD    Patient Supplied?: No                  Injectable Medication Documentation    Patient was given Rhogam. Prior to medication administration, verified patients identity using patient s name and date of birth. Please see MAR and medication order for additional information. Patient instructed to remain in clinic for 15 minutes.      Was entire vial of medication used? Yes  Vial/Syringe: Single dose vial  Expiration Date:  10/15/2023  Was this medication supplied by the patient? No

## 2022-06-15 NOTE — PROGRESS NOTES
Prior to immunization administration, verified patients identity using patient s name and date of birth. Please see Immunization Activity for additional information.     Screening Questionnaire for Adult Immunization    Are you sick today?   No   Do you have allergies to medications, food, a vaccine component or latex?   No   Have you ever had a serious reaction after receiving a vaccination?   No   Do you have a long-term health problem with heart, lung, kidney, or metabolic disease (e.g., diabetes), asthma, a blood disorder, no spleen, complement component deficiency, a cochlear implant, or a spinal fluid leak?  Are you on long-term aspirin therapy?   No   Do you have cancer, leukemia, HIV/AIDS, or any other immune system problem?   No   Do you have a parent, brother, or sister with an immune system problem?   No   In the past 3 months, have you taken medications that affect  your immune system, such as prednisone, other steroids, or anticancer drugs; drugs for the treatment of rheumatoid arthritis, Crohn s disease, or psoriasis; or have you had radiation treatments?   No   Have you had a seizure, or a brain or other nervous system problem?   No   During the past year, have you received a transfusion of blood or blood    products, or been given immune (gamma) globulin or antiviral drug?   No   For women: Are you pregnant or is there a chance you could become       pregnant during the next month?   Current   Have you received any vaccinations in the past 4 weeks?   No          Per orders of Dr. Turner, injection of tdap given by Anabelle Dunne CMA. Patient instructed to remain in clinic for 15 minutes afterwards, and to report any adverse reaction to me immediately.       Screening performed by Anabelle Dunne CMA on 6/15/2022 at 2:59 PM.

## 2022-06-15 NOTE — PROGRESS NOTES
"  Concerns: great relief with Aciphex vs GERD  Doing well.  No concerns today.  Interval growth vs Circumvallate placenta 94th %ile growth  No vaginal bleeding, loss of fluid, or contractions  BP 99/56 (BP Location: Right arm, Patient Position: Sitting, Cuff Size: Adult Regular)   Pulse 78   Temp (!) 96.5  F (35.8  C)   Resp 16   Ht 1.651 m (5' 5\")   Wt 84.1 kg (185 lb 6.4 oz)   LMP 11/13/2021 (Approximate)   BMI 30.85 kg/m      (Z34.83) Prenatal care, subsequent pregnancy in third trimester  (primary encounter diagnosis)  Comment: Rh negative   Plan: TDAP VACCINE (Adacel, Boostrix)  [[id number]],         rho(D) immune globulin (RHOGAM) injection 300         mcg            (O26.899,  Z67.91) Rh negative state in antepartum period  Comment:   Plan: rho(D) immune globulin (RHOGAM) injection 300         mcg, INJECTION INTRAMUSCULAR OR SUB-Q            (O43.113) Circumvallate placenta during pregnancy in third trimester, antepartum  Comment: normal growth so far  Plan: US OB >14 Weeks Follow Up        Repeat growth scan @ 34 weeks      Reportable signs and symptoms discussed.  Tdap given today  .  Discussed kick counts and fetal movement.  Discussed PTL, PROM, and when to call or come in      RTC in 2 weeks.      Nicolas Turner MD    "

## 2022-07-01 ENCOUNTER — PRENATAL OFFICE VISIT (OUTPATIENT)
Dept: OBGYN | Facility: CLINIC | Age: 37
End: 2022-07-01
Payer: COMMERCIAL

## 2022-07-01 VITALS
DIASTOLIC BLOOD PRESSURE: 57 MMHG | SYSTOLIC BLOOD PRESSURE: 122 MMHG | HEART RATE: 84 BPM | RESPIRATION RATE: 18 BRPM | HEIGHT: 62 IN | WEIGHT: 185.2 LBS | BODY MASS INDEX: 34.08 KG/M2 | TEMPERATURE: 97.7 F

## 2022-07-01 DIAGNOSIS — Z34.83 PRENATAL CARE, SUBSEQUENT PREGNANCY IN THIRD TRIMESTER: Primary | ICD-10-CM

## 2022-07-01 PROCEDURE — 99207 PR PRENATAL VISIT: CPT | Performed by: OBSTETRICS & GYNECOLOGY

## 2022-07-01 NOTE — PROGRESS NOTES
"CC: Here for routine prenatal visit @ 30w2d   HPI: + FM, no ctx, no LOF, no VB.  No complaints.     PE: /57 (BP Location: Left arm, Patient Position: Chair, Cuff Size: Adult Regular)   Pulse 84   Temp 97.7  F (36.5  C) (Tympanic)   Resp 18   Ht 1.575 m (5' 2\")   Wt 84 kg (185 lb 3.2 oz)   LMP 2021 (Approximate)   Breastfeeding No   BMI 33.87 kg/m     See OB flowsheet    A/P  @ 30w2d normal pregnancy    1. Routine prenatal care.  COVID restrictions and recommendations reviewed including iron supplementation.   2. Circumvallate placenta: repeat growth @ 34 weeks.     RTC 2 weeks.      Fabi So M.D.    "

## 2022-07-13 ENCOUNTER — PRENATAL OFFICE VISIT (OUTPATIENT)
Dept: OBGYN | Facility: CLINIC | Age: 37
End: 2022-07-13
Payer: COMMERCIAL

## 2022-07-13 VITALS
DIASTOLIC BLOOD PRESSURE: 58 MMHG | TEMPERATURE: 97.3 F | HEART RATE: 87 BPM | WEIGHT: 183.7 LBS | BODY MASS INDEX: 33.8 KG/M2 | RESPIRATION RATE: 12 BRPM | SYSTOLIC BLOOD PRESSURE: 99 MMHG | HEIGHT: 62 IN

## 2022-07-13 DIAGNOSIS — O26.899 RH NEGATIVE STATE IN ANTEPARTUM PERIOD: ICD-10-CM

## 2022-07-13 DIAGNOSIS — Z34.83 PRENATAL CARE, SUBSEQUENT PREGNANCY IN THIRD TRIMESTER: Primary | ICD-10-CM

## 2022-07-13 DIAGNOSIS — Z67.91 RH NEGATIVE STATE IN ANTEPARTUM PERIOD: ICD-10-CM

## 2022-07-13 DIAGNOSIS — O43.112 CIRCUMVALLATE PLACENTA IN SECOND TRIMESTER: ICD-10-CM

## 2022-07-13 PROCEDURE — 99207 PR PRENATAL VISIT: CPT | Performed by: OBSTETRICS & GYNECOLOGY

## 2022-07-13 NOTE — NURSING NOTE
"Initial BP 99/58 (BP Location: Left arm, Patient Position: Sitting, Cuff Size: Adult Regular)   Pulse 87   Temp 97.3  F (36.3  C) (Tympanic)   Resp 12   Ht 1.575 m (5' 2\")   Wt 83.3 kg (183 lb 11.2 oz)   LMP 11/13/2021 (Approximate)   BMI 33.60 kg/m   Estimated body mass index is 33.6 kg/m  as calculated from the following:    Height as of this encounter: 1.575 m (5' 2\").    Weight as of this encounter: 83.3 kg (183 lb 11.2 oz). .      "

## 2022-07-17 ENCOUNTER — HOSPITAL ENCOUNTER (OUTPATIENT)
Facility: CLINIC | Age: 37
End: 2022-07-17
Admitting: OBSTETRICS & GYNECOLOGY
Payer: COMMERCIAL

## 2022-07-17 ENCOUNTER — HOSPITAL ENCOUNTER (OUTPATIENT)
Facility: CLINIC | Age: 37
Discharge: HOME | End: 2022-07-17
Attending: OBSTETRICS & GYNECOLOGY | Admitting: OBSTETRICS & GYNECOLOGY
Payer: COMMERCIAL

## 2022-07-17 ENCOUNTER — NURSE TRIAGE (OUTPATIENT)
Dept: NURSING | Facility: CLINIC | Age: 37
End: 2022-07-17

## 2022-07-17 VITALS
SYSTOLIC BLOOD PRESSURE: 105 MMHG | TEMPERATURE: 98.5 F | RESPIRATION RATE: 16 BRPM | DIASTOLIC BLOOD PRESSURE: 60 MMHG | HEART RATE: 78 BPM

## 2022-07-17 PROBLEM — O26.899 CRAMPING AFFECTING PREGNANCY, ANTEPARTUM: Status: ACTIVE | Noted: 2022-07-17

## 2022-07-17 PROBLEM — R10.9 CRAMPING AFFECTING PREGNANCY, ANTEPARTUM: Status: ACTIVE | Noted: 2022-07-17

## 2022-07-17 LAB
ALBUMIN UR-MCNC: NEGATIVE MG/DL
APPEARANCE UR: ABNORMAL
BILIRUB UR QL STRIP: NEGATIVE
COLOR UR AUTO: YELLOW
GLUCOSE UR STRIP-MCNC: NEGATIVE MG/DL
HGB UR QL STRIP: NEGATIVE
KETONES UR STRIP-MCNC: NEGATIVE MG/DL
LEUKOCYTE ESTERASE UR QL STRIP: NEGATIVE
MUCOUS THREADS #/AREA URNS LPF: PRESENT /LPF
NITRATE UR QL: NEGATIVE
PH UR STRIP: 6 [PH] (ref 5–7)
RBC URINE: <1 /HPF
SP GR UR STRIP: 1.01 (ref 1–1.03)
SQUAMOUS EPITHELIAL: 5 /HPF
UROBILINOGEN UR STRIP-MCNC: NORMAL MG/DL
WBC URINE: 2 /HPF

## 2022-07-17 PROCEDURE — 59025 FETAL NON-STRESS TEST: CPT

## 2022-07-17 PROCEDURE — 81001 URINALYSIS AUTO W/SCOPE: CPT | Performed by: OBSTETRICS & GYNECOLOGY

## 2022-07-17 PROCEDURE — G0463 HOSPITAL OUTPT CLINIC VISIT: HCPCS

## 2022-07-17 RX ORDER — PROCHLORPERAZINE MALEATE 10 MG
10 TABLET ORAL EVERY 6 HOURS PRN
Status: DISCONTINUED | OUTPATIENT
Start: 2022-07-17 | End: 2022-07-17 | Stop reason: HOSPADM

## 2022-07-17 RX ORDER — METOCLOPRAMIDE HYDROCHLORIDE 5 MG/ML
10 INJECTION INTRAMUSCULAR; INTRAVENOUS EVERY 6 HOURS PRN
Status: DISCONTINUED | OUTPATIENT
Start: 2022-07-17 | End: 2022-07-17 | Stop reason: HOSPADM

## 2022-07-17 RX ORDER — METOCLOPRAMIDE 10 MG/1
10 TABLET ORAL EVERY 6 HOURS PRN
Status: DISCONTINUED | OUTPATIENT
Start: 2022-07-17 | End: 2022-07-17 | Stop reason: HOSPADM

## 2022-07-17 RX ORDER — ONDANSETRON 4 MG/1
4 TABLET, ORALLY DISINTEGRATING ORAL EVERY 6 HOURS PRN
Status: DISCONTINUED | OUTPATIENT
Start: 2022-07-17 | End: 2022-07-17 | Stop reason: HOSPADM

## 2022-07-17 RX ORDER — ONDANSETRON 2 MG/ML
4 INJECTION INTRAMUSCULAR; INTRAVENOUS EVERY 6 HOURS PRN
Status: DISCONTINUED | OUTPATIENT
Start: 2022-07-17 | End: 2022-07-17 | Stop reason: HOSPADM

## 2022-07-17 RX ORDER — PROCHLORPERAZINE 25 MG
25 SUPPOSITORY, RECTAL RECTAL EVERY 12 HOURS PRN
Status: DISCONTINUED | OUTPATIENT
Start: 2022-07-17 | End: 2022-07-17 | Stop reason: HOSPADM

## 2022-07-17 NOTE — TELEPHONE ENCOUNTER
Pt calling in to nurse triage today with concerns about ongoing pressure and cramping in her abdomen. Pt. Is 34 and 4 days pregnant has been having Bret cano contractions since yesterday. Also experienced some very sharp right side pain yesterday evening about 5 or 530 pm this pain was 7-8/10. Ever since this event she has been cramping. Sometimes the tightness will last between 10 and 20 minutes. No vaginal pressure noted - doesn't feel the same as labor per Pt. report- this is her 2nd child. She has also been experiencing back pain, this morning- pain radiates across both sides but is more painful on the right side. Pt has PMH of PAC's and sustained nausea from the 3rd to 28th week.   Current circumvallate placenta.  Disposition to go to L&D now - RN contacted [facility] and spoke to David. Pt is amenable to disposition and verbalizes agreement and understanding       Imani Jacobs RN, MN, PHN on 7/17/2022 at 12:08 PM  [facility]  RN utilized sound nursing judgement based on facility triage protocols during this encounter.           Reason for Disposition    [1] MILD abdominal pain (e.g., doesn't interfere with normal activities) AND [2] constant AND [3] present > 2 hours    Additional Information    Negative: Passed out (i.e., lost consciousness, collapsed and was not responding)    Negative: Shock suspected (e.g., cold/pale/clammy skin, too weak to stand, low BP, rapid pulse)    Negative: Difficult to awaken or acting confused (e.g., disoriented, slurred speech)    Negative: [1] SEVERE abdominal pain (e.g., excruciating) AND [2] constant AND [3] present > 1 hour    Negative: SEVERE vaginal bleeding (e.g., continuous red blood from vagina, large blood clots)    Negative: Sounds like a life-threatening emergency to the triager    Negative: Followed an abdomen (stomach) injury    Negative: [1] Having contractions or other symptoms of labor (such as vaginal pressure) AND [2] >= 37 weeks  "pregnant (i.e., term pregnancy)    Negative: [1] Having contractions or other symptoms of labor (such as vaginal pressure) AND [2] < 37 weeks pregnant (i.e., )    Negative: [1] Abdominal pain AND [2] pregnant < 20 weeks    Negative: [1] Vomiting AND [2] contains red blood or black (\"coffee ground\") material  (Exception: few red streaks in vomit that only happened once)    Negative: New hand or face swelling    Negative: Leakage of fluid from vagina    Negative: New blurred vision or vision changes    Negative: [1] SEVERE headache AND [2] not relieved with acetaminophen (e.g., Tylenol)    Negative: Vaginal bleeding or spotting    Negative: [1] Baby moving less today (e.g., kick count < 5 in 1 hour or < 10 in 2 hours) AND [2] pregnant 23 or more weeks    Negative: MODERATE-SEVERE abdominal pain (e.g., interferes with normal activities, awakens from sleep)    Answer Assessment - Initial Assessment Questions  1. LOCATION: \"Where does it hurt?\"       Foard cano lower - when they let up very achy  Was gardening yesterday moving around yesterday      2. RADIATION: \"Does the pain shoot anywhere else?\" (e.g., chest, back)      Was right sides yesterday     3. ONSET: \"When did the pain begin?\" (Minutes, hours or days ago)       Yesterday around 5:30 pm     4. ONSET: \"Gradual or sudden onset?\"      Sudden onset     5. PATTERN: \"Does the pain come and go, or has it been constant since it started?\"       10 -20 mins lasting - pretty random no pattern     6. SEVERITY: \"How bad is the pain?\" \"What does it keep you from doing?\"  (e.g., Scale 1-10; mild, moderate, or severe)    - MILD (1-3): doesn't interfere with normal activities, abdomen soft and not tender to touch     - MODERATE (4-7): interferes with normal activities or awakens from sleep, tender to touch     - SEVERE (8-10): excruciating pain, doubled over, unable to do any normal activities  Yesterday the right sided pain made her cry 7-8/10  Otherwise mild " "cramping         7. RECURRENT SYMPTOM: \"Have you ever had this type of abdominal pain before?\" If so, ask: \"When was the last time?\" and \"What happened that time?\"       No tres cano with there pregnancy until 2 days from due date no pain     8. CAUSE: \"What do you think is causing the abdominal pain?      Pregnancy    9. RELIEVING/AGGRAVATING FACTORS: \"What makes it better or worse?\" (e.g., antacids, bowel movement, movement)      Activity makes it worse     10. OTHER SYMPTOMS: \"Has there been any vaginal bleeding, fever, vomiting, diarrhea, or urine problems?\"        Loose stool and cramping     11. GABRIELA: \"What date are you expecting to deliver?\"        September 7, 2022    Protocols used: PREGNANCY - ABDOMINAL PAIN GREATER THAN 20 WEEKS EGA-A-  COVID 19 Nurse Triage Plan/Patient Instructions    Please be aware that novel coronavirus (COVID-19) may be circulating in the community. If you develop symptoms such as fever, cough, or SOB or if you have concerns about the presence of another infection including coronavirus (COVID-19), please contact your health care provider or visit [email or web address].     Disposition/Instructions    ED Visit recommended. Follow protocol based instructions.     Bring Your Own Device:  Please also bring your smart device(s) (smart phones, tablets, laptops) and their charging cables for your personal use and to communicate with your care team during your visit.    Thank you for taking steps to prevent the spread of this virus.  o Limit your contact with others.  o Wear a simple mask to cover your cough.  o Wash your hands well and often.    Resources    [facility]: About COVID-19: [email or web address]    CDC: What to Do If You're Sick: www.cdc.gov/coronavirus/2019-ncov/about/steps-when-sick.html    CDC: Ending Home Isolation: www.cdc.gov/coronavirus/2019-ncov/hcp/disposition-in-home-patients.html     CDC: Caring for Someone: " www.cdc.gov/coronavirus/2019-ncov/if-you-are-sick/care-for-someone.html     [facility]: Interim Guidance for Hospital Discharge to Home: www.health.[facility].[place]/diseases/coronavirus/hcp/hospdischarge.pdf    [facility] clinical trials (COVID-19 research studies): [email or web address]     Below are the COVID-19 hotlines at the [place] [facility] ([facility]). Interpreters are available.   o For health questions: Call 935-010-8079 or 1-200.455.8933 (7 a.m. to 7 p.m.)  o For questions about schools and childcare: Call 102-981-2016 or 1-199.120.7896 (7 a.m. to 7 p.m.)

## 2022-07-17 NOTE — DISCHARGE INSTRUCTIONS
Discharge Instruction for Undelivered Patients      You were seen for: Labor Assessment  We Consulted: Dr Turner  You had NST, UA, and fetal monioring    Diet:   Drink 8 to 12 glasses of liquids (milk, juice, water) every day.     Activity:  Count fetal kicks everyday (see handout)     Call your provider if you notice:  Swelling in your face or increased swelling in your hands or legs.  Headaches that are not relieved by Tylenol (acetaminophen).  Changes in your vision (blurring: seeing spots or stars.)  Nausea (sick to your stomach) and vomiting (throwing up).   Weight gain of 5 pounds or more per week.  Heartburn that doesn't go away.  Signs of bladder infection: pain when you urinate (use the toilet), need to go more often and more urgently.  The bag of correia (rupture of membranes) breaks, or you notice leaking in your underwear.  Bright red blood in your underwear.  Abdominal (lower belly) or stomach pain.  For first baby: Contractions (tightening) less than 5 minutes apart for one hour or more.  Second (plus) baby: Contractions (tightening) less than 10 minutes apart and getting stronger.  *If less than 34 weeks: Contractions (tightening) more than 6 times in one hour.  Increase or change in vaginal discharge (note the color and amount)      Follow-up:  As scheduled in the clinic

## 2022-07-17 NOTE — PROGRESS NOTES
Multip 32+4 arrived with complaints of cramping. UA obtained sent to lab. States cramping started yesterday, spent most of day outside with 2 year old and in garden. Cramping improved when she rested but still felt some during night and today. Dr Turner notified of arrival, encouraged drinking fluids.    
Patient states feeling better. Free from irritability now. Discharge instructions reviewed states understanding. Instructed to keep appointments. Discharged ambulatory to home.   
Private car

## 2022-07-25 ENCOUNTER — TELEPHONE (OUTPATIENT)
Dept: FAMILY MEDICINE | Facility: CLINIC | Age: 37
End: 2022-07-25

## 2022-07-25 NOTE — TELEPHONE ENCOUNTER
Patient Quality Outreach    Patient is due for the following:   Asthma  -  ACT needed    NEXT STEPS:   complete act    Type of outreach:    Sent [employer] message.  Will postpone x 1 week, if not viewed or completed please mail ACT.         Questions for provider review:    None     Karen Casper, CMA

## 2022-07-25 NOTE — LETTER
August 1, 2022      Teresa Salinas  [address] [address] [address]        Dear Teresa,     Your healthcare team cares about your health. To provide you with the best care, we have reviewed your chart and based on our findings, we see that you are due for:   An Asthma Control Test (ACT) that our clinic uses to monitor and manage your asthma. This test is an assessment tool that we use to determine how well your asthma is controlled.  Please complete the enclosed form and return in the provided envelope.  Thank you for choosing [facility] for your healthcare needs. For any questions, concerns, or to schedule an appointment please contact the clinic.   Healthy Regards,    Your [facility] Care Team

## 2022-07-27 ENCOUNTER — PRENATAL OFFICE VISIT (OUTPATIENT)
Dept: OBGYN | Facility: CLINIC | Age: 37
End: 2022-07-27
Payer: COMMERCIAL

## 2022-07-27 ENCOUNTER — HOSPITAL ENCOUNTER (OUTPATIENT)
Dept: ULTRASOUND IMAGING | Facility: CLINIC | Age: 37
Discharge: HOME | End: 2022-07-27
Attending: OBSTETRICS & GYNECOLOGY | Admitting: OBSTETRICS & GYNECOLOGY
Payer: COMMERCIAL

## 2022-07-27 VITALS
WEIGHT: 185.2 LBS | RESPIRATION RATE: 20 BRPM | HEART RATE: 97 BPM | BODY MASS INDEX: 34.08 KG/M2 | DIASTOLIC BLOOD PRESSURE: 72 MMHG | TEMPERATURE: 97.6 F | HEIGHT: 62 IN | SYSTOLIC BLOOD PRESSURE: 121 MMHG

## 2022-07-27 DIAGNOSIS — K21.00 GASTROESOPHAGEAL REFLUX DISEASE WITH ESOPHAGITIS WITHOUT HEMORRHAGE: ICD-10-CM

## 2022-07-27 DIAGNOSIS — O43.113 CIRCUMVALLATE PLACENTA DURING PREGNANCY IN THIRD TRIMESTER, ANTEPARTUM: ICD-10-CM

## 2022-07-27 DIAGNOSIS — Z34.82 ENCOUNTER FOR SUPERVISION OF OTHER NORMAL PREGNANCY IN SECOND TRIMESTER: Primary | ICD-10-CM

## 2022-07-27 PROCEDURE — 76816 OB US FOLLOW-UP PER FETUS: CPT

## 2022-07-27 PROCEDURE — 99207 PR PRENATAL VISIT: CPT | Performed by: OBSTETRICS & GYNECOLOGY

## 2022-07-27 RX ORDER — RABEPRAZOLE SODIUM 20 MG/1
20 TABLET, DELAYED RELEASE ORAL DAILY
Qty: 30 TABLET | Refills: 3 | Status: SHIPPED | OUTPATIENT
Start: 2022-07-27 | End: 2024-06-12

## 2022-07-27 NOTE — PROGRESS NOTES
Concerns: interval growth @ 85%ile  Doing well.  No concerns today.  No vaginal bleeding, LOF.  No contractions.  Reportable signs and symptoms discussed.  Discussed kick counts and fetal movement.  Discussed PTL, PROM, and when to call or come in.  RTC 2 weeks.    Nicolas Turner MD

## 2022-08-09 ENCOUNTER — TELEPHONE (OUTPATIENT)
Dept: FAMILY MEDICINE | Facility: CLINIC | Age: 37
End: 2022-08-09

## 2022-08-09 ASSESSMENT — ASTHMA QUESTIONNAIRES: ACT_TOTALSCORE: 25

## 2022-08-09 NOTE — TELEPHONE ENCOUNTER
Patient Quality Outreach    Patient is due for the following:   Asthma  -  ACT needed    Next Steps:   ACT was mailed back and updated    Type of outreach:    Copy of ACT mailed to patient.      Questions for provider review:    None  Patient mailed back completed ACT . ACT was updated in chart.     Jerman Breaux, CMA

## 2022-08-12 ENCOUNTER — PRENATAL OFFICE VISIT (OUTPATIENT)
Dept: OBGYN | Facility: CLINIC | Age: 37
End: 2022-08-12
Payer: COMMERCIAL

## 2022-08-12 VITALS
HEIGHT: 62 IN | BODY MASS INDEX: 34.78 KG/M2 | SYSTOLIC BLOOD PRESSURE: 102 MMHG | WEIGHT: 189 LBS | DIASTOLIC BLOOD PRESSURE: 52 MMHG | TEMPERATURE: 98.1 F | RESPIRATION RATE: 18 BRPM | HEART RATE: 66 BPM

## 2022-08-12 DIAGNOSIS — Z34.83 PRENATAL CARE, SUBSEQUENT PREGNANCY IN THIRD TRIMESTER: ICD-10-CM

## 2022-08-12 DIAGNOSIS — Z34.82 ENCOUNTER FOR SUPERVISION OF OTHER NORMAL PREGNANCY IN SECOND TRIMESTER: Primary | ICD-10-CM

## 2022-08-12 PROCEDURE — 99207 PR PRENATAL VISIT: CPT | Performed by: OBSTETRICS & GYNECOLOGY

## 2022-08-12 NOTE — PROGRESS NOTES
"  Return OB Note     CC: Return OB      Subjective:  Teresa is a 36 year old  at 36w2d   Denies vaginal bleeding, loss of fluid, or regular contractions. Good fetal movement.  Complaints today: None     Objective:  /52 (BP Location: Left arm, Patient Position: Chair, Cuff Size: Adult Regular)   Pulse 66   Temp 98.1  F (36.7  C) (Tympanic)   Resp 18   Ht 1.575 m (5' 2\")   Wt 85.7 kg (189 lb)   LMP 2021 (Approximate)   Breastfeeding No   BMI 34.57 kg/m      Fundal height: 36cm  FHT: 140s  SVE 1-2/50/-2 soft ant     Assessment/Plan:     IUP at 36w2d   -Rh negative: s/p Rhogam  -Circumvallate placenta: following growth which has been normal, next is scheduled for 34 weeks  -AMA: L2 normal  -Failed 1 hr GCT, passed 3 hr GTT  -S/p COVID vaccine and booster  -Strict return precautions given     RTC 1 week    Ev Martin MD   2022 12:47 PM     "

## 2022-08-17 ENCOUNTER — PRENATAL OFFICE VISIT (OUTPATIENT)
Dept: OBGYN | Facility: CLINIC | Age: 37
End: 2022-08-17
Payer: COMMERCIAL

## 2022-08-17 VITALS
WEIGHT: 187.1 LBS | HEIGHT: 62 IN | DIASTOLIC BLOOD PRESSURE: 63 MMHG | RESPIRATION RATE: 16 BRPM | SYSTOLIC BLOOD PRESSURE: 100 MMHG | BODY MASS INDEX: 34.43 KG/M2 | HEART RATE: 71 BPM | TEMPERATURE: 96.4 F

## 2022-08-17 DIAGNOSIS — Z34.83 ENCOUNTER FOR SUPERVISION OF OTHER NORMAL PREGNANCY IN THIRD TRIMESTER: Primary | ICD-10-CM

## 2022-08-17 PROCEDURE — 99207 PR PRENATAL VISIT: CPT | Performed by: OBSTETRICS & GYNECOLOGY

## 2022-08-17 NOTE — PROGRESS NOTES
Concerns:   Doing well.  No concerns today.  No vaginal bleeding, LOF, contractions.  No HA, RUQ pain, N/V, visual changes.  Discussed signs of labor and when to call or come in.  Reportable signs and symptoms discussed.  Labor precautions discussed.  RTC 1 week.    Nicolas Turner MD

## 2022-08-17 NOTE — LETTER
[facility] [place]  [address] [address]  Phone: [phone]      August 18, 2022      Treesa Salinas  [address] [address] [address]              To whom it may concern:    Teresa Salinas is being seen in our clinic for prenatal care.  Please limit her to 8 hour working shifts for the remainder of her pregnancy.    Sincerely,    Nicolas Turner MD

## 2022-08-24 ENCOUNTER — PRENATAL OFFICE VISIT (OUTPATIENT)
Dept: OBGYN | Facility: CLINIC | Age: 37
End: 2022-08-24
Payer: COMMERCIAL

## 2022-08-24 VITALS
TEMPERATURE: 97.8 F | HEIGHT: 65 IN | DIASTOLIC BLOOD PRESSURE: 68 MMHG | WEIGHT: 188.3 LBS | SYSTOLIC BLOOD PRESSURE: 117 MMHG | HEART RATE: 76 BPM | RESPIRATION RATE: 16 BRPM | BODY MASS INDEX: 31.37 KG/M2

## 2022-08-24 DIAGNOSIS — Z34.83 ENCOUNTER FOR SUPERVISION OF OTHER NORMAL PREGNANCY IN THIRD TRIMESTER: Primary | ICD-10-CM

## 2022-08-24 PROCEDURE — 99207 PR PRENATAL VISIT: CPT | Performed by: OBSTETRICS & GYNECOLOGY

## 2022-08-24 NOTE — PROGRESS NOTES
Concerns: entering her [employer] in the [employer] contest tomorrow  Doing well.  No concerns today.  No vaginal bleeding, LOF, contractions.  No HA, RUQ pain, N/V, visual changes.  Discussed signs of labor and when to call or come in.  Labor precautions discussed.  RTC 1 week.    Nicolas Turner MD

## 2022-08-24 NOTE — NURSING NOTE
"Initial /68 (BP Location: Right arm, Patient Position: Chair, Cuff Size: Adult Regular)   Pulse 76   Temp 97.8  F (36.6  C) (Tympanic)   Resp 16   Ht 1.651 m (5' 5\")   Wt 85.4 kg (188 lb 4.8 oz)   LMP 11/13/2021 (Approximate)   BMI 31.33 kg/m   Estimated body mass index is 31.33 kg/m  as calculated from the following:    Height as of this encounter: 1.651 m (5' 5\").    Weight as of this encounter: 85.4 kg (188 lb 4.8 oz). .      "

## 2022-09-01 ENCOUNTER — PRENATAL OFFICE VISIT (OUTPATIENT)
Dept: OBGYN | Facility: CLINIC | Age: 37
End: 2022-09-01
Payer: COMMERCIAL

## 2022-09-01 VITALS
RESPIRATION RATE: 14 BRPM | HEART RATE: 94 BPM | WEIGHT: 194.2 LBS | HEIGHT: 62 IN | DIASTOLIC BLOOD PRESSURE: 71 MMHG | SYSTOLIC BLOOD PRESSURE: 118 MMHG | BODY MASS INDEX: 35.74 KG/M2 | TEMPERATURE: 98 F

## 2022-09-01 DIAGNOSIS — Z34.83 ENCOUNTER FOR SUPERVISION OF OTHER NORMAL PREGNANCY IN THIRD TRIMESTER: Primary | ICD-10-CM

## 2022-09-01 PROCEDURE — 99207 PR PRENATAL VISIT: CPT | Performed by: ADVANCED PRACTICE MIDWIFE

## 2022-09-01 NOTE — PROGRESS NOTES
Feeling well.  Baby is active. Denies any leaking of fluid, vaginal bleeding, regular uterine contractions, or headaches or other concerns.  Discussed elective induction after 39 weeks if the cervix is ripe and recommended induction at 41 weeks.  Plans to reassess next week.    Reviewed to call for contractions, loss of fluid, vaginal bleeding, decreased fetal movement or any other questions or concerns.    RTC in 1 weeks.  Raquel Braga, HASEEB, APRN, CNM

## 2022-09-01 NOTE — NURSING NOTE
"Initial /71 (BP Location: Right arm, Patient Position: Sitting, Cuff Size: Adult Large)   Pulse 94   Temp 98  F (36.7  C) (Tympanic)   Resp 14   Ht 1.575 m (5' 2\")   Wt 88.1 kg (194 lb 3.2 oz)   LMP 11/13/2021 (Approximate)   BMI 35.52 kg/m   Estimated body mass index is 35.52 kg/m  as calculated from the following:    Height as of this encounter: 1.575 m (5' 2\").    Weight as of this encounter: 88.1 kg (194 lb 3.2 oz). .      "

## 2022-09-04 ENCOUNTER — TELEPHONE (OUTPATIENT)
Dept: NURSING | Facility: CLINIC | Age: 37
End: 2022-09-04

## 2022-09-04 NOTE — TELEPHONE ENCOUNTER
Coronavirus (COVID-19) Notification    Caller Name (Patient, parent, daughter/son, grandparent, etc)  Teresamarcin Naranjohens, patient    Reason for call worsening symptoms  Notify of Positive Coronavirus (COVID-19) lab results, assess symptoms,  review [facility] recommendations      Gather patient reported symptoms      Assessment  Current Symptoms at time of phone call, reported by patient: (if no symptoms, document No symptoms]    Cough, fever, aches, chills, nausea, vomiting, diarrhea, headache.  Date of Symptom(s) onset (if applicable)    Friday,Sept.2, 2022    If at time of call, Patients symptoms hare worsened, the Patient should contact 911 or have someone drive them to Emergency Dept promptly:         If Patient calling 911, inform 911 personal that you have tested positive for the Coronavirus (COVID-19).  Place mask on and await 911 to arrive.       If Emergency Dept, If possible, please have another adult drive you to the Emergency Dept but you need to wear mask when in contact with other people.      Monoclonal Antibody Administration    You may be eligible to receive a new treatment with a monoclonal antibody for preventing hospitalization in patients at high risk for complications from COVID-19. This medication is still experimental and available on a limited basis; it is given through an IV and must be given at an infusion center. Please note that not all people who are eligible will receive the medication since it is in limited supply.  Is the patient symptomatic and onset of symptoms within the last 7 days?  Yes  Is the patient interested in a visit with a provider to discuss treatment options?: Yes  Is the patient seen at [facility]?  No: Warm transfer caller to [phone] to be scheduled with a virtual urgent provider.  During transfer, instruct  on appropriate time frame for visit     Review information with Patient    Your result was positive. This means you have COVID-19  (coronavirus).      How can I protect others?    These guidelines are for isolating before returning to work, school or .          If you DO have symptoms:      o    Stay home and away from others          ?    For at least 5 days after your symptoms started, AND       o    Wear a mask for 10 full days any time you are around others.    Would you like me to review some of that information with you now?  Yes    How can I take care of myself?      Get lots of rest. Drink extra fluids (unless a doctor has told you not to).      Take Tylenol (acetaminophen) for fever or pain. If you have liver or kidney problems, ask your family doctor if it's okay to take Tylenol.     Take either:     650 mg (two 325 mg pills) every 4 to 6 hours, or     1,000 mg (two 500 mg pills) every 8 hours as needed.     Note: Do not take more than 3,000 mg in one day. Acetaminophen is found in many medicines (both prescribed and over-the-counter medicines). Read all labels to be sure you don't take too much.    Did not triage. Information only.    Sandra Hartmann RN

## 2022-09-05 ENCOUNTER — HOSPITAL ENCOUNTER (INPATIENT)
Facility: CLINIC | Age: 37
LOS: 2 days | Discharge: HOME | End: 2022-09-07
Attending: OBSTETRICS & GYNECOLOGY | Admitting: OBSTETRICS & GYNECOLOGY
Payer: COMMERCIAL

## 2022-09-05 ENCOUNTER — ANESTHESIA (OUTPATIENT)
Dept: OBGYN | Facility: CLINIC | Age: 37
End: 2022-09-05
Payer: COMMERCIAL

## 2022-09-05 ENCOUNTER — ANESTHESIA EVENT (OUTPATIENT)
Dept: OBGYN | Facility: CLINIC | Age: 37
End: 2022-09-05
Payer: COMMERCIAL

## 2022-09-05 DIAGNOSIS — O43.112 CIRCUMVALLATE PLACENTA IN SECOND TRIMESTER: ICD-10-CM

## 2022-09-05 DIAGNOSIS — U07.1 INFECTION DUE TO 2019 NOVEL CORONAVIRUS: ICD-10-CM

## 2022-09-05 DIAGNOSIS — O99.820 GBS (GROUP B STREPTOCOCCUS CARRIER), +RV CULTURE, CURRENTLY PREGNANT: ICD-10-CM

## 2022-09-05 DIAGNOSIS — Z37.9 NORMAL LABOR: ICD-10-CM

## 2022-09-05 DIAGNOSIS — R82.71 GBS BACTERIURIA: ICD-10-CM

## 2022-09-05 LAB
ABO/RH(D): NORMAL
ANTIBODY SCREEN: NEGATIVE
BASOPHILS # BLD AUTO: 0 10E3/UL (ref 0–0.2)
BASOPHILS NFR BLD AUTO: 0 %
CREAT SERPL-MCNC: 0.59 MG/DL (ref 0.51–0.95)
EOSINOPHIL # BLD AUTO: 0 10E3/UL (ref 0–0.7)
EOSINOPHIL NFR BLD AUTO: 0 %
ERYTHROCYTE [DISTWIDTH] IN BLOOD BY AUTOMATED COUNT: 13.7 % (ref 10–15)
GFR SERPL CREATININE-BSD FRML MDRD: >90 ML/MIN/1.73M2
HCT VFR BLD AUTO: 38.8 % (ref 35–47)
HGB BLD-MCNC: 12.6 G/DL (ref 11.7–15.7)
IMM GRANULOCYTES # BLD: 0.1 10E3/UL
IMM GRANULOCYTES NFR BLD: 1 %
LYMPHOCYTES # BLD AUTO: 0.8 10E3/UL (ref 0.8–5.3)
LYMPHOCYTES NFR BLD AUTO: 11 %
MCH RBC QN AUTO: 27 PG (ref 26.5–33)
MCHC RBC AUTO-ENTMCNC: 32.5 G/DL (ref 31.5–36.5)
MCV RBC AUTO: 83 FL (ref 78–100)
MONOCYTES # BLD AUTO: 0.5 10E3/UL (ref 0–1.3)
MONOCYTES NFR BLD AUTO: 7 %
NEUTROPHILS # BLD AUTO: 6.1 10E3/UL (ref 1.6–8.3)
NEUTROPHILS NFR BLD AUTO: 81 %
NRBC # BLD AUTO: 0 10E3/UL
NRBC BLD AUTO-RTO: 0 /100
PLATELET # BLD AUTO: 163 10E3/UL (ref 150–450)
PLATELET # BLD AUTO: 174 10E3/UL (ref 150–450)
RBC # BLD AUTO: 4.67 10E6/UL (ref 3.8–5.2)
SPECIMEN EXPIRATION DATE: NORMAL
WBC # BLD AUTO: 7.5 10E3/UL (ref 4–11)

## 2022-09-05 PROCEDURE — 0KQM0ZZ REPAIR PERINEUM MUSCLE, OPEN APPROACH: ICD-10-PCS | Performed by: OBSTETRICS & GYNECOLOGY

## 2022-09-05 PROCEDURE — 250N000009 HC RX 250: Performed by: OBSTETRICS & GYNECOLOGY

## 2022-09-05 PROCEDURE — 250N000011 HC RX IP 250 OP 636: Performed by: OBSTETRICS & GYNECOLOGY

## 2022-09-05 PROCEDURE — 120N000001 HC R&B MED SURG/OB

## 2022-09-05 PROCEDURE — 00HU33Z INSERTION OF INFUSION DEVICE INTO SPINAL CANAL, PERCUTANEOUS APPROACH: ICD-10-PCS | Performed by: OBSTETRICS & GYNECOLOGY

## 2022-09-05 PROCEDURE — 36415 COLL VENOUS BLD VENIPUNCTURE: CPT | Performed by: OBSTETRICS & GYNECOLOGY

## 2022-09-05 PROCEDURE — 258N000003 HC RX IP 258 OP 636: Performed by: OBSTETRICS & GYNECOLOGY

## 2022-09-05 PROCEDURE — 3E0R3BZ INTRODUCTION OF ANESTHETIC AGENT INTO SPINAL CANAL, PERCUTANEOUS APPROACH: ICD-10-PCS | Performed by: OBSTETRICS & GYNECOLOGY

## 2022-09-05 PROCEDURE — 59400 OBSTETRICAL CARE: CPT | Performed by: OBSTETRICS & GYNECOLOGY

## 2022-09-05 PROCEDURE — 85049 AUTOMATED PLATELET COUNT: CPT | Performed by: OBSTETRICS & GYNECOLOGY

## 2022-09-05 PROCEDURE — 86780 TREPONEMA PALLIDUM: CPT | Performed by: OBSTETRICS & GYNECOLOGY

## 2022-09-05 PROCEDURE — 85025 COMPLETE CBC W/AUTO DIFF WBC: CPT | Performed by: OBSTETRICS & GYNECOLOGY

## 2022-09-05 PROCEDURE — 250N000011 HC RX IP 250 OP 636: Performed by: NURSE ANESTHETIST, CERTIFIED REGISTERED

## 2022-09-05 PROCEDURE — 82565 ASSAY OF CREATININE: CPT | Performed by: OBSTETRICS & GYNECOLOGY

## 2022-09-05 PROCEDURE — 250N000009 HC RX 250: Performed by: NURSE ANESTHETIST, CERTIFIED REGISTERED

## 2022-09-05 PROCEDURE — 370N000003 HC ANESTHESIA WARD SERVICE

## 2022-09-05 PROCEDURE — 10907ZC DRAINAGE OF AMNIOTIC FLUID, THERAPEUTIC FROM PRODUCTS OF CONCEPTION, VIA NATURAL OR ARTIFICIAL OPENING: ICD-10-PCS | Performed by: OBSTETRICS & GYNECOLOGY

## 2022-09-05 PROCEDURE — 88307 TISSUE EXAM BY PATHOLOGIST: CPT | Mod: 26 | Performed by: PATHOLOGY

## 2022-09-05 PROCEDURE — 86850 RBC ANTIBODY SCREEN: CPT | Performed by: OBSTETRICS & GYNECOLOGY

## 2022-09-05 PROCEDURE — 86901 BLOOD TYPING SEROLOGIC RH(D): CPT | Performed by: OBSTETRICS & GYNECOLOGY

## 2022-09-05 PROCEDURE — 88307 TISSUE EXAM BY PATHOLOGIST: CPT | Mod: TC | Performed by: OBSTETRICS & GYNECOLOGY

## 2022-09-05 PROCEDURE — 722N000001 HC LABOR CARE VAGINAL DELIVERY SINGLE

## 2022-09-05 RX ORDER — NALOXONE HYDROCHLORIDE 0.4 MG/ML
0.4 INJECTION, SOLUTION INTRAMUSCULAR; INTRAVENOUS; SUBCUTANEOUS
Status: DISCONTINUED | OUTPATIENT
Start: 2022-09-05 | End: 2022-09-05 | Stop reason: HOSPADM

## 2022-09-05 RX ORDER — METOCLOPRAMIDE 10 MG/1
10 TABLET ORAL EVERY 6 HOURS PRN
Status: DISCONTINUED | OUTPATIENT
Start: 2022-09-05 | End: 2022-09-05 | Stop reason: HOSPADM

## 2022-09-05 RX ORDER — TRANEXAMIC ACID 10 MG/ML
1 INJECTION, SOLUTION INTRAVENOUS EVERY 30 MIN PRN
Status: DISCONTINUED | OUTPATIENT
Start: 2022-09-05 | End: 2022-09-07 | Stop reason: HOSPADM

## 2022-09-05 RX ORDER — MISOPROSTOL 200 UG/1
400 TABLET ORAL
Status: DISCONTINUED | OUTPATIENT
Start: 2022-09-05 | End: 2022-09-05 | Stop reason: HOSPADM

## 2022-09-05 RX ORDER — HYDROCORTISONE 25 MG/G
CREAM TOPICAL 3 TIMES DAILY PRN
Status: DISCONTINUED | OUTPATIENT
Start: 2022-09-05 | End: 2022-09-07 | Stop reason: HOSPADM

## 2022-09-05 RX ORDER — NALOXONE HYDROCHLORIDE 0.4 MG/ML
0.2 INJECTION, SOLUTION INTRAMUSCULAR; INTRAVENOUS; SUBCUTANEOUS
Status: DISCONTINUED | OUTPATIENT
Start: 2022-09-05 | End: 2022-09-05 | Stop reason: HOSPADM

## 2022-09-05 RX ORDER — OXYTOCIN/0.9 % SODIUM CHLORIDE 30/500 ML
340 PLASTIC BAG, INJECTION (ML) INTRAVENOUS CONTINUOUS PRN
Status: DISCONTINUED | OUTPATIENT
Start: 2022-09-05 | End: 2022-09-05 | Stop reason: HOSPADM

## 2022-09-05 RX ORDER — ACETAMINOPHEN 325 MG/1
650 TABLET ORAL EVERY 4 HOURS PRN
Status: DISCONTINUED | OUTPATIENT
Start: 2022-09-05 | End: 2022-09-07 | Stop reason: HOSPADM

## 2022-09-05 RX ORDER — OXYTOCIN/0.9 % SODIUM CHLORIDE 30/500 ML
340 PLASTIC BAG, INJECTION (ML) INTRAVENOUS CONTINUOUS PRN
Status: DISCONTINUED | OUTPATIENT
Start: 2022-09-05 | End: 2022-09-05

## 2022-09-05 RX ORDER — BISACODYL 10 MG
10 SUPPOSITORY, RECTAL RECTAL DAILY PRN
Status: DISCONTINUED | OUTPATIENT
Start: 2022-09-05 | End: 2022-09-05

## 2022-09-05 RX ORDER — MODIFIED LANOLIN
OINTMENT (GRAM) TOPICAL
Status: DISCONTINUED | OUTPATIENT
Start: 2022-09-05 | End: 2022-09-05

## 2022-09-05 RX ORDER — METHYLERGONOVINE MALEATE 0.2 MG/ML
200 INJECTION INTRAVENOUS
Status: DISCONTINUED | OUTPATIENT
Start: 2022-09-05 | End: 2022-09-07 | Stop reason: HOSPADM

## 2022-09-05 RX ORDER — OXYTOCIN/0.9 % SODIUM CHLORIDE 30/500 ML
100-340 PLASTIC BAG, INJECTION (ML) INTRAVENOUS CONTINUOUS PRN
Status: DISCONTINUED | OUTPATIENT
Start: 2022-09-05 | End: 2022-09-05

## 2022-09-05 RX ORDER — OXYTOCIN/0.9 % SODIUM CHLORIDE 30/500 ML
340 PLASTIC BAG, INJECTION (ML) INTRAVENOUS CONTINUOUS PRN
Status: DISCONTINUED | OUTPATIENT
Start: 2022-09-05 | End: 2022-09-07 | Stop reason: HOSPADM

## 2022-09-05 RX ORDER — ENOXAPARIN SODIUM 100 MG/ML
40 INJECTION SUBCUTANEOUS EVERY 24 HOURS
Status: DISCONTINUED | OUTPATIENT
Start: 2022-09-05 | End: 2022-09-05

## 2022-09-05 RX ORDER — MISOPROSTOL 200 UG/1
800 TABLET ORAL
Status: DISCONTINUED | OUTPATIENT
Start: 2022-09-05 | End: 2022-09-05 | Stop reason: HOSPADM

## 2022-09-05 RX ORDER — METOCLOPRAMIDE HYDROCHLORIDE 5 MG/ML
10 INJECTION INTRAMUSCULAR; INTRAVENOUS EVERY 6 HOURS PRN
Status: DISCONTINUED | OUTPATIENT
Start: 2022-09-05 | End: 2022-09-05 | Stop reason: HOSPADM

## 2022-09-05 RX ORDER — MODIFIED LANOLIN
OINTMENT (GRAM) TOPICAL
Status: DISCONTINUED | OUTPATIENT
Start: 2022-09-05 | End: 2022-09-07 | Stop reason: HOSPADM

## 2022-09-05 RX ORDER — MISOPROSTOL 200 UG/1
800 TABLET ORAL
Status: DISCONTINUED | OUTPATIENT
Start: 2022-09-05 | End: 2022-09-05

## 2022-09-05 RX ORDER — NALOXONE HYDROCHLORIDE 0.4 MG/ML
0.2 INJECTION, SOLUTION INTRAMUSCULAR; INTRAVENOUS; SUBCUTANEOUS
Status: DISCONTINUED | OUTPATIENT
Start: 2022-09-05 | End: 2022-09-07 | Stop reason: HOSPADM

## 2022-09-05 RX ORDER — IBUPROFEN 800 MG/1
800 TABLET, FILM COATED ORAL
Status: DISCONTINUED | OUTPATIENT
Start: 2022-09-05 | End: 2022-09-05

## 2022-09-05 RX ORDER — TRANEXAMIC ACID 10 MG/ML
1 INJECTION, SOLUTION INTRAVENOUS EVERY 30 MIN PRN
Status: DISCONTINUED | OUTPATIENT
Start: 2022-09-05 | End: 2022-09-05 | Stop reason: HOSPADM

## 2022-09-05 RX ORDER — FENTANYL CITRATE 50 UG/ML
INJECTION, SOLUTION INTRAMUSCULAR; INTRAVENOUS PRN
Status: DISCONTINUED | OUTPATIENT
Start: 2022-09-05 | End: 2022-09-05

## 2022-09-05 RX ORDER — MISOPROSTOL 200 UG/1
800 TABLET ORAL
Status: DISCONTINUED | OUTPATIENT
Start: 2022-09-05 | End: 2022-09-07 | Stop reason: HOSPADM

## 2022-09-05 RX ORDER — ONDANSETRON 2 MG/ML
4 INJECTION INTRAMUSCULAR; INTRAVENOUS EVERY 6 HOURS PRN
Status: DISCONTINUED | OUTPATIENT
Start: 2022-09-05 | End: 2022-09-05 | Stop reason: HOSPADM

## 2022-09-05 RX ORDER — PROCHLORPERAZINE MALEATE 10 MG
10 TABLET ORAL EVERY 6 HOURS PRN
Status: DISCONTINUED | OUTPATIENT
Start: 2022-09-05 | End: 2022-09-05 | Stop reason: HOSPADM

## 2022-09-05 RX ORDER — NALOXONE HYDROCHLORIDE 0.4 MG/ML
0.4 INJECTION, SOLUTION INTRAMUSCULAR; INTRAVENOUS; SUBCUTANEOUS
Status: DISCONTINUED | OUTPATIENT
Start: 2022-09-05 | End: 2022-09-07 | Stop reason: HOSPADM

## 2022-09-05 RX ORDER — PROCHLORPERAZINE 25 MG
25 SUPPOSITORY, RECTAL RECTAL EVERY 12 HOURS PRN
Status: DISCONTINUED | OUTPATIENT
Start: 2022-09-05 | End: 2022-09-05 | Stop reason: HOSPADM

## 2022-09-05 RX ORDER — ACETAMINOPHEN 325 MG/1
650 TABLET ORAL EVERY 4 HOURS PRN
Status: DISCONTINUED | OUTPATIENT
Start: 2022-09-05 | End: 2022-09-05

## 2022-09-05 RX ORDER — CARBOPROST TROMETHAMINE 250 UG/ML
250 INJECTION, SOLUTION INTRAMUSCULAR
Status: DISCONTINUED | OUTPATIENT
Start: 2022-09-05 | End: 2022-09-05

## 2022-09-05 RX ORDER — CETIRIZINE HYDROCHLORIDE 5 MG/1
10 TABLET ORAL DAILY
Status: DISCONTINUED | OUTPATIENT
Start: 2022-09-06 | End: 2022-09-07 | Stop reason: HOSPADM

## 2022-09-05 RX ORDER — BUPIVACAINE HYDROCHLORIDE 2.5 MG/ML
INJECTION, SOLUTION EPIDURAL; INFILTRATION; INTRACAUDAL PRN
Status: DISCONTINUED | OUTPATIENT
Start: 2022-09-05 | End: 2022-09-05

## 2022-09-05 RX ORDER — MISOPROSTOL 200 UG/1
400 TABLET ORAL
Status: DISCONTINUED | OUTPATIENT
Start: 2022-09-05 | End: 2022-09-05

## 2022-09-05 RX ORDER — SODIUM CHLORIDE, SODIUM LACTATE, POTASSIUM CHLORIDE, CALCIUM CHLORIDE 600; 310; 30; 20 MG/100ML; MG/100ML; MG/100ML; MG/100ML
INJECTION, SOLUTION INTRAVENOUS CONTINUOUS
Status: DISCONTINUED | OUTPATIENT
Start: 2022-09-05 | End: 2022-09-05

## 2022-09-05 RX ORDER — TRANEXAMIC ACID 10 MG/ML
1 INJECTION, SOLUTION INTRAVENOUS EVERY 30 MIN PRN
Status: DISCONTINUED | OUTPATIENT
Start: 2022-09-05 | End: 2022-09-05

## 2022-09-05 RX ORDER — IBUPROFEN 800 MG/1
800 TABLET, FILM COATED ORAL EVERY 6 HOURS PRN
Status: DISCONTINUED | OUTPATIENT
Start: 2022-09-05 | End: 2022-09-07 | Stop reason: HOSPADM

## 2022-09-05 RX ORDER — NALBUPHINE HYDROCHLORIDE 10 MG/ML
2.5-5 INJECTION, SOLUTION INTRAMUSCULAR; INTRAVENOUS; SUBCUTANEOUS EVERY 6 HOURS PRN
Status: DISCONTINUED | OUTPATIENT
Start: 2022-09-05 | End: 2022-09-05

## 2022-09-05 RX ORDER — OXYTOCIN 10 [USP'U]/ML
10 INJECTION, SOLUTION INTRAMUSCULAR; INTRAVENOUS
Status: DISCONTINUED | OUTPATIENT
Start: 2022-09-05 | End: 2022-09-05

## 2022-09-05 RX ORDER — OXYTOCIN 10 [USP'U]/ML
10 INJECTION, SOLUTION INTRAMUSCULAR; INTRAVENOUS
Status: DISCONTINUED | OUTPATIENT
Start: 2022-09-05 | End: 2022-09-07 | Stop reason: HOSPADM

## 2022-09-05 RX ORDER — CARBOPROST TROMETHAMINE 250 UG/ML
250 INJECTION, SOLUTION INTRAMUSCULAR
Status: DISCONTINUED | OUTPATIENT
Start: 2022-09-05 | End: 2022-09-07 | Stop reason: HOSPADM

## 2022-09-05 RX ORDER — KETOROLAC TROMETHAMINE 30 MG/ML
30 INJECTION, SOLUTION INTRAMUSCULAR; INTRAVENOUS
Status: DISCONTINUED | OUTPATIENT
Start: 2022-09-05 | End: 2022-09-05

## 2022-09-05 RX ORDER — ONDANSETRON 4 MG/1
4 TABLET, ORALLY DISINTEGRATING ORAL EVERY 6 HOURS PRN
Status: DISCONTINUED | OUTPATIENT
Start: 2022-09-05 | End: 2022-09-05 | Stop reason: HOSPADM

## 2022-09-05 RX ORDER — HYDROCORTISONE 25 MG/G
CREAM TOPICAL 3 TIMES DAILY PRN
Status: DISCONTINUED | OUTPATIENT
Start: 2022-09-05 | End: 2022-09-05

## 2022-09-05 RX ORDER — PENICILLIN G 3000000 [IU]/50ML
3 INJECTION, SOLUTION INTRAVENOUS EVERY 4 HOURS
Status: DISCONTINUED | OUTPATIENT
Start: 2022-09-05 | End: 2022-09-05 | Stop reason: HOSPADM

## 2022-09-05 RX ORDER — DOCUSATE SODIUM 100 MG/1
100 CAPSULE, LIQUID FILLED ORAL DAILY
Status: DISCONTINUED | OUTPATIENT
Start: 2022-09-05 | End: 2022-09-05

## 2022-09-05 RX ORDER — ENOXAPARIN SODIUM 100 MG/ML
40 INJECTION SUBCUTANEOUS EVERY 24 HOURS
Status: DISCONTINUED | OUTPATIENT
Start: 2022-09-06 | End: 2022-09-07 | Stop reason: HOSPADM

## 2022-09-05 RX ORDER — METHYLERGONOVINE MALEATE 0.2 MG/ML
200 INJECTION INTRAVENOUS
Status: DISCONTINUED | OUTPATIENT
Start: 2022-09-05 | End: 2022-09-05 | Stop reason: HOSPADM

## 2022-09-05 RX ORDER — MISOPROSTOL 200 UG/1
400 TABLET ORAL
Status: DISCONTINUED | OUTPATIENT
Start: 2022-09-05 | End: 2022-09-07 | Stop reason: HOSPADM

## 2022-09-05 RX ORDER — LIDOCAINE HYDROCHLORIDE AND EPINEPHRINE 15; 5 MG/ML; UG/ML
INJECTION, SOLUTION EPIDURAL PRN
Status: DISCONTINUED | OUTPATIENT
Start: 2022-09-05 | End: 2022-09-05

## 2022-09-05 RX ORDER — METHYLERGONOVINE MALEATE 0.2 MG/ML
200 INJECTION INTRAVENOUS
Status: DISCONTINUED | OUTPATIENT
Start: 2022-09-05 | End: 2022-09-05

## 2022-09-05 RX ORDER — PENICILLIN G POTASSIUM 5000000 [IU]/1
5 INJECTION, POWDER, FOR SOLUTION INTRAMUSCULAR; INTRAVENOUS ONCE
Status: COMPLETED | OUTPATIENT
Start: 2022-09-05 | End: 2022-09-05

## 2022-09-05 RX ORDER — CARBOPROST TROMETHAMINE 250 UG/ML
250 INJECTION, SOLUTION INTRAMUSCULAR
Status: DISCONTINUED | OUTPATIENT
Start: 2022-09-05 | End: 2022-09-05 | Stop reason: HOSPADM

## 2022-09-05 RX ORDER — CITRIC ACID/SODIUM CITRATE 334-500MG
30 SOLUTION, ORAL ORAL
Status: DISCONTINUED | OUTPATIENT
Start: 2022-09-05 | End: 2022-09-05 | Stop reason: HOSPADM

## 2022-09-05 RX ORDER — EPHEDRINE SULFATE 50 MG/ML
5 INJECTION, SOLUTION INTRAMUSCULAR; INTRAVENOUS; SUBCUTANEOUS
Status: DISCONTINUED | OUTPATIENT
Start: 2022-09-05 | End: 2022-09-05 | Stop reason: HOSPADM

## 2022-09-05 RX ORDER — OXYCODONE HYDROCHLORIDE 5 MG/1
5 TABLET ORAL EVERY 4 HOURS PRN
Status: DISCONTINUED | OUTPATIENT
Start: 2022-09-05 | End: 2022-09-07 | Stop reason: HOSPADM

## 2022-09-05 RX ORDER — DOCUSATE SODIUM 100 MG/1
100 CAPSULE, LIQUID FILLED ORAL DAILY
Status: DISCONTINUED | OUTPATIENT
Start: 2022-09-05 | End: 2022-09-07 | Stop reason: HOSPADM

## 2022-09-05 RX ORDER — IBUPROFEN 800 MG/1
800 TABLET, FILM COATED ORAL EVERY 6 HOURS PRN
Status: DISCONTINUED | OUTPATIENT
Start: 2022-09-05 | End: 2022-09-05

## 2022-09-05 RX ORDER — OXYTOCIN 10 [USP'U]/ML
10 INJECTION, SOLUTION INTRAMUSCULAR; INTRAVENOUS
Status: DISCONTINUED | OUTPATIENT
Start: 2022-09-05 | End: 2022-09-05 | Stop reason: HOSPADM

## 2022-09-05 RX ORDER — BISACODYL 10 MG
10 SUPPOSITORY, RECTAL RECTAL DAILY PRN
Status: DISCONTINUED | OUTPATIENT
Start: 2022-09-05 | End: 2022-09-07 | Stop reason: HOSPADM

## 2022-09-05 RX ADMIN — PENICILLIN G POTASSIUM 5 MILLION UNITS: 5000000 POWDER, FOR SOLUTION INTRAMUSCULAR; INTRAPLEURAL; INTRATHECAL; INTRAVENOUS at 10:28

## 2022-09-05 RX ADMIN — Medication 12 ML/HR: at 11:16

## 2022-09-05 RX ADMIN — ONDANSETRON 4 MG: 2 INJECTION INTRAMUSCULAR; INTRAVENOUS at 13:48

## 2022-09-05 RX ADMIN — BUPIVACAINE HYDROCHLORIDE 5 ML: 2.5 INJECTION, SOLUTION EPIDURAL; INFILTRATION; INTRACAUDAL at 11:15

## 2022-09-05 RX ADMIN — LIDOCAINE HYDROCHLORIDE AND EPINEPHRINE 4 ML: 15; 5 INJECTION, SOLUTION EPIDURAL at 11:10

## 2022-09-05 RX ADMIN — Medication 340 ML/HR: at 16:44

## 2022-09-05 RX ADMIN — FENTANYL CITRATE 100 MCG: 50 INJECTION, SOLUTION INTRAMUSCULAR; INTRAVENOUS at 11:13

## 2022-09-05 RX ADMIN — PENICILLIN G 3 MILLION UNITS: 3000000 INJECTION, SOLUTION INTRAVENOUS at 14:40

## 2022-09-05 RX ADMIN — BUPIVACAINE HYDROCHLORIDE 5 ML: 2.5 INJECTION, SOLUTION EPIDURAL; INFILTRATION; INTRACAUDAL at 11:13

## 2022-09-05 RX ADMIN — Medication 5 MG: at 11:22

## 2022-09-05 RX ADMIN — SODIUM CHLORIDE, POTASSIUM CHLORIDE, SODIUM LACTATE AND CALCIUM CHLORIDE 1000 ML: 600; 310; 30; 20 INJECTION, SOLUTION INTRAVENOUS at 10:25

## 2022-09-05 RX ADMIN — SODIUM CHLORIDE, POTASSIUM CHLORIDE, SODIUM LACTATE AND CALCIUM CHLORIDE: 600; 310; 30; 20 INJECTION, SOLUTION INTRAVENOUS at 12:22

## 2022-09-05 ASSESSMENT — ACTIVITIES OF DAILY LIVING (ADL)
ADLS_ACUITY_SCORE: 23
WEAR_GLASSES_OR_BLIND: YES
DIFFICULTY_EATING/SWALLOWING: NO
WALKING_OR_CLIMBING_STAIRS_DIFFICULTY: NO
FALL_HISTORY_WITHIN_LAST_SIX_MONTHS: NO
ADLS_ACUITY_SCORE: 20
DRESSING/BATHING_DIFFICULTY: NO
ADLS_ACUITY_SCORE: 20
ADLS_ACUITY_SCORE: 20
CONCENTRATING,_REMEMBERING_OR_MAKING_DECISIONS_DIFFICULTY: NO
TOILETING_ISSUES: NO
VISION_MANAGEMENT: CONTACTS
CHANGE_IN_FUNCTIONAL_STATUS_SINCE_ONSET_OF_CURRENT_ILLNESS/INJURY: NO
ADLS_ACUITY_SCORE: 20
DOING_ERRANDS_INDEPENDENTLY_DIFFICULTY: NO
ADLS_ACUITY_SCORE: 20
ADLS_ACUITY_SCORE: 20

## 2022-09-05 NOTE — L&D DELIVERY NOTE
"Teresa Salinas is a 37 year old  @ 39+5 Weeks EGA  She presented with active labor @ 08:00 on 2022  Pregnancy was complicated by a circumvallate placenta. The interval growth was normal.  She is Rh negative s/p Rhogam in   GBS Positive  Membrane status: AROM @ 14:56 on 2022  Labored spontaneously  Pain meds Epidural   FHR Cat II  Delivered a viable Female  @ 16:35 over intact perineum  Weight # oz   APGAR's 7/8  Placenta delivered @ 16:39 , was complete with a 3vessel cord  Laceration 2nd degree repaired  Repaired with 3/O vicryl    ml  \"Mary\"    Nicolas Turner MD    "

## 2022-09-05 NOTE — PLAN OF CARE
S: Patient and [facility] to [facility] with painful contractions every 2 minutes since 0600  B:  at 39w5d, GBS and Covid positive  A: , category 1, ctx every 2-3, patient breathing well with ctx, SVE 5/80/-2  R: Admiited for labor. Patient requesting epidural for pain, Dr Turner and CRNA notified. Report given to Minnie DURBIN

## 2022-09-05 NOTE — H&P
[facility] Labor and Delivery History and Physical    Teresa Salinas MRN# [id number]   Age: 37 year old YOB: 1985     Date of Admission:  2022    Primary care provider: Kacie Vazquez           Chief Complaint:   Teresa Salinas is a 37 year old female who is 39w5d pregnant and being admitted for active labor management and antibiotic therapy.          Pregnancy history:   Her pregnancy has been complicated by recent Covid infection. Previously vaccinated and Boosted. Unable to access Paxlovid  Rh negative status post rhogam in    GBS positive culture w/ allergy to Clindamycin  Labored at home this morning and presented intact @ 5 cm dilation  OBSTETRIC HISTORY:    OB History    Para Term  AB Living   2 1 1 0 0 1   SAB IAB Ectopic Multiple Live Births   0 0 0 0 1      # Outcome Date GA Lbr Krishna/2nd Weight Sex Delivery Anes PTL Lv   2 Current            1 Term 19 40w0d 11:00 / 04:13 3.997 kg (8 lb 13 oz) M Vag-Spont EPI N AUSTIN      Complications: GBS      Name: Adan      Apgar1: 8  Apgar5: 9       EDC: Estimated Date of Delivery: 22    Prenatal Labs:   Lab Results   Component Value Date    ABO A 09/15/2019    RH Neg 09/15/2019    AS Negative 2022    HEPBANG Nonreactive 2022    CHPCRT Negative 2019    GCPCRT Negative 2019    HGB 11.3 (L) 2022       GBS Status:   No results found for: GBS    Active Problem List  Patient Active Problem List   Diagnosis     Allergic rhinitis due to dust mite     Mild intermittent asthma     Family history of thyroid disease     Allergic rhinitis due to pollen     Rh negative state in antepartum period     GBS bacteriuria     Prenatal care, subsequent pregnancy     Circumvallate placenta in second trimester     Cramping affecting pregnancy, antepartum     Normal labor     GBS (group B Streptococcus carrier), +RV culture, currently pregnant     Infection due to 2019 novel  coronavirus       Medication Prior to Admission  Medications Prior to Admission   Medication Sig Dispense Refill Last Dose     cetirizine (ZYRTEC) 10 MG tablet Take 10 mg by mouth daily  30 tablet 1      Cholecalciferol (VITAMIN D) 2000 UNITS tablet Take 2,000 Units by mouth daily 100 tablet 3      Prenatal Vit-Fe Fumarate-FA (PRENATAL MULTIVITAMIN W/IRON) 27-0.8 MG tablet Take 1 tablet by mouth daily        RABEprazole (ACIPHEX) 20 MG EC tablet Take 1 tablet (20 mg) by mouth daily 30 tablet 3    .        Maternal Past Medical History:     Past Medical History:   Diagnosis Date     Allergic rhinitis 3/11/2014     Asthma 3/11/2014     Chickenpox      Depression      Family history of thyroid disease 6/12/2015                       Family History:   I have reviewed this patient's family history            Social History:   I have reviewed this patient's social history         Review of Systems:   The Review of Systems is negative other than noted in the HPI          Physical Exam:   Vitals were reviewed  All vitals stable  Constitutional:   awake, alert, cooperative, no apparent distress, and appears stated age     Lungs:   No increased work of breathing, good air exchange, clear to auscultation bilaterally, no crackles or wheezing     Cardiovascular:   Normal apical impulse, regular rate and rhythm, normal S1 and S2, no S3 or S4, and no murmur noted     Abdomen:   No scars, normal bowel sounds, soft, non-distended, non-tender, no masses palpated, no hepatosplenomegaly and Gravid     Genitounirinary:   External Genitalia:  General appearance; normal     Musculoskeletal:   There is no redness, warmth, or swelling of the joints.  Full range of motion noted.  Motor strength is 5 out of 5 all extremities bilaterally.  Tone is normal.     Neurologic:   Awake, alert, oriented to name, place and time.  Cranial nerves II-XII are grossly intact.  Motor is 5 out of 5 bilaterally.  Cerebellar finger to nose, heel to shin intact.   Sensory is intact.  Babinski down going, Romberg negative, and gait is normal.     Neuropsychiatric:   General: normal, calm and normal eye contact  Level of consciousness: alert / normal  Affect: normal  Orientation: oriented to self, place, time and situation  Memory and insight: normal, memory for past and recent events intact and thought process normal     Skin:   no bruising or bleeding, normal skin color, texture, turgor and no redness, warmth, or swelling      Cervix:   Membranes: AROM @ 14:56 > 2 hours after Penicillin    Dilation: 7   Effacement: 90%   Station:0   Consistency: soft   Position: Mid  Presentation:Cephalic  Fetal Heart Rate Tracing: reactive and reassuring, Tier 1 (normal)  Tocometer: external monitor and adequate                       Assessment:   Teresa Salinas is a 39w5d pregnant female admitted with active labor management, antibiotic therapy and Covid isolation.          Plan:   Covid Isolation  Admit - see IP orders  Pain medication Epidural  Prophylactic antibiotic for + GBS status  Anticipate     Nicolas Turner MD

## 2022-09-05 NOTE — ANESTHESIA PROCEDURE NOTES
Epidural catheter Procedure Note    Pre-Procedure   Staff -        CRNA: Emelina Ulloa APRN CRNA       Performed By: CRNA       Location: OB       Pre-Anesthestic Checklist: patient identified, IV checked, risks and benefits discussed, informed consent, monitors and equipment checked, pre-op evaluation, at physician/surgeon's request and post-op pain management  Timeout:       Correct Patient: Yes        Correct Procedure: Yes        Correct Site: Yes        Correct Position: Yes   Procedure Documentation  Procedure: epidural catheter       Patient Position: sitting       Patient Prep/Sterile Barriers: sterile gloves, mask, patient draped       Skin prep: Chloraprep       Local skin infiltrated with 10 mL of 1% lidocaine.        Insertion Site: L3-4. (midline approach).       Technique: LORT saline and LORT air        BRUNO at 7 cm.       Needle Type: [employer] needle       Needle Gauge: 17.        Needle Length (Inches): 3.5        Catheter: 19 G.          Catheter threaded easily.         5 cm epidural space.         Threaded 12 cm at skin.         # of attempts: 1 and  # of redirects:  2    Assessment/Narrative         Paresthesias: No.       Test dose of 4 mL lidocaine 1.5% w/ 1:200,000 epinephrine at 11:10 CDT.         Test dose negative, 3 minutes after injection, for signs of intravascular, subdural, or intrathecal injection.       Insertion/Infusion Method: LORT saline and LORT air       Aspiration negative for Heme or CSF via Epidural Catheter.     Comments:  Pain prior to epidural: 7/10  Pain after epidural: 0/10    VSS, FHT stable throughout procedure

## 2022-09-05 NOTE — ANESTHESIA PREPROCEDURE EVALUATION
Anesthesia Pre-Procedure Evaluation    Patient: Teresa Salinas   MRN: [id number] : 1985        Procedure : * No procedures listed *          Past Medical History:   Diagnosis Date     Allergic rhinitis 3/11/2014     Asthma 3/11/2014     Chickenpox      Depression      Family history of thyroid disease 2015      Past Surgical History:   Procedure Laterality Date     Arthoscopic right ankle Right      OSTEOTOMY ANKLE Right       Allergies   Allergen Reactions     Contrast Dye Shortness Of Breath     Clindamycin Hives      Social History     Tobacco Use     Smoking status: Never Smoker     Smokeless tobacco: Never Used   Substance Use Topics     Alcohol use: No     Alcohol/week: 0.0 standard drinks      Wt Readings from Last 1 Encounters:   22 88.1 kg (194 lb 3.2 oz)        Anesthesia Evaluation   Pt has had prior anesthetic. Type: General.        ROS/MED HX  ENT/Pulmonary:     (+) NAOMI risk factors, Intermittent, asthma     Neurologic:  - neg neurologic ROS     Cardiovascular:  - neg cardiovascular ROS     METS/Exercise Tolerance:     Hematologic:  - neg hematologic  ROS     Musculoskeletal:  - neg musculoskeletal ROS     GI/Hepatic:  - neg GI/hepatic ROS     Renal/Genitourinary:  - neg Renal ROS     Endo:     (+) Obesity,     Psychiatric/Substance Use:     (+) psychiatric history depression     Infectious Disease:  - neg infectious disease ROS     Malignancy:  - neg malignancy ROS     Other:  - neg other ROS          Physical Exam    Airway        Mallampati: II   TM distance: > 3 FB   Neck ROM: full   Mouth opening: > 3 cm    Respiratory Devices and Support         Dental  no notable dental history         Cardiovascular   cardiovascular exam normal          Pulmonary   pulmonary exam normal                OUTSIDE LABS:  CBC:   Lab Results   Component Value Date    WBC 7.7 2022    WBC 7.0 2022    HGB 11.3 (L) 2022    HGB 13.6 2022    HCT 34.2 (L) 2022     HCT 40.9 01/26/2022     05/18/2022     01/26/2022     BMP:   Lab Results   Component Value Date    GLC 87 06/12/2015     COAGS: No results found for: PTT, INR, FIBR  POC: No results found for: BGM, HCG, HCGS  HEPATIC: No results found for: ALBUMIN, PROTTOTAL, ALT, AST, GGT, ALKPHOS, BILITOTAL, BILIDIRECT, JAG  OTHER:   Lab Results   Component Value Date    TSH 1.38 06/12/2015    T4 1.05 06/12/2015       Anesthesia Plan    ASA Status:  2      Anesthesia Type: Epidural.              Consents    Anesthesia Plan(s) and associated risks, benefits, and realistic alternatives discussed. Questions answered and patient/representative(s) expressed understanding.     - Discussed: Risks, Benefits and Alternatives for the PROCEDURE were discussed     - Discussed with:  Patient         Postoperative Care    Pain management: IV analgesics, Oral pain medications, Multi-modal analgesia, Neuraxial analgesia.   PONV prophylaxis: Ondansetron (or other 5HT-3), Dexamethasone or Solumedrol     Comments:                BETTINA Montejo CRNA

## 2022-09-05 NOTE — PROGRESS NOTES
Pt needed one dose of ephedrine after the epidural.  She had emesis x2, otherwise tolerated the procedure well.  Covid precautions are being followed.

## 2022-09-05 NOTE — PLAN OF CARE
S:Delivery  B:Spontaneous Labor,39w5d    GBS + with antibiotic treatment greater than or equal to 4 hours prior to delivery.  A: Patient delivered  with a 2nd degree laceration at 1635 with Dr. TRIXIE Turner in attendance and baby placed on mother's abdomen for delayed cord clamping. Baby dried and stimulated. Baby placed  skin to skin @ 1635.. Apgars 7/8.Delivery .  IV infusion of Oxytocin  infused. Placenta removal spontaneous. MD does want placenta sent to pathology.  See Flowsheet for VS and PP checks. Labor care plan goals met, transition now to postpartum care.  R: Expect routine postpartum care. Anticipate first feeding within the hour or whenever infant displays feeding cues. Continue skin to skin. Prior discussion with mother indicates that feeding plan is Breast feeding . Educated mother on importance of exclusive breastfeeding, expected feeding readiness cues and encouraged her to observe for these cues while rooming in. Informed her that breastfeeding assistance would be provided.

## 2022-09-06 PROBLEM — O43.112 CIRCUMVALLATE PLACENTA IN SECOND TRIMESTER: Status: RESOLVED | Noted: 2022-05-05 | Resolved: 2022-09-06

## 2022-09-06 PROBLEM — O26.899 CRAMPING AFFECTING PREGNANCY, ANTEPARTUM: Status: RESOLVED | Noted: 2022-07-17 | Resolved: 2022-09-06

## 2022-09-06 PROBLEM — R10.9 CRAMPING AFFECTING PREGNANCY, ANTEPARTUM: Status: RESOLVED | Noted: 2022-07-17 | Resolved: 2022-09-06

## 2022-09-06 PROBLEM — Z37.9 NORMAL LABOR: Status: RESOLVED | Noted: 2022-09-05 | Resolved: 2022-09-06

## 2022-09-06 PROBLEM — Z34.80 PRENATAL CARE, SUBSEQUENT PREGNANCY: Status: RESOLVED | Noted: 2021-12-30 | Resolved: 2022-09-06

## 2022-09-06 LAB — T PALLIDUM AB SER QL: NONREACTIVE

## 2022-09-06 PROCEDURE — 250N000013 HC RX MED GY IP 250 OP 250 PS 637: Performed by: OBSTETRICS & GYNECOLOGY

## 2022-09-06 PROCEDURE — 120N000001 HC R&B MED SURG/OB

## 2022-09-06 PROCEDURE — 250N000011 HC RX IP 250 OP 636: Performed by: OBSTETRICS & GYNECOLOGY

## 2022-09-06 RX ADMIN — ENOXAPARIN SODIUM 40 MG: 100 INJECTION SUBCUTANEOUS at 08:57

## 2022-09-06 RX ADMIN — DOCUSATE SODIUM 100 MG: 100 CAPSULE, LIQUID FILLED ORAL at 08:57

## 2022-09-06 RX ADMIN — CETIRIZINE HYDROCHLORIDE 10 MG: 5 TABLET ORAL at 08:57

## 2022-09-06 ASSESSMENT — ACTIVITIES OF DAILY LIVING (ADL)
ADLS_ACUITY_SCORE: 20

## 2022-09-06 NOTE — ASSESSMENT & PLAN NOTE
COVID + 9/3/22, symptom onset 9/2/22  Symptoms improving, did not feel well on the 3rd (cough, fever, nausea, vomiting), but improving to mild cough currently  Lovenox while admitted post partum. After discussion of options, will plan to defer home anticoagulation. Discussed symptoms of VTE and strict return precautions. Encourage ambulation.

## 2022-09-06 NOTE — PROGRESS NOTES
[facility] OB/GYN Department    Post-Partum Progress Note: PPD #1    Name: Teresa Salinas  Date: 9/6/2022    Subjective:   Patient seen and examined.  No complaints.  Pain well controlled on PO medications.  Tolerating regular diet, without nausea or vomiting.  Ambulating and voiding without difficulty.  Lochia moderate.  Breast feeding.     ROS:    General/Constitutional:  Denies chills or fever  Respiratory: Denies shortness of breath, occasional cought  Cardiovascular: Denies chest pain  Gastrointestinal:  +mild uterine cramping, no nausea or vomiting  Genitourinary: Denies difficulty urinating  Musculoskeletal: Denies peripheral edema      Objective:     Intake/Output Summary (Last 24 hours) at 9/6/2022 1125  Last data filed at 9/5/2022 2015  Gross per 24 hour   Intake 1274 ml   Output 518 ml   Net 756 ml       Patient Vitals for the past 24 hrs:   BP Temp Temp src Pulse Resp SpO2   09/06/22 0900 110/64 98.1  F (36.7  C) Oral 77 16 97 %   09/06/22 0003 103/54 98.4  F (36.9  C) Oral 75 16 93 %   09/05/22 2015 106/61 98.5  F (36.9  C) Oral 80 16 98 %   09/05/22 1842 109/60 -- -- -- -- --   09/05/22 1827 102/60 -- -- -- -- --   09/05/22 1757 98/53 -- -- -- -- --   09/05/22 1742 99/55 -- -- -- -- --   09/05/22 1727 110/53 98.5  F (36.9  C) Oral -- 18 --   09/05/22 1712 113/57 -- -- -- -- --   09/05/22 1657 108/55 -- -- -- -- --   09/05/22 1641 135/61 -- -- -- -- --   09/05/22 1630 133/62 -- -- -- -- --   09/05/22 1541 105/62 98.3  F (36.8  C) Oral -- 16 98 %   09/05/22 1500 -- -- -- -- 18 100 %   09/05/22 1440 111/68 -- -- -- 18 99 %   09/05/22 1340 108/65 98.6  F (37  C) Axillary -- 18 100 %   09/05/22 1310 109/64 -- -- -- -- 100 %   09/05/22 1300 -- -- -- -- -- 100 %   09/05/22 1240 104/65 -- -- -- -- 100 %   09/05/22 1227 -- -- -- -- -- 100 %   09/05/22 1210 102/64 -- -- -- 18 100 %   09/05/22 1205 102/64 -- -- -- 18 98 %   09/05/22 1200 104/60 -- -- -- 18 100 %   09/05/22 1155 101/58 -- -- -- 18  98 %   22 1150 101/59 -- -- -- 18 99 %   22 1145 106/58 -- -- -- 18 100 %   22 1140 106/58 98.4  F (36.9  C) Axillary -- 18 100 %   22 1135 101/61 -- -- -- 18 100 %   22 1130 121/71 -- -- -- 18 100 %       Recent Labs   Lab 22  1045   HGB 12.6       General appearance: well-hydrated, A&O x 3, no apparent distress  ENT: EOMI, sclera anicteric   Lungs: Equal expansion bilaterally, no accessory muscle use  Heart: No heaves or thrills. No peripheral varicosities  Constitutional: See vitals  Abdomen: Soft, non-distended, no rebound or rigidity   Uterus: Firm below umbilicus with non-tender fundus   Neurologic: CN II-XII grossly intact, no lateralizing defects, no gross movement abnormalities  Extremities: no edema    Assessment and Plan:    Rh negative state in antepartum period  Rhogam evaluation post partum    Infection due to 2019 novel coronavirus  COVID + 9/3/22, symptom onset 22  Symptoms improving, did not feel well on the 3rd (cough, fever, nausea, vomiting), but improving to mild cough currently  Lovenox started post partum. Discussed pros/cons of continuing anticoagulation post partum. Will consider Lovenox for discharge. 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     (spontaneous vaginal delivery)  PPD#1 s/p   Routine post partum care  Optimize pain management  Perineal care  Lactation support  Anticipate discharge home tomorrow    Ester Watkins DO

## 2022-09-06 NOTE — ANESTHESIA POSTPROCEDURE EVALUATION
Patient: Teresa Salinas    Procedure: * No procedures listed *       Anesthesia Type:  Epidural    Note:  Disposition: Inpatient   Postop Pain Control: Uneventful            Sign Out: Well controlled pain   PONV: No   Neuro/Psych: Uneventful            Sign Out: Acceptable/Baseline neuro status   Airway/Respiratory: Uneventful            Sign Out: Acceptable/Baseline resp. status   CV/Hemodynamics: Uneventful            Sign Out: Acceptable CV status; No obvious hypovolemia; No obvious fluid overload   Other NRE: NONE   DID A NON-ROUTINE EVENT OCCUR? No           Last vitals:  Vitals:    09/05/22 1842 09/05/22 2015 09/06/22 0003   BP: 109/60 106/61 103/54   Pulse:  80 75   Resp:  16 16   Temp:  36.9  C (98.5  F) 36.9  C (98.4  F)   SpO2:  98% 93%       Electronically Signed By: BETTINA Montejo CRNA  September 6, 2022  7:32 AM

## 2022-09-06 NOTE — PLAN OF CARE
"Assessments completed as charted. B/P: 103/54, T: 98.4, P: 75, R: 16. Rates pain: 0/10 pt denies any pain. Voiding without difficulty. Fundus: firm, midline, U/1. Lochia: Light. Activity: normal activity. Infant feeding: Breast feeding and supplementing with donor breast milk.           Postpartum breastfeeding assessment completed and education provided, see Patient Education Activity.  Items included in the education are:   proper positioning and latch  effectiveness of feeding  manual expression  handling and storing breastmilk  maintenance of breastfeeding for the first 6 months  sign/symptoms of infant feeding issues requiring referral to qualified health care provider  Postpartum care education provided, see Patient Education activity. Patient denies any needs. Will continue to monitor.       Problem: Plan of Care - These are the overarching goals to be used throughout the patient stay.    Goal: Plan of Care Review/Shift Note  Description: The Plan of Care Review/Shift note should be completed every shift.  The Outcome Evaluation is a brief statement about your assessment that the patient is improving, declining, or no change.  This information will be displayed automatically on your shift note.  Outcome: Ongoing, Progressing  Flowsheets (Taken 9/6/2022 0355)  Plan of Care Reviewed With: patient  Overall Patient Progress: improving  Goal: Patient-Specific Goal (Individualized)  Description: You can add care plan individualizations to a care plan. Examples of Individualization might be:  \"Parent requests to be called daily at 9am for status\", \"I have a hard time hearing out of my right ear\", or \"Do not touch me to wake me up as it startles me\".  Outcome: Ongoing, Progressing  Goal: Absence of Hospital-Acquired Illness or Injury  Outcome: Ongoing, Progressing  Intervention: Prevent Skin Injury  Recent Flowsheet Documentation  Taken 9/6/2022 0003 by Letty Fontana, RN  Body Position: position changed " independently  Intervention: Prevent and Manage VTE (Venous Thromboembolism) Risk  Recent Flowsheet Documentation  Taken 9/6/2022 0003 by Letty Fontana RN  Activity Management: up ad brigid  Taken 9/5/2022 2015 by Letty Fontana RN  Activity Management:   activity adjusted per tolerance   ambulated to bathroom  Intervention: Prevent Infection  Recent Flowsheet Documentation  Taken 9/6/2022 0003 by Letty Fontana RN  Infection Prevention:   hand hygiene promoted   personal protective equipment utilized   rest/sleep promoted   single patient room provided  Goal: Optimal Comfort and Wellbeing  Outcome: Ongoing, Progressing  Intervention: Provide Person-Centered Care  Recent Flowsheet Documentation  Taken 9/6/2022 0003 by Letty Fontana RN  Trust Relationship/Rapport:   care explained   choices provided   questions answered   questions encouraged   reassurance provided   thoughts/feelings acknowledged  Goal: Readiness for Transition of Care  Outcome: Ongoing, Progressing   Goal Outcome Evaluation:

## 2022-09-07 VITALS
TEMPERATURE: 97.6 F | OXYGEN SATURATION: 99 % | HEIGHT: 65 IN | HEART RATE: 60 BPM | SYSTOLIC BLOOD PRESSURE: 101 MMHG | BODY MASS INDEX: 32.32 KG/M2 | DIASTOLIC BLOOD PRESSURE: 60 MMHG | RESPIRATION RATE: 16 BRPM

## 2022-09-07 PROCEDURE — 250N000013 HC RX MED GY IP 250 OP 250 PS 637: Performed by: OBSTETRICS & GYNECOLOGY

## 2022-09-07 PROCEDURE — 250N000011 HC RX IP 250 OP 636: Performed by: OBSTETRICS & GYNECOLOGY

## 2022-09-07 RX ADMIN — CETIRIZINE HYDROCHLORIDE 10 MG: 5 TABLET ORAL at 08:36

## 2022-09-07 RX ADMIN — DOCUSATE SODIUM 100 MG: 100 CAPSULE, LIQUID FILLED ORAL at 08:36

## 2022-09-07 RX ADMIN — ENOXAPARIN SODIUM 40 MG: 100 INJECTION SUBCUTANEOUS at 08:40

## 2022-09-07 ASSESSMENT — ACTIVITIES OF DAILY LIVING (ADL)
ADLS_ACUITY_SCORE: 20

## 2022-09-07 NOTE — PROGRESS NOTES
"[facility] OB/GYN Department    Post-Partum Progress Note: PPD #2    Name: Teresa Salinas  Date: 9/7/2022    Subjective:   Patient seen and examined.  No complaints.  Pain well controlled on PO medications.  Tolerating regular diet, without nausea or vomiting.  Ambulating and voiding without difficulty.  Lochia moderate.  Breast feeding.     ROS:    General/Constitutional:  Denies chills or fever  Respiratory: Denies shortness of breath  Cardiovascular: Denies chest pain  Gastrointestinal:  +mild uterine cramping, no nausea or vomiting  Genitourinary: Denies difficulty urinating  Musculoskeletal: Denies peripheral edema      Objective:   No intake or output data in the 24 hours ending 09/07/22 0946    Patient Vitals for the past 24 hrs:   BP Temp Temp src Pulse Resp SpO2 Height   09/07/22 0840 101/60 97.6  F (36.4  C) Oral 60 16 99 % --   09/07/22 0700 -- -- -- -- -- -- 1.651 m (5' 5\")   09/07/22 0012 108/66 98.1  F (36.7  C) Oral 70 16 -- --   09/06/22 1505 106/68 96.8  F (36  C) Oral 77 16 98 % --       Recent Labs   Lab 09/05/22  1045   HGB 12.6       General appearance: well-hydrated, A&O x 3, no apparent distress  ENT: EOMI, sclera anicteric   Lungs: Equal expansion bilaterally, no accessory muscle use  Heart: No heaves or thrills. No peripheral varicosities  Constitutional: See vitals  Abdomen: Soft, non-distended, no rebound or rigidity   Uterus: Firm below umbilicus with non-tender fundus   Neurologic: CN II-XII grossly intact, no lateralizing defects, no gross movement abnormalities  Extremities: no edema, no calf tenderness    Assessment and Plan:    Rh negative state in antepartum period  Rhogam evaluation post partum    Infection due to 2019 novel coronavirus  COVID + 9/3/22, symptom onset 9/2/22  Symptoms improving, did not feel well on the 3rd (cough, fever, nausea, vomiting), but improving to mild cough currently  Lovenox while admitted post partum. After discussion of options, will " plan to defer home anticoagulation. Discussed symptoms of VTE and strict return precautions. Encourage ambulation.     (spontaneous vaginal delivery)  PPD#2 s/p   Routine post partum care  Optimize pain management  Perineal care  Lactation support  Anticipate discharge home today    Ester Watkins DO

## 2022-09-07 NOTE — PLAN OF CARE
Vitals and assessments WDL. Patient independent in room. Has denied pain throughout shift. Breastfeeding well. Planning to discharge tomorrow.

## 2022-09-07 NOTE — PLAN OF CARE
Patient discharged per ambulatory with infant in car seat. Mother verified that her band matches her infant's band by comparing the infant's  #.  Discharge instructions given. Encouraged to call for any problems, questions or concerns.

## 2022-09-07 NOTE — PROGRESS NOTES
Data: Vital signs within normal limits. Postpartum checks within normal limits - see flow record. Patient  is tolerating po intake. Patient is able to empty bladder independently. . Patient ambulating independently..   No apparent signs of infection. Lac 2nd degree healing well. Patient is performing self cares and is able to care for infant. Positive attachment behaviors are observed with infant. Support persons are present.  Action:  Pain plan was discussed. Patient will request pain med when she is ready for it. Patient was not medicated during the shift for pain. See MAR.Patient education done about mask usage, breastfeeding,  cares, postpartum cares, pain management/plan and discharge from hospital. See flow record.  Plan: Anticipate discharge on 22.

## 2022-09-07 NOTE — DISCHARGE INSTRUCTIONS
Postpartum Vaginal Delivery Instructions    Activity     Ask family and friends for help when you need it.  Do not place anything in your vagina for 6 weeks.  You are not restricted on other activities, but take it easy for a few weeks to allow your body to recover from delivery.  You are able to do any activities you feel up to that point.  No driving until you have stopped taking your pain medications (usually two weeks after delivery).     Call your health care provider if you have any of these symptoms:     Increased pain, swelling, redness, or fluid around your stiches from an episiotomy or perineal tear.  A fever above 100.4 F (38 C) with or without chills when placing a thermometer under your tongue.  You soak a sanitary pad with blood within 1 hour, or you see blood clots larger than a golf ball.  Bleeding that lasts more than 6 weeks.  Vaginal discharge that smells bad.  Severe pain, cramping or tenderness in your lower belly area.  A need to urinate more frequently (use the toilet more often), more urgently (use the toilet very quickly), or it burns when you urinate.  Nausea and vomiting.  Redness, swelling or pain around a vein in your leg.  Problems breastfeeding or a red or painful area on your breast.  Chest pain and cough or are gasping for air.  Problems coping with sadness, anxiety, or depression.  If you have any concerns about hurting yourself or the baby, call your provider immediately.   You have questions or concerns after you return home.     Keep your hands clean:  Always wash your hands before touching your perineal area and stitches.  This helps reduce your risk of infection.  If your hands aren't dirty, you may use an alcohol hand-rub to clean your hands. Keep your nails clean and short.        If you you feel sad, have difficulty sleeping, feel overwhelmed, anxious or have troubling thoughts you may call your clinic, or the HelpLine for Pregnancy & Postpartum Support [place]. (PPS HelpLine  636.801.5179) or online resource list  @ www.ppsupport[place].org    For problems or questions with breastfeeding after discharge call: [phone] at the Peds clinic.  After hours you may leave a message and your call will returned the next morning. You may also call the Birthplace to answer questions, [phone].

## 2022-09-07 NOTE — DISCHARGE SUMMARY
[facility] Discharge Summary    Teresa Salinas MRN# [id number]   Age: 37 year old YOB: 1985     Date of Admission:  2022  Date of Discharge::  2022  Admitting Physician:  Nicolas Turner MD  Discharge Physician:  Ester Watkins DO     Home clinic: [facility]          Admission Diagnoses:   Normal labor [O80, Z37.9]  COVID infection       Discharge Diagnosis:   Normal spontaneous vaginal delivery          Procedures:   Procedure(s):        No other procedures performed during this admission           Medications Prior to Admission:     Medications Prior to Admission   Medication Sig Dispense Refill Last Dose     cetirizine (ZYRTEC) 10 MG tablet Take 10 mg by mouth daily  30 tablet 1 2022 at Unknown time     Cholecalciferol (VITAMIN D) 2000 UNITS tablet Take 2,000 Units by mouth daily 100 tablet 3 Past Week at Unknown time     Prenatal Vit-Fe Fumarate-FA (PRENATAL MULTIVITAMIN W/IRON) 27-0.8 MG tablet Take 1 tablet by mouth daily   2022 at Unknown time     RABEprazole (ACIPHEX) 20 MG EC tablet Take 1 tablet (20 mg) by mouth daily 30 tablet 3 2022 at Unknown time             Discharge Medications:     Current Discharge Medication List      CONTINUE these medications which have NOT CHANGED    Details   cetirizine (ZYRTEC) 10 MG tablet Take 10 mg by mouth daily   Qty: 30 tablet, Refills: 1      Cholecalciferol (VITAMIN D) 2000 UNITS tablet Take 2,000 Units by mouth daily  Qty: 100 tablet, Refills: 3      Prenatal Vit-Fe Fumarate-FA (PRENATAL MULTIVITAMIN W/IRON) 27-0.8 MG tablet Take 1 tablet by mouth daily      RABEprazole (ACIPHEX) 20 MG EC tablet Take 1 tablet (20 mg) by mouth daily  Qty: 30 tablet, Refills: 3    Associated Diagnoses: Encounter for supervision of other normal pregnancy in second trimester; Gastroesophageal reflux disease with esophagitis without hemorrhage            Home OTC Ibuprofen and stool sofener        "Consultations:   No consultations were requested during this admission          Brief History of Labor:   Teresa Salinas is a 37 year old  @ 39+5 Weeks EGA  She presented with active labor @ 08:00 on 2022  Pregnancy was complicated by a circumvallate placenta. The interval growth was normal.  She is Rh negative s/p Rhogam in   GBS Positive  Membrane status: AROM @ 14:56 on 2022  Labored spontaneously  Pain meds Epidural   FHR Cat II  Delivered a viable Female  @ 16:35 over intact perineum  Weight # oz   APGAR's 7/8  Placenta delivered @ 16:39 , was complete with a 3vessel cord  Laceration 2nd degree repaired  Repaired with 3/O vicryl    ml  \"Mary\"           Hospital Course:   The patient's hospital course was unremarkable.  COVID symptoms remained mild (cough). On discharge, her pain was well controlled. Vaginal bleeding is similar to peak menstrual flow.  Voiding without difficulty.  Ambulating well and tolerating a normal diet.  No fever.  Breastfeeding well.  Infant is stable.  Discussed option of discharge home with anticoagulation due to COVID infection. This was deferred due to mild symptoms. 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 She was discharged on post-partum day #2.    Post-partum hemoglobin:   Hemoglobin   Date Value Ref Range Status   2022 12.6 11.7 - 15.7 g/dL Final   2019 10.8 (L) 11.7 - 15.7 g/dL Final             Discharge Instructions and Follow-Up:   Discharge diet: Regular   Discharge activity: Pelvic rest: abstain from intercourse and do not use tampons for 6 week(s)   Discharge follow-up: Follow up with OBGYN in 6 weeks   Wound care: Ice to area for comfort           Discharge Disposition:   Discharged to home      Attestation:  I have reviewed today's vital signs, notes, medications, labs and imaging.    Ester Watkins DO"

## 2022-09-08 ENCOUNTER — PATIENT OUTREACH (OUTPATIENT)
Dept: FAMILY MEDICINE | Facility: CLINIC | Age: 37
End: 2022-09-08

## 2022-09-08 LAB
PATH REPORT.COMMENTS IMP SPEC: NORMAL
PATH REPORT.COMMENTS IMP SPEC: NORMAL
PATH REPORT.FINAL DX SPEC: NORMAL
PATH REPORT.GROSS SPEC: NORMAL
PATH REPORT.MICROSCOPIC SPEC OTHER STN: NORMAL
PATH REPORT.RELEVANT HX SPEC: NORMAL
PHOTO IMAGE: NORMAL

## 2022-09-15 NOTE — PROGRESS NOTES
06/02/22 Note: This is a copy of patient's last daily visit and will also serve as their Discharge Summary as they have not been in for further treatment 30 days past this date. Final assessment of goals and physical and functional status , therefore unavailable.    Signing Clinician's Name / Credentials   Signing clinician's name / credentials SHAHID Saleh/L CHT   Session Number   Session Number 2( reporting period 5/11/22 to 6/2/220   Quick Add   Quick Add  Hand   Hand   Referring Physician Dr. Randall   Medical Diagnosis Left Thumb Pain   Orders Evaluate And Treat As Indicated   Subjective Report   Subjective Report Feels that splint has been helpful   Initial Pain level 3/10  (at worst 0 at best)   Current Pain level 0/10   Objective Measures   Objective Measures Objective Measure 1   ROM Comments see Eval   Objective Measure 1   Objective Measure non tender today with palpation to MP joint   Therapeutic Interventions   Therapeutic Interventions Self Care/Home Management;Orthotics;Therapeutic Procedure/Exercise   Self Care/Home Management   Self-Care/Home Mgmt/ADL, Compensatory, Meal Prep Minutes (88875) 8 Minutes   Skilled Intervention ADL safety   Treatment Detail education on progressing to next steps and weaning from splint and use 1 hour a day. 2 hours, 3 hours ect   Therapeutic Procedure/Exercise   Therapeutic Procedure: strength, endurance, ROM, flexibility minutes (67409) 8   Skilled Intervention to teach new HEP   Patient Response demonstrates understanding   Treatment Detail [email or web address],Thumb Stabilization Strengthening Isometric C,Thumb Stabilization Place and Hold Pinch,Thumb Strengthening for DeQuervains Phase 3,Thumb Strengthening for DeQuervains Phase 2,Thumb Strengthening for DeQuervains Phase 1 ( these ex for isometrics and also isometric extension   Assessments Completed   Assessments Completed patient doing well- splint has been helping   Education    Learner Patient   Readiness Eager   Method Explanation;Demonstration   Response Verbalizes understanding;Demonstrates understanding   Plan   Home program full time splinting- OK for gentle pain free stretching   Plan for next session If no added pain in the next 3 weeks , no need to return but if questions or concerns, chart will be help open for 6 weeks.   Total Session Time

## 2022-09-17 ENCOUNTER — OFFICE VISIT (OUTPATIENT)
Dept: URGENT CARE | Facility: URGENT CARE | Age: 37
End: 2022-09-17
Payer: COMMERCIAL

## 2022-09-17 VITALS
TEMPERATURE: 98.2 F | DIASTOLIC BLOOD PRESSURE: 67 MMHG | HEART RATE: 80 BPM | SYSTOLIC BLOOD PRESSURE: 107 MMHG | OXYGEN SATURATION: 98 %

## 2022-09-17 DIAGNOSIS — J01.90 ACUTE SINUSITIS WITH SYMPTOMS > 10 DAYS: Primary | ICD-10-CM

## 2022-09-17 DIAGNOSIS — R05.9 COUGH: ICD-10-CM

## 2022-09-17 PROCEDURE — 99213 OFFICE O/P EST LOW 20 MIN: CPT | Performed by: FAMILY MEDICINE

## 2022-09-17 NOTE — PROGRESS NOTES
SUBJECTIVE   Teresa Salinas is a 37 year old female who presents with     Concern -   Onset: 2 weeks  Description: diagnosed with Covid 19 infection 2 weeks ago, sinus congestion and greenish drainage for last 10 days or so  Intensity: moderate  Progression of Symptoms:  worsening  Accompanying Signs & Symptoms: productive cough, no fever, chills, sob or other relevant systemic symptoms   Previous history of similar problem: sinus infection   Therapies tried and outcome: OTC antitussive       PCP   Kacie Vazquez, [phone]    Health Maintenance        Health Maintenance Due   Topic Date Due     ADVANCE CARE PLANNING  Never done     HEPATITIS B IMMUNIZATION (3 of 3 - 3-dose primary series) 1998     ASTHMA ACTION PLAN  2019     PREVENTIVE CARE VISIT  10/31/2020     INFLUENZA VACCINE (1) 2022       HPI        Patient Active Problem List   Diagnosis     Allergic rhinitis due to dust mite     Mild intermittent asthma     Family history of thyroid disease     Allergic rhinitis due to pollen     Rh negative state in antepartum period     GBS bacteriuria     GBS (group B Streptococcus carrier), +RV culture, currently pregnant     Infection due to 2019 novel coronavirus      (spontaneous vaginal delivery)     Current Outpatient Medications   Medication     cetirizine (ZYRTEC) 10 MG tablet     Cholecalciferol (VITAMIN D) 2000 UNITS tablet     Prenatal Vit-Fe Fumarate-FA (PRENATAL MULTIVITAMIN W/IRON) 27-0.8 MG tablet     RABEprazole (ACIPHEX) 20 MG EC tablet     No current facility-administered medications for this visit.       Patient Active Problem List   Diagnosis     Allergic rhinitis due to dust mite     Mild intermittent asthma     Family history of thyroid disease     Allergic rhinitis due to pollen     Rh negative state in antepartum period     GBS bacteriuria     GBS (group B Streptococcus carrier), +RV culture, currently pregnant     Infection due to 2019 novel coronavirus       (spontaneous vaginal delivery)     Past Surgical History:   Procedure Laterality Date     Arthoscopic right ankle Right      OSTEOTOMY ANKLE Right        Social History     Tobacco Use     Smoking status: Never Smoker     Smokeless tobacco: Never Used   Substance Use Topics     Alcohol use: No     Alcohol/week: 0.0 standard drinks     Family History   Problem Relation Age of Onset     Hyperlipidemia Mother      Thyroid Disease Mother      Seasonal/Environmental Allergies Mother      Diabetes Father         typeII     Hypertension Father      Hyperlipidemia Father      Depression Father      Seasonal/Environmental Allergies Father      Asthma Sister      Seasonal/Environmental Allergies Sister      Other - See Comments Sister         May-Thurner syndrome     Thyroid Disease Sister      Hyperlipidemia Maternal Grandmother      Colon Cancer Maternal Grandfather      Chronic Obstructive Pulmonary Disease Paternal Grandmother      Esophageal Cancer Paternal Grandfather          Current Outpatient Medications   Medication Sig Dispense Refill     cetirizine (ZYRTEC) 10 MG tablet Take 10 mg by mouth daily  30 tablet 1     Cholecalciferol (VITAMIN D) 2000 UNITS tablet Take 2,000 Units by mouth daily 100 tablet 3     Prenatal Vit-Fe Fumarate-FA (PRENATAL MULTIVITAMIN W/IRON) 27-0.8 MG tablet Take 1 tablet by mouth daily       RABEprazole (ACIPHEX) 20 MG EC tablet Take 1 tablet (20 mg) by mouth daily 30 tablet 3     Allergies   Allergen Reactions     Contrast Dye Shortness Of Breath     Clindamycin Hives     Recent Labs   Lab Test 22  1851 06/12/15  1150   LDL  --  68   HDL  --  51   TRIG  --  46   CR 0.59  --    GFRESTIMATED >90  --    TSH  --  1.38      BP Readings from Last 3 Encounters:   22 107/67   22 101/60   22 118/71    Wt Readings from Last 3 Encounters:   22 88.1 kg (194 lb 3.2 oz)   22 85.4 kg (188 lb 4.8 oz)   22 84.9 kg (187 lb 1.6 oz)               Reviewed  and updated:  Tobacco  Allergies  Meds  Med Hx  Surg Hx  Fam Hx  Soc Hx     ROS:  Constitutional, HEENT, cardiovascular, pulmonary, gi and gu systems are negative, except as otherwise noted.    PHYSICAL EXAM   /67   Pulse 80   Temp 98.2  F (36.8  C) (Tympanic)   LMP 11/13/2021 (Approximate)   SpO2 98%   There is no height or weight on file to calculate BMI.  GENERAL: alert and no distress  EYES: Eyes grossly normal to inspection, PERRL and conjunctivae and sclerae normal  HENT: normal cephalic/atraumatic, ear canals and TM's normal, nasal mucosa edematous , oral mucous membranes moist and sinuses: maxillary, frontal tenderness on both sides  NECK: no adenopathy, no asymmetry, masses, or scars and thyroid normal to palpation  RESP: lungs clear to auscultation - no rales, rhonchi or wheezes  CV: regular rates and rhythm, normal S1 S2, no S3 or S4 and no murmur, click or rub  MS: no gross musculoskeletal defects noted, no edema  NEURO: Normal strength and tone, mentation intact and speech normal  PSYCH: mentation appears normal, affect normal/bright      Assessment & Plan     Acute sinusitis with symptoms > 10 days  Differential discussed in detail.  Symptoms likely secondary to COVID-19 infection with superimposed acute sinusitis.  Chest x-ray findings reviewed independently which came back unremarkable.  Augmentin prescribed, common side effects discussed.  Recommended well hydration, warm fluids, over-the-counter analgesia and antitussive.  Return criteria explained in detail.  Patient understood and in agreement with the above plan.  All questions answered.  - amoxicillin-clavulanate (AUGMENTIN) 875-125 MG tablet; Take 1 tablet by mouth 2 times daily for 10 days    Cough  - XR Chest 2 Views; Future       Chaz Busby MD  [facility] U[facility]

## 2022-09-18 ENCOUNTER — HOSPITAL ENCOUNTER (EMERGENCY)
Facility: CLINIC | Age: 37
Discharge: HOME | End: 2022-09-19
Attending: EMERGENCY MEDICINE | Admitting: EMERGENCY MEDICINE
Payer: COMMERCIAL

## 2022-09-18 ENCOUNTER — APPOINTMENT (OUTPATIENT)
Dept: CT IMAGING | Facility: CLINIC | Age: 37
End: 2022-09-18
Attending: EMERGENCY MEDICINE
Payer: COMMERCIAL

## 2022-09-18 DIAGNOSIS — R06.02 SOB (SHORTNESS OF BREATH): ICD-10-CM

## 2022-09-18 DIAGNOSIS — R07.9 CHEST PAIN, UNSPECIFIED TYPE: ICD-10-CM

## 2022-09-18 LAB
ANION GAP SERPL CALCULATED.3IONS-SCNC: 9 MMOL/L (ref 7–15)
BASOPHILS # BLD AUTO: 0.1 10E3/UL (ref 0–0.2)
BASOPHILS NFR BLD AUTO: 1 %
BUN SERPL-MCNC: 9.6 MG/DL (ref 6–20)
CALCIUM SERPL-MCNC: 9 MG/DL (ref 8.6–10)
CHLORIDE SERPL-SCNC: 107 MMOL/L (ref 98–107)
CREAT SERPL-MCNC: 0.68 MG/DL (ref 0.51–0.95)
D DIMER PPP FEU-MCNC: 1.81 UG/ML FEU (ref 0–0.5)
DEPRECATED HCO3 PLAS-SCNC: 27 MMOL/L (ref 22–29)
EOSINOPHIL # BLD AUTO: 0.2 10E3/UL (ref 0–0.7)
EOSINOPHIL NFR BLD AUTO: 3 %
ERYTHROCYTE [DISTWIDTH] IN BLOOD BY AUTOMATED COUNT: 13 % (ref 10–15)
GFR SERPL CREATININE-BSD FRML MDRD: >90 ML/MIN/1.73M2
GLUCOSE SERPL-MCNC: 111 MG/DL (ref 70–99)
HCT VFR BLD AUTO: 40.1 % (ref 35–47)
HGB BLD-MCNC: 13 G/DL (ref 11.7–15.7)
IMM GRANULOCYTES # BLD: 0 10E3/UL
IMM GRANULOCYTES NFR BLD: 1 %
LYMPHOCYTES # BLD AUTO: 2.5 10E3/UL (ref 0.8–5.3)
LYMPHOCYTES NFR BLD AUTO: 38 %
MCH RBC QN AUTO: 27 PG (ref 26.5–33)
MCHC RBC AUTO-ENTMCNC: 32.4 G/DL (ref 31.5–36.5)
MCV RBC AUTO: 83 FL (ref 78–100)
MONOCYTES # BLD AUTO: 0.5 10E3/UL (ref 0–1.3)
MONOCYTES NFR BLD AUTO: 8 %
NEUTROPHILS # BLD AUTO: 3.3 10E3/UL (ref 1.6–8.3)
NEUTROPHILS NFR BLD AUTO: 49 %
NRBC # BLD AUTO: 0 10E3/UL
NRBC BLD AUTO-RTO: 0 /100
PLATELET # BLD AUTO: 264 10E3/UL (ref 150–450)
POTASSIUM SERPL-SCNC: 4.3 MMOL/L (ref 3.4–5.3)
RBC # BLD AUTO: 4.81 10E6/UL (ref 3.8–5.2)
SODIUM SERPL-SCNC: 143 MMOL/L (ref 136–145)
WBC # BLD AUTO: 6.5 10E3/UL (ref 4–11)

## 2022-09-18 PROCEDURE — 96374 THER/PROPH/DIAG INJ IV PUSH: CPT | Mod: 59 | Performed by: EMERGENCY MEDICINE

## 2022-09-18 PROCEDURE — 250N000011 HC RX IP 250 OP 636: Performed by: EMERGENCY MEDICINE

## 2022-09-18 PROCEDURE — 71275 CT ANGIOGRAPHY CHEST: CPT

## 2022-09-18 PROCEDURE — 85025 COMPLETE CBC W/AUTO DIFF WBC: CPT | Performed by: EMERGENCY MEDICINE

## 2022-09-18 PROCEDURE — 80053 COMPREHEN METABOLIC PANEL: CPT | Performed by: EMERGENCY MEDICINE

## 2022-09-18 PROCEDURE — 250N000009 HC RX 250: Performed by: EMERGENCY MEDICINE

## 2022-09-18 PROCEDURE — 99285 EMERGENCY DEPT VISIT HI MDM: CPT | Performed by: EMERGENCY MEDICINE

## 2022-09-18 PROCEDURE — 85379 FIBRIN DEGRADATION QUANT: CPT | Performed by: EMERGENCY MEDICINE

## 2022-09-18 PROCEDURE — 82248 BILIRUBIN DIRECT: CPT | Performed by: EMERGENCY MEDICINE

## 2022-09-18 PROCEDURE — 36415 COLL VENOUS BLD VENIPUNCTURE: CPT | Performed by: EMERGENCY MEDICINE

## 2022-09-18 PROCEDURE — 93010 ELECTROCARDIOGRAM REPORT: CPT | Performed by: EMERGENCY MEDICINE

## 2022-09-18 PROCEDURE — 99285 EMERGENCY DEPT VISIT HI MDM: CPT | Mod: 25 | Performed by: EMERGENCY MEDICINE

## 2022-09-18 PROCEDURE — 93005 ELECTROCARDIOGRAM TRACING: CPT | Performed by: EMERGENCY MEDICINE

## 2022-09-18 RX ORDER — DIPHENHYDRAMINE HYDROCHLORIDE 50 MG/ML
50 INJECTION INTRAMUSCULAR; INTRAVENOUS ONCE
Status: COMPLETED | OUTPATIENT
Start: 2022-09-18 | End: 2022-09-18

## 2022-09-18 RX ORDER — IOPAMIDOL 755 MG/ML
70 INJECTION, SOLUTION INTRAVASCULAR ONCE
Status: COMPLETED | OUTPATIENT
Start: 2022-09-18 | End: 2022-09-18

## 2022-09-18 RX ADMIN — SODIUM CHLORIDE 100 ML: 9 INJECTION, SOLUTION INTRAVENOUS at 23:55

## 2022-09-18 RX ADMIN — DIPHENHYDRAMINE HYDROCHLORIDE 50 MG: 50 INJECTION, SOLUTION INTRAMUSCULAR; INTRAVENOUS at 23:42

## 2022-09-18 RX ADMIN — IOPAMIDOL 70 ML: 755 INJECTION, SOLUTION INTRAVENOUS at 23:55

## 2022-09-18 ASSESSMENT — ACTIVITIES OF DAILY LIVING (ADL): ADLS_ACUITY_SCORE: 35

## 2022-09-19 VITALS
OXYGEN SATURATION: 97 % | RESPIRATION RATE: 11 BRPM | DIASTOLIC BLOOD PRESSURE: 77 MMHG | HEIGHT: 65 IN | SYSTOLIC BLOOD PRESSURE: 119 MMHG | TEMPERATURE: 98.6 F | BODY MASS INDEX: 28.32 KG/M2 | HEART RATE: 67 BPM | WEIGHT: 170 LBS

## 2022-09-19 LAB
ALBUMIN SERPL BCG-MCNC: 3.7 G/DL (ref 3.5–5.2)
ALP SERPL-CCNC: 102 U/L (ref 35–104)
ALT SERPL W P-5'-P-CCNC: 36 U/L (ref 10–35)
AST SERPL W P-5'-P-CCNC: 22 U/L (ref 10–35)
BILIRUB DIRECT SERPL-MCNC: <0.2 MG/DL (ref 0–0.3)
BILIRUB SERPL-MCNC: 0.2 MG/DL
HOLD SPECIMEN: NORMAL
PROT SERPL-MCNC: 6.5 G/DL (ref 6.4–8.3)

## 2022-09-19 ASSESSMENT — ENCOUNTER SYMPTOMS
COUGH: 1
FEVER: 0
ABDOMINAL PAIN: 0
SINUS PRESSURE: 1
SHORTNESS OF BREATH: 1

## 2022-09-19 NOTE — ED TRIAGE NOTES
Left rib and side pain that hurts when she talks or takes deep breath. Calf pain a few days ago but it has gone away. 2 weeks post partum and worried about blood clot

## 2022-09-19 NOTE — ED PROVIDER NOTES
"  History     Chief Complaint   Patient presents with     Chest Wall Pain     HPI  Teresa Salinas is a 37 year old female who presents to the emergency department with concerns regarding left-sided chest pain, in addition to shortness of breath.  Patient does have family history of blood clots, and therefore patient is concerned regarding potential for blood clot.  Patient had normal spontaneous vaginal delivery of healthy baby girl 2 weeks ago.  Patient was COVID-positive with mild symptoms at time of delivery, and did have Lovenox injections for 2 days during the peripartum timeframe.  She was not discharged home on any anticoagulants.  She reports a few days of worsening shortness of breath, in addition to left-sided chest pain.  This worsened during the day today, prompting ED visit.  Patient did have a urgent care visit 2 days ago, and had Augmentin which was prescribed primarily for sinusitis type symptoms.  Has had harsh cough recently, and this has also resulted in increased amounts of left-sided chest pain.  No lower extremity swelling.  Patient is breast-feeding.  No fever.      Allergies:  Allergies   Allergen Reactions     Contrast Dye Shortness Of Breath and Cough     States \"sneezing\" was the reaction.    Tolerated CT PE study during ED visit on 22.  Received benadryl prior to CT.  No side effects noted to contrast dye.     Clindamycin Hives       Problem List:    Patient Active Problem List    Diagnosis Date Noted      (spontaneous vaginal delivery) 2022     Priority: Medium     GBS (group B Streptococcus carrier), +RV culture, currently pregnant 2022     Priority: Medium     Infection due to 2019 novel coronavirus 2022     Priority: Medium     GBS bacteriuria 2019     Priority: Medium     Rh negative state in antepartum period 2019     Priority: Medium     Allergic rhinitis due to pollen 2018     Priority: Medium     Percutaneous skin puncture testing " for aeroallergens performed on November 26, 2018 showed sensitivity to dog and pollen of grass mix #7.       Mild intermittent asthma 06/12/2015     Priority: Medium     Family history of thyroid disease 06/12/2015     Priority: Medium     Allergic rhinitis due to dust mite 03/11/2014     Priority: Medium        Past Medical History:    Past Medical History:   Diagnosis Date     Allergic rhinitis 3/11/2014     Asthma 3/11/2014     Chickenpox      Depression      Family history of thyroid disease 6/12/2015       Past Surgical History:    Past Surgical History:   Procedure Laterality Date     Arthoscopic right ankle Right 1999     OSTEOTOMY ANKLE Right 2003       Family History:    Family History   Problem Relation Age of Onset     Hyperlipidemia Mother      Thyroid Disease Mother      Seasonal/Environmental Allergies Mother      Diabetes Father         typeII     Hypertension Father      Hyperlipidemia Father      Depression Father      Seasonal/Environmental Allergies Father      Asthma Sister      Seasonal/Environmental Allergies Sister      Other - See Comments Sister         May-Thurner syndrome     Thyroid Disease Sister      Hyperlipidemia Maternal Grandmother      Colon Cancer Maternal Grandfather      Chronic Obstructive Pulmonary Disease Paternal Grandmother      Esophageal Cancer Paternal Grandfather        Social History:  Marital Status:   [2]  Social History     Tobacco Use     Smoking status: Never Smoker     Smokeless tobacco: Never Used   Vaping Use     Vaping Use: Never used   Substance Use Topics     Alcohol use: No     Alcohol/week: 0.0 standard drinks     Drug use: No        Medications:    amoxicillin-clavulanate (AUGMENTIN) 875-125 MG tablet  cetirizine (ZYRTEC) 10 MG tablet  Cholecalciferol (VITAMIN D) 2000 UNITS tablet  Prenatal Vit-Fe Fumarate-FA (PRENATAL MULTIVITAMIN W/IRON) 27-0.8 MG tablet  RABEprazole (ACIPHEX) 20 MG EC tablet          Review of Systems   Constitutional: Negative  "for fever.   HENT: Positive for sinus pressure.    Respiratory: Positive for cough and shortness of breath.    Cardiovascular: Positive for chest pain.   Gastrointestinal: Negative for abdominal pain.   All other systems reviewed and are negative.      Physical Exam   BP: (!) 147/88  Pulse: 67  Temp: 98.6  F (37  C)  Resp: 16  Height: 165.1 cm (5' 5\")  Weight: 77.1 kg (170 lb)  SpO2: 97 %      Physical Exam  /77   Pulse 67   Temp 98.6  F (37  C) (Tympanic)   Resp 11   Ht 1.651 m (5' 5\")   Wt 77.1 kg (170 lb)   LMP 11/13/2021 (Approximate)   SpO2 97%   BMI 28.29 kg/m    General: alert, interactive, in no apparent distress  Head: atraumatic  Nose: no rhinorrhea or epistaxis  Ears: no external auditory canal discharge or bleeding.    Eyes: Sclera nonicteric. Conjunctiva noninjected.    Mouth: no tonsillar erythema, edema, or exudate  Neck: supple, no palp LAD  Lungs: CTAB  CV: RRR, S1/S2; peripheral pulses palpable and symmetric  Abdomen: soft, nt, nd, no guarding or rebound. Positive bowel sounds  Extremities: no cyanosis or edema  Skin: no rash or diaphoresis  Neuro:   strength 5/5 in UE and LEs bilaterally, sensation intact to light touch in UE and LEs bilaterally;       ED Course                 Procedures         EKG, reviewed by myself shows normal sinus rhythm.  Rate 60 bpm.  Normal intervals.  No ectopy.  No acute ischemic appearing changes.    Critical Care time:  none               Results for orders placed or performed during the hospital encounter of 09/18/22 (from the past 24 hour(s))   CBC with Platelets & Differential    Narrative    The following orders were created for panel order CBC with Platelets & Differential.  Procedure                               Abnormality         Status                     ---------                               -----------         ------                     CBC with platelets and d...[[id number]]                      Final result                 Please view " results for these tests on the individual orders.   Basic metabolic panel   Result Value Ref Range    Sodium 143 136 - 145 mmol/L    Potassium 4.3 3.4 - 5.3 mmol/L    Chloride 107 98 - 107 mmol/L    Carbon Dioxide (CO2) 27 22 - 29 mmol/L    Anion Gap 9 7 - 15 mmol/L    Urea Nitrogen 9.6 6.0 - 20.0 mg/dL    Creatinine 0.68 0.51 - 0.95 mg/dL    Calcium 9.0 8.6 - 10.0 mg/dL    Glucose 111 (H) 70 - 99 mg/dL    GFR Estimate >90 >60 mL/min/1.73m2   [place] Draw    Narrative    The following orders were created for panel order [place] Draw.  Procedure                               Abnormality         Status                     ---------                               -----------         ------                     Extra Blue Top Tube[[id number]]                              Final result                 Please view results for these tests on the individual orders.   CBC with platelets and differential   Result Value Ref Range    WBC Count 6.5 4.0 - 11.0 10e3/uL    RBC Count 4.81 3.80 - 5.20 10e6/uL    Hemoglobin 13.0 11.7 - 15.7 g/dL    Hematocrit 40.1 35.0 - 47.0 %    MCV 83 78 - 100 fL    MCH 27.0 26.5 - 33.0 pg    MCHC 32.4 31.5 - 36.5 g/dL    RDW 13.0 10.0 - 15.0 %    Platelet Count 264 150 - 450 10e3/uL    % Neutrophils 49 %    % Lymphocytes 38 %    % Monocytes 8 %    % Eosinophils 3 %    % Basophils 1 %    % Immature Granulocytes 1 %    NRBCs per 100 WBC 0 <1 /100    Absolute Neutrophils 3.3 1.6 - 8.3 10e3/uL    Absolute Lymphocytes 2.5 0.8 - 5.3 10e3/uL    Absolute Monocytes 0.5 0.0 - 1.3 10e3/uL    Absolute Eosinophils 0.2 0.0 - 0.7 10e3/uL    Absolute Basophils 0.1 0.0 - 0.2 10e3/uL    Absolute Immature Granulocytes 0.0 <=0.4 10e3/uL    Absolute NRBCs 0.0 10e3/uL   Extra Blue Top Tube   Result Value Ref Range    Hold Specimen JI    D dimer quantitative   Result Value Ref Range    D-Dimer Quantitative 1.81 (H) 0.00 - 0.50 ug/mL FEU    Narrative    This D-dimer assay is intended for use in conjunction with a clinical  pretest probability assessment model to exclude pulmonary embolism (PE) and deep venous thrombosis (DVT) in outpatients suspected of PE or DVT. The cut-off value is 0.50 ug/mL FEU.   Hepatic panel   Result Value Ref Range    Protein Total 6.5 6.4 - 8.3 g/dL    Albumin 3.7 3.5 - 5.2 g/dL    Bilirubin Total 0.2 <=1.2 mg/dL    Alkaline Phosphatase 102 35 - 104 U/L    AST 22 10 - 35 U/L    ALT 36 (H) 10 - 35 U/L    Bilirubin Direct <0.20 0.00 - 0.30 mg/dL   CT Chest Pulmonary Embolism w Contrast    Narrative    EXAM: CT CHEST PULMONARY EMBOLISM W CONTRAST  LOCATION: [facility]  DATE/TIME: 9/19/2022 12:17 AM    INDICATION: elevated d dimer.  recent pregnancy.  family h o blood clots.  left chest pain and SOB  COMPARISON: Chest radiographs from 09/17/2022.  TECHNIQUE: CT chest pulmonary angiogram during arterial phase injection of IV contrast. Multiplanar reformats and MIP reconstructions were performed. Dose reduction techniques were used.   CONTRAST: 70mL isovue 370    FINDINGS:  ANGIOGRAM CHEST: Pulmonary arteries are normal caliber and negative for pulmonary emboli. Thoracic aorta is negative for dissection. No CT evidence of right heart strain.    LUNGS AND PLEURA: No focal airspace infiltrate. No pneumothorax or pleural effusion.    MEDIASTINUM/AXILLAE: Heart size is normal. No pericardial effusion.    CORONARY ARTERY CALCIFICATION: None.    UPPER ABDOMEN: No acute findings.    MUSCULOSKELETAL: Normal.      Impression    IMPRESSION:  1.  Negative for pulmonary embolism.    2.  Clear lungs.       Medications   diphenhydrAMINE (BENADRYL) injection 50 mg (50 mg Intravenous Given 9/18/22 2342)   iopamidol (ISOVUE-370) solution 70 mL (70 mLs Intravenous Given 9/18/22 4545)   sodium chloride 0.9 % bag 500mL for CT scan flush use (100 mLs Intravenous Given 9/18/22 2355)       Assessments & Plan (with Medical Decision Making)  37 year old female, 2 weeks postpartum from a spontaneous vaginal  delivery, presenting the emergency department with concerns regarding left-sided chest pain, in addition to shortness of breath.  Patient well-appearing, nontoxic, with normal, slightly hypertensive vitals upon arrival.  Recheck of blood pressure shows normal values.  No concern for preeclampsia.  D-dimer did return elevated, and therefore CT PE study is ordered.  Patient had reported contrast dye allergy.  She stated that this was secondary to sneezing.  Therefore, benefit outweighs risk, and patient was premedicated with 50 mg IV Benadryl, and tolerated contrast imaging study well.  Lungs are clear.  No evidence for PE.  She does have some reproducible tenderness to palpation on physical exam, and therefore I feel that this does represent chest wall pains.  Patient encouraged on over-the-counter medications, and follow-up in clinic as needed.  I discussed pumping and dumping secondary to sedating type effects of Benadryl.     I have reviewed the nursing notes.    I have reviewed the findings, diagnosis, plan and need for follow up with the patient.       Discharge Medication List as of 9/19/2022 12:31 AM          Final diagnoses:   Chest pain, unspecified type   SOB (shortness of breath)       9/18/2022   [facility] EMERGENCY DEPT     ReynoldRobert contreras MD  09/19/22 0109

## 2022-10-05 ENCOUNTER — MEDICAL CORRESPONDENCE (OUTPATIENT)
Dept: HEALTH INFORMATION MANAGEMENT | Facility: CLINIC | Age: 37
End: 2022-10-05

## 2022-10-09 NOTE — PROGRESS NOTES
Chief Complaint   Patient presents with     Sinus Problem     Patient is here to discuss sinus surgery. Patient states she has many sinus infections and often congested as well. Patient states this has been going on for years.      History of Present Illness  Teresa Salinas is a 37 year old female who presents for follow up. The patient describes symptoms of chronic nasal congestion, rhinorrhea, postnasal drainage for the past several years.  The patient has intermittent symptoms of acute sinusitis where she has increasing congestion, nasal drainage, facial pressure that eventually will respond to antibiotic treatment.  Over course of her pregnancy, she has had approximately 4 or 5 episodes where she is had severe symptoms and required antibiotics.  During the onset of a previous sinus infection episode, she underwent CT imaging.       The patient underwent a sinus CT with acute symptoms on 11/12/2021.  My review of the CT scan shows significant paranasal sinus mucosal thickening in the bilateral maxillary sinuses worse on the right.  There is near complete opacification of the right maxillary sinus.  There is significant thickening of the sinonasal mucosa in the ethmoid systems right greater than left.  The patient has an aplastic right frontal sinus.  The left frontal sinus is well aerated.  The sphenoid sinuses appear normal.  The patient does have a left nasal septal deviation and moderately severe inferior turbinate hypertrophy bilaterally.    She was scheduled for surgery but she became pregnant.  She is interested in pursuing surgery now that she has delivered her baby.     From a symptom standpoint, the patient reports a long history of nose and sinus problems as well as chronic allergy issues.  She has had multiple positives on allergy testing in the past and previously underwent allergy immunotherapy.  She had to stop her immunotherapy when she became pregnant and has not returned to using her allergy  "injection immunotherapy since 2019.  Treatment for her sinus issues have included oral and topical antihistamines, decongestants, topical nasal irrigations, topical nasal steroids.  From a symptom standpoint, the patient reports bilateral nasal obstruction, nasal congestion, rhinorrhea, postnasal drainage.  No prior history of sinus surgery.  No previous nasal trauma.     Past Medical History  Patient Active Problem List   Diagnosis     Allergic rhinitis due to dust mite     Mild intermittent asthma     Family history of thyroid disease     Allergic rhinitis due to pollen     Rh negative state in antepartum period     GBS bacteriuria     GBS (group B Streptococcus carrier), +RV culture, currently pregnant     Infection due to 2019 novel coronavirus      (spontaneous vaginal delivery)     Current Medications    Current Outpatient Medications:      cetirizine (ZYRTEC) 10 MG tablet, Take 10 mg by mouth daily , Disp: 30 tablet, Rfl: 1     Cholecalciferol (VITAMIN D) 2000 UNITS tablet, Take 2,000 Units by mouth daily, Disp: 100 tablet, Rfl: 3     Prenatal Vit-Fe Fumarate-FA (PRENATAL MULTIVITAMIN W/IRON) 27-0.8 MG tablet, Take 1 tablet by mouth daily, Disp: , Rfl:      RABEprazole (ACIPHEX) 20 MG EC tablet, Take 1 tablet (20 mg) by mouth daily (Patient not taking: Reported on 10/12/2022), Disp: 30 tablet, Rfl: 3    Allergies  Allergies   Allergen Reactions     Contrast Dye Shortness Of Breath and Cough     States \"sneezing\" was the reaction.    Tolerated CT PE study during ED visit on 22.  Received benadryl prior to CT.  No side effects noted to contrast dye.     Clindamycin Hives       Social History  Social History     Socioeconomic History     Marital status:      Spouse name: Braxton     Number of children: 1     Years of education: 16   Occupational History     Occupation: Registered Nurse     Employer: [employer]     Comment: 5.5 years   Tobacco Use     Smoking status: Never     " Smokeless tobacco: Never   Vaping Use     Vaping Use: Never used   Substance and Sexual Activity     Alcohol use: No     Alcohol/week: 0.0 standard drinks     Drug use: No     Sexual activity: Yes     Partners: Male   Other Topics Concern     Parent/sibling w/ CABG, MI or angioplasty before 65F 55M? No   Social History Narrative    November 26, 2018            ENVIRONMENTAL HISTORY: [place] The home is heated with a forced air and gas furnace. They do have central air conditioning. The patient's bedroom is furnished with feather/wool bedding or pillows and carpeting in bedroom.  Pets inside the house include 1 dog(s). There is no history of cockroach or mice infestation. There is/are 0 smokers in the house.  The house does not have a damp basement.        Family History  Family History   Problem Relation Age of Onset     Hyperlipidemia Mother      Thyroid Disease Mother      Seasonal/Environmental Allergies Mother      Diabetes Father         typeII     Hypertension Father      Hyperlipidemia Father      Depression Father      Seasonal/Environmental Allergies Father      Asthma Sister      Seasonal/Environmental Allergies Sister      Other - See Comments Sister         May-Thurner syndrome     Thyroid Disease Sister      Hyperlipidemia Maternal Grandmother      Colon Cancer Maternal Grandfather      Chronic Obstructive Pulmonary Disease Paternal Grandmother      Esophageal Cancer Paternal Grandfather        Review of Systems  As per HPI and PMHx, otherwise 10 system review including the head and neck, constitutional, eyes, respiratory, GI, skin, neurologic, lymphatic, endocrine, and allergy systems is negative.    Physical Exam  /66 (BP Location: Right arm, Patient Position: Chair, Cuff Size: Adult Regular)   Pulse 74   Temp 96.8  F (36  C)   LMP 11/13/2021 (Approximate)   SpO2 98%   GENERAL: Patient is a pleasant, cooperative 37 year old  female in no acute distress.  HEAD: Normocephalic, atraumatic.  Hair and scalp are normal.  EYES: Pupils are equal, round, reactive to light and accommodation.  Extraocular movements are intact.  The sclera nonicteric without injection.  The extraocular structures are normal.  EARS: Normal shape and symmetry.  No tenderness when palpating the mastoid or tragal areas bilaterally.     NOSE: Nares are patent.  Nasal mucosa is boggy and inflamed with sticky, inflammatory mucus.  The patient has a leftward nasal septal deviation and severe/marked inferior turbinate hypertrophy left greater than right.  No nasal cavity masses, polyps, or mucopurulence on anterior rhinoscopy.  NEUROLOGIC: Cranial nerves II through XII are grossly intact.  Voice is strong.  Patient is House-Brackmann I/VI bilaterally.  CARDIOVASCULAR: Extremities are warm and well-perfused.  No significant peripheral edema.  RESPIRATORY: Patient has nonlabored breathing without cough, wheeze, stridor.  PSYCHIATRIC: Patient is alert and oriented.  Mood and affect appear normal.  SKIN: Warm and dry.  No scalp, face, or neck lesions noted.    Assessment and Plan     ICD-10-CM    1. Chronic maxillary sinusitis  J32.0 Case Request: FUNCTIONAL ENDOSCOPIC SINUS SURGERY (FESS), SEPTOPLASTY, NOSE, WITH TURBINOPLASTY      2. Chronic anterior ethmoidal sinusitis  J32.2 Case Request: FUNCTIONAL ENDOSCOPIC SINUS SURGERY (FESS), SEPTOPLASTY, NOSE, WITH TURBINOPLASTY      3. History of acute sinusitis  Z87.09 Case Request: FUNCTIONAL ENDOSCOPIC SINUS SURGERY (FESS), SEPTOPLASTY, NOSE, WITH TURBINOPLASTY      4. Nasal obstruction  J34.89 Case Request: FUNCTIONAL ENDOSCOPIC SINUS SURGERY (FESS), SEPTOPLASTY, NOSE, WITH TURBINOPLASTY      5. Deviated nasal septum  J34.2 Case Request: FUNCTIONAL ENDOSCOPIC SINUS SURGERY (FESS), SEPTOPLASTY, NOSE, WITH TURBINOPLASTY      6. Hypertrophy of both inferior nasal turbinates  J34.3 Case Request: FUNCTIONAL ENDOSCOPIC SINUS SURGERY  (FESS), SEPTOPLASTY, NOSE, WITH TURBINOPLASTY      7. History of allergic rhinitis  Z87.09 Case Request: FUNCTIONAL ENDOSCOPIC SINUS SURGERY (FESS), SEPTOPLASTY, NOSE, WITH TURBINOPLASTY         It was my pleasure seeing Teresa Salinas today in clinic.  The patient presents with chronic sinusitis symptoms in the setting of multiple episodes of recurrent acute sinusitis.  She is having at least 4 more episodes per year requiring treatment.  Continue to have problems despite medical management including topical nasal saline irrigations, topical nasal antihistamines, and topical nasal steroids.  She is also had allergy injection immunotherapy in the past but continues to have symptoms.  Based on her CT imaging and previous endoscopic findings, she would be a good candidate for endoscopic sinus surgery.      We discussed the risks, benefits, alternatives, options of endonasal septoplasty, bilateral inferior turbinate reduction, bilateral endoscopic maxillary antrostomies, bilateral endoscopic anterior ethmoidectomies including, but not limited to: Risk of bleeding, risk of infection, risk of postoperative pain, risk of septal hematoma, risk of septal perforation, risk of CSF leak, risk of injury to the orbital contents, risk of intranasal scarring or adhesions, risk of smell disturbance or smell loss, potential need for additional procedures, risk of general anesthesia.  The patient voiced understanding and is willing to proceed.  We discussed the postoperative course and convalescence including lifting and activity restrictions, placement of nasal splints, nasal saline irrigations postoperatively, postoperative debridement, and follow-up.     Miguel Angel Marcano MD  Department of Otolaryngology-Head and Neck Surgery  [facility]

## 2022-10-10 ENCOUNTER — HEALTH MAINTENANCE LETTER (OUTPATIENT)
Age: 37
End: 2022-10-10

## 2022-10-12 ENCOUNTER — MYC MEDICAL ADVICE (OUTPATIENT)
Dept: OTOLARYNGOLOGY | Facility: CLINIC | Age: 37
End: 2022-10-12

## 2022-10-12 ENCOUNTER — OFFICE VISIT (OUTPATIENT)
Dept: OTOLARYNGOLOGY | Facility: CLINIC | Age: 37
End: 2022-10-12
Payer: COMMERCIAL

## 2022-10-12 VITALS
TEMPERATURE: 96.8 F | DIASTOLIC BLOOD PRESSURE: 66 MMHG | OXYGEN SATURATION: 98 % | SYSTOLIC BLOOD PRESSURE: 102 MMHG | HEART RATE: 74 BPM

## 2022-10-12 DIAGNOSIS — J32.2 CHRONIC ANTERIOR ETHMOIDAL SINUSITIS: ICD-10-CM

## 2022-10-12 DIAGNOSIS — Z87.09 HISTORY OF ACUTE SINUSITIS: ICD-10-CM

## 2022-10-12 DIAGNOSIS — J34.89 NASAL OBSTRUCTION: ICD-10-CM

## 2022-10-12 DIAGNOSIS — Z87.09 HISTORY OF ALLERGIC RHINITIS: ICD-10-CM

## 2022-10-12 DIAGNOSIS — J34.2 DEVIATED NASAL SEPTUM: ICD-10-CM

## 2022-10-12 DIAGNOSIS — J34.3 HYPERTROPHY OF BOTH INFERIOR NASAL TURBINATES: ICD-10-CM

## 2022-10-12 DIAGNOSIS — J32.0 CHRONIC MAXILLARY SINUSITIS: Primary | ICD-10-CM

## 2022-10-12 PROCEDURE — 99214 OFFICE O/P EST MOD 30 MIN: CPT | Performed by: OTOLARYNGOLOGY

## 2022-10-12 NOTE — LETTER
10/12/2022         RE: Teresa Salinas  [id number] [address] [address]        Dear Colleague,    Thank you for referring your patient, Teresa Salinas, to the [facility]. Please see a copy of my visit note below.    Chief Complaint   Patient presents with     Sinus Problem     Patient is here to discuss sinus surgery. Patient states she has many sinus infections and often congested as well. Patient states this has been going on for years.      History of Present Illness  Teresa Salinas is a 37 year old female who presents for follow up. The patient describes symptoms of chronic nasal congestion, rhinorrhea, postnasal drainage for the past several years.  The patient has intermittent symptoms of acute sinusitis where she has increasing congestion, nasal drainage, facial pressure that eventually will respond to antibiotic treatment.  Over course of her pregnancy, she has had approximately 4 or 5 episodes where she is had severe symptoms and required antibiotics.  During the onset of a previous sinus infection episode, she underwent CT imaging.       The patient underwent a sinus CT with acute symptoms on 11/12/2021.  My review of the CT scan shows significant paranasal sinus mucosal thickening in the bilateral maxillary sinuses worse on the right.  There is near complete opacification of the right maxillary sinus.  There is significant thickening of the sinonasal mucosa in the ethmoid systems right greater than left.  The patient has an aplastic right frontal sinus.  The left frontal sinus is well aerated.  The sphenoid sinuses appear normal.  The patient does have a left nasal septal deviation and moderately severe inferior turbinate hypertrophy bilaterally.    She was scheduled for surgery but she became pregnant.  She is interested in pursuing surgery now that she has delivered her baby.     From a symptom standpoint, the patient reports a long history of nose and sinus  "problems as well as chronic allergy issues.  She has had multiple positives on allergy testing in the past and previously underwent allergy immunotherapy.  She had to stop her immunotherapy when she became pregnant and has not returned to using her allergy injection immunotherapy since 2019.  Treatment for her sinus issues have included oral and topical antihistamines, decongestants, topical nasal irrigations, topical nasal steroids.  From a symptom standpoint, the patient reports bilateral nasal obstruction, nasal congestion, rhinorrhea, postnasal drainage.  No prior history of sinus surgery.  No previous nasal trauma.     Past Medical History  Patient Active Problem List   Diagnosis     Allergic rhinitis due to dust mite     Mild intermittent asthma     Family history of thyroid disease     Allergic rhinitis due to pollen     Rh negative state in antepartum period     GBS bacteriuria     GBS (group B Streptococcus carrier), +RV culture, currently pregnant     Infection due to 2019 novel coronavirus      (spontaneous vaginal delivery)     Current Medications    Current Outpatient Medications:      cetirizine (ZYRTEC) 10 MG tablet, Take 10 mg by mouth daily , Disp: 30 tablet, Rfl: 1     Cholecalciferol (VITAMIN D) 2000 UNITS tablet, Take 2,000 Units by mouth daily, Disp: 100 tablet, Rfl: 3     Prenatal Vit-Fe Fumarate-FA (PRENATAL MULTIVITAMIN W/IRON) 27-0.8 MG tablet, Take 1 tablet by mouth daily, Disp: , Rfl:      RABEprazole (ACIPHEX) 20 MG EC tablet, Take 1 tablet (20 mg) by mouth daily (Patient not taking: Reported on 10/12/2022), Disp: 30 tablet, Rfl: 3    Allergies  Allergies   Allergen Reactions     Contrast Dye Shortness Of Breath and Cough     States \"sneezing\" was the reaction.    Tolerated CT PE study during ED visit on 22.  Received benadryl prior to CT.  No side effects noted to contrast dye.     Clindamycin Hives       Social History  Social History     Socioeconomic History     Marital " status:      Spouse name: Braxton     Number of children: Rosangela     Years of education: 16   Occupational History     Occupation: Registered Nurse     Employer: [employer]     Comment: 5.5 years   Tobacco Use     Smoking status: Never     Smokeless tobacco: Never   Vaping Use     Vaping Use: Never used   Substance and Sexual Activity     Alcohol use: No     Alcohol/week: 0.0 standard drinks     Drug use: No     Sexual activity: Yes     Partners: Male   Other Topics Concern     Parent/sibling w/ CABG, MI or angioplasty before 65F 55M? No   Social History Narrative    November 26, 2018            ENVIRONMENTAL HISTORY: [place] The home is heated with a forced air and gas furnace. They do have central air conditioning. The patient's bedroom is furnished with feather/wool bedding or pillows and carpeting in bedroom.  Pets inside the house include 1 dog(s). There is no history of cockroach or mice infestation. There is/are 0 smokers in the house.  The house does not have a damp basement.        Family History  Family History   Problem Relation Age of Onset     Hyperlipidemia Mother      Thyroid Disease Mother      Seasonal/Environmental Allergies Mother      Diabetes Father         typeII     Hypertension Father      Hyperlipidemia Father      Depression Father      Seasonal/Environmental Allergies Father      Asthma Sister      Seasonal/Environmental Allergies Sister      Other - See Comments Sister         May-Thurner syndrome     Thyroid Disease Sister      Hyperlipidemia Maternal Grandmother      Colon Cancer Maternal Grandfather      Chronic Obstructive Pulmonary Disease Paternal Grandmother      Esophageal Cancer Paternal Grandfather        Review of Systems  As per HPI and PMHx, otherwise 10 system review including the head and neck, constitutional, eyes, respiratory, GI, skin, neurologic, lymphatic, endocrine, and allergy  systems is negative.    Physical Exam  /66 (BP Location: Right arm, Patient Position: Chair, Cuff Size: Adult Regular)   Pulse 74   Temp 96.8  F (36  C)   LMP 11/13/2021 (Approximate)   SpO2 98%   GENERAL: Patient is a pleasant, cooperative 37 year old female in no acute distress.  HEAD: Normocephalic, atraumatic.  Hair and scalp are normal.  EYES: Pupils are equal, round, reactive to light and accommodation.  Extraocular movements are intact.  The sclera nonicteric without injection.  The extraocular structures are normal.  EARS: Normal shape and symmetry.  No tenderness when palpating the mastoid or tragal areas bilaterally.     NOSE: Nares are patent.  Nasal mucosa is boggy and inflamed with sticky, inflammatory mucus.  The patient has a leftward nasal septal deviation and severe/marked inferior turbinate hypertrophy left greater than right.  No nasal cavity masses, polyps, or mucopurulence on anterior rhinoscopy.  NEUROLOGIC: Cranial nerves II through XII are grossly intact.  Voice is strong.  Patient is House-Brackmann I/VI bilaterally.  CARDIOVASCULAR: Extremities are warm and well-perfused.  No significant peripheral edema.  RESPIRATORY: Patient has nonlabored breathing without cough, wheeze, stridor.  PSYCHIATRIC: Patient is alert and oriented.  Mood and affect appear normal.  SKIN: Warm and dry.  No scalp, face, or neck lesions noted.    Assessment and Plan     ICD-10-CM    1. Chronic maxillary sinusitis  J32.0 Case Request: FUNCTIONAL ENDOSCOPIC SINUS SURGERY (FESS), SEPTOPLASTY, NOSE, WITH TURBINOPLASTY      2. Chronic anterior ethmoidal sinusitis  J32.2 Case Request: FUNCTIONAL ENDOSCOPIC SINUS SURGERY (FESS), SEPTOPLASTY, NOSE, WITH TURBINOPLASTY      3. History of acute sinusitis  Z87.09 Case Request: FUNCTIONAL ENDOSCOPIC SINUS SURGERY (FESS), SEPTOPLASTY, NOSE, WITH TURBINOPLASTY      4. Nasal obstruction  J34.89 Case Request: FUNCTIONAL ENDOSCOPIC SINUS SURGERY (FESS), SEPTOPLASTY, NOSE,  WITH TURBINOPLASTY      5. Deviated nasal septum  J34.2 Case Request: FUNCTIONAL ENDOSCOPIC SINUS SURGERY (FESS), SEPTOPLASTY, NOSE, WITH TURBINOPLASTY      6. Hypertrophy of both inferior nasal turbinates  J34.3 Case Request: FUNCTIONAL ENDOSCOPIC SINUS SURGERY (FESS), SEPTOPLASTY, NOSE, WITH TURBINOPLASTY      7. History of allergic rhinitis  Z87.09 Case Request: FUNCTIONAL ENDOSCOPIC SINUS SURGERY (FESS), SEPTOPLASTY, NOSE, WITH TURBINOPLASTY         It was my pleasure seeing Teresa Salinas today in clinic.  The patient presents with chronic sinusitis symptoms in the setting of multiple episodes of recurrent acute sinusitis.  She is having at least 4 more episodes per year requiring treatment.  Continue to have problems despite medical management including topical nasal saline irrigations, topical nasal antihistamines, and topical nasal steroids.  She is also had allergy injection immunotherapy in the past but continues to have symptoms.  Based on her CT imaging and previous endoscopic findings, she would be a good candidate for endoscopic sinus surgery.      We discussed the risks, benefits, alternatives, options of endonasal septoplasty, bilateral inferior turbinate reduction, bilateral endoscopic maxillary antrostomies, bilateral endoscopic anterior ethmoidectomies including, but not limited to: Risk of bleeding, risk of infection, risk of postoperative pain, risk of septal hematoma, risk of septal perforation, risk of CSF leak, risk of injury to the orbital contents, risk of intranasal scarring or adhesions, risk of smell disturbance or smell loss, potential need for additional procedures, risk of general anesthesia.  The patient voiced understanding and is willing to proceed.  We discussed the postoperative course and convalescence including lifting and activity restrictions, placement of nasal splints, nasal saline irrigations postoperatively, postoperative debridement, and follow-up.     Miguel Angel Marcano,  MD  Department of Otolaryngology-Head and Neck Surgery  [facility]         Again, thank you for allowing me to participate in the care of your patient.        Sincerely,        Miguel Angel Marcano MD

## 2022-10-12 NOTE — NURSING NOTE
"Initial /66 (BP Location: Right arm, Patient Position: Chair, Cuff Size: Adult Regular)   Pulse 74   Temp 96.8  F (36  C)   LMP 11/13/2021 (Approximate)   SpO2 98%  Estimated body mass index is 28.29 kg/m  as calculated from the following:    Height as of 9/18/22: 1.651 m (5' 5\").    Weight as of 9/18/22: 77.1 kg (170 lb). .  Patient is being seen today for sinus surgery.   Abigail Winn LPN on 10/12/2022 at 8:34 AM    "

## 2022-10-12 NOTE — TELEPHONE ENCOUNTER
Patient was contacted and surgery date was rescheduled per patients request along with post op dates and time.    Abigail Winn LPN on 10/12/2022 at 11:24 AM

## 2022-10-19 ENCOUNTER — PRENATAL OFFICE VISIT (OUTPATIENT)
Dept: OBGYN | Facility: CLINIC | Age: 37
End: 2022-10-19
Payer: COMMERCIAL

## 2022-10-19 VITALS
HEIGHT: 65 IN | DIASTOLIC BLOOD PRESSURE: 62 MMHG | SYSTOLIC BLOOD PRESSURE: 112 MMHG | HEART RATE: 74 BPM | BODY MASS INDEX: 28.49 KG/M2 | TEMPERATURE: 98.9 F | RESPIRATION RATE: 18 BRPM | WEIGHT: 171 LBS

## 2022-10-19 DIAGNOSIS — Z30.017 NEXPLANON INSERTION: ICD-10-CM

## 2022-10-19 DIAGNOSIS — Z30.017 INSERTION OF IMPLANTABLE SUBDERMAL CONTRACEPTIVE: ICD-10-CM

## 2022-10-19 PROCEDURE — 11981 INSERTION DRUG DLVR IMPLANT: CPT | Performed by: STUDENT IN AN ORGANIZED HEALTH CARE EDUCATION/TRAINING PROGRAM

## 2022-10-19 PROCEDURE — 99207 PR POST PARTUM EXAM: CPT | Mod: 25 | Performed by: STUDENT IN AN ORGANIZED HEALTH CARE EDUCATION/TRAINING PROGRAM

## 2022-10-19 ASSESSMENT — ANXIETY QUESTIONNAIRES
7. FEELING AFRAID AS IF SOMETHING AWFUL MIGHT HAPPEN: NOT AT ALL
GAD7 TOTAL SCORE: 1
6. BECOMING EASILY ANNOYED OR IRRITABLE: SEVERAL DAYS
3. WORRYING TOO MUCH ABOUT DIFFERENT THINGS: NOT AT ALL
5. BEING SO RESTLESS THAT IT IS HARD TO SIT STILL: NOT AT ALL
2. NOT BEING ABLE TO STOP OR CONTROL WORRYING: NOT AT ALL
GAD7 TOTAL SCORE: 1
1. FEELING NERVOUS, ANXIOUS, OR ON EDGE: NOT AT ALL
IF YOU CHECKED OFF ANY PROBLEMS ON THIS QUESTIONNAIRE, HOW DIFFICULT HAVE THESE PROBLEMS MADE IT FOR YOU TO DO YOUR WORK, TAKE CARE OF THINGS AT HOME, OR GET ALONG WITH OTHER PEOPLE: NOT DIFFICULT AT ALL

## 2022-10-19 ASSESSMENT — PATIENT HEALTH QUESTIONNAIRE - PHQ9
5. POOR APPETITE OR OVEREATING: NOT AT ALL
SUM OF ALL RESPONSES TO PHQ QUESTIONS 1-9: 3

## 2022-10-19 NOTE — PROGRESS NOTES
[facility]  Post Partum Office Visit    Assessment and Plan:   Teresa Salinas is a 37 year old  who presents for a post-partum visit.    Postpartum care  - Appropriate post partum recovery.  - Contraction: Nexplanon placed today.  - Most recent Pap smear was on 2018 - NILM w/ neg HPV.  Pap smear was not obtained today.  - Continue a multi vitamin supplement, especially if breastfeeding.    Nexplanon insertion procedure note:   Patient was consented for Nexplanon placement. The patient's right arm was flexed and externally rotated with her wrist parallel to her ear.  After cleaning the area with alcohol wipe, a total of 5 cc of 1% plain lidocaine was injected at 10 cm proximal from the medial epicondyles of the humerus and 3 cm posterior to the sulcus between biceps and triceps muscle.  The injection site was again cleaned with Betadine swabs.  The tip of the Nexplanon applicator was then inserted at a 30 degree angle and the needle was advanced the full length keeping the skin tented during insertion. The applicator seal was broken and the cannula was then retracted. The patient's arm was examined and the Nexplanon landon was easily palpated. The patient also palpated her arm and was able to appreciate the presence of the landon. Pressure was held at the insertion robyn. A pressure bandage was then placed using a small piece of gauze and Band-Aid. The patient tolerated the procedure very well.   EBL: <5 mL   Complications: None   Nexplanon Lot number: [id number]  Expiration date: 2024     Carola Srivastava MD  Office phone: [phone]  Obstetrics and Gynecology  [facility]   10/19/2022    ---------------------------------------------------------------------------------------------------------------------------  S: Teresa Salinas is a 37 year old  who presents for a 6 week post-partum visit.  Patient delivered on 2022, by Dr. Turner at 39w5d  "gestation.  Patient delivered via , with 2nd degree laceration.  Patient's pregnancy was complicated by GBS bacteriuria, Rh- status, AMA and circumvallate placenta.  Her delivery was uncomplicated.    Has not yet resumed sexual intercourse.  Denied mood concerns.    O:  Vitals:    10/19/22 1434   BP: 112/62   BP Location: Left arm   Patient Position: Chair   Cuff Size: Adult Regular   Pulse: 74   Resp: 18   Temp: 98.9  F (37.2  C)   TempSrc: Tympanic   Weight: 77.6 kg (171 lb)   Height: 1.651 m (5' 5\")      Estimated body mass index is 28.46 kg/m  as calculated from the following:    Height as of this encounter: 1.651 m (5' 5\").    Weight as of this encounter: 77.6 kg (171 lb).    General appearance: well-hydrated, A&O x 3, no apparent distress  Lungs: Equal expansion bilaterally, no accessory muscle use  Heart: No heaves or thrills. No peripheral varicosities    "

## 2022-10-19 NOTE — NURSING NOTE
"Initial /62 (BP Location: Left arm, Patient Position: Chair, Cuff Size: Adult Regular)   Pulse 74   Temp 98.9  F (37.2  C) (Tympanic)   Resp 18   Ht 1.651 m (5' 5\")   Wt 77.6 kg (171 lb)   LMP 11/13/2021 (Approximate)   Breastfeeding Yes   BMI 28.46 kg/m   Estimated body mass index is 28.46 kg/m  as calculated from the following:    Height as of this encounter: 1.651 m (5' 5\").    Weight as of this encounter: 77.6 kg (171 lb). .      "

## 2022-10-21 ENCOUNTER — OFFICE VISIT (OUTPATIENT)
Dept: FAMILY MEDICINE | Facility: CLINIC | Age: 37
End: 2022-10-21
Payer: COMMERCIAL

## 2022-10-21 VITALS
WEIGHT: 171 LBS | TEMPERATURE: 98 F | DIASTOLIC BLOOD PRESSURE: 62 MMHG | OXYGEN SATURATION: 98 % | RESPIRATION RATE: 14 BRPM | HEIGHT: 65 IN | HEART RATE: 76 BPM | SYSTOLIC BLOOD PRESSURE: 108 MMHG | BODY MASS INDEX: 28.49 KG/M2

## 2022-10-21 DIAGNOSIS — J45.20 MILD INTERMITTENT ASTHMA WITHOUT COMPLICATION: ICD-10-CM

## 2022-10-21 DIAGNOSIS — J34.2 DEVIATED NASAL SEPTUM: ICD-10-CM

## 2022-10-21 DIAGNOSIS — Z01.818 PREOP GENERAL PHYSICAL EXAM: Primary | ICD-10-CM

## 2022-10-21 DIAGNOSIS — J32.9 CHRONIC SINUSITIS, UNSPECIFIED LOCATION: ICD-10-CM

## 2022-10-21 PROCEDURE — 90686 IIV4 VACC NO PRSV 0.5 ML IM: CPT | Performed by: NURSE PRACTITIONER

## 2022-10-21 PROCEDURE — 99214 OFFICE O/P EST MOD 30 MIN: CPT | Mod: 25 | Performed by: NURSE PRACTITIONER

## 2022-10-21 PROCEDURE — 90471 IMMUNIZATION ADMIN: CPT | Performed by: NURSE PRACTITIONER

## 2022-10-21 RX ORDER — ALBUTEROL SULFATE 90 UG/1
2 AEROSOL, METERED RESPIRATORY (INHALATION) EVERY 4 HOURS PRN
Qty: 18 G | Refills: 3 | Status: SHIPPED | OUTPATIENT
Start: 2022-10-21 | End: 2024-09-25

## 2022-10-21 ASSESSMENT — PAIN SCALES - GENERAL: PAINLEVEL: NO PAIN (0)

## 2022-10-21 NOTE — PATIENT INSTRUCTIONS
Preparing for Your Surgery  Getting started  A nurse will call you to review your health history and instructions. They will give you an arrival time based on your scheduled surgery time. Please be ready to share:    Your doctor's clinic name and phone number    Your medical, surgical and anesthesia history    A list of allergies and sensitivities    A list of medicines, including herbal treatments and over-the-counter drugs    Whether the patient has a legal guardian (ask how to send us the papers in advance)  Please tell us if you're pregnant--or if there's any chance you might be pregnant. Some surgeries may injure a fetus (unborn baby), so they require a pregnancy test. Surgeries that are safe for a fetus don't always need a test, and you can choose whether to have one.   If you have a child who's having surgery, please ask for a copy of Preparing for Your Child's Surgery.    Preparing for surgery    Within 10 to 30 days of surgery: Have a pre-op exam (sometimes called an H&P, or History and Physical). This can be done at a clinic or pre-operative center.  ? If you're having a , you may not need this exam. Talk to your care team.    At your pre-op exam, talk to your care team about all medicines you take. If you need to stop any medicines before surgery, ask when to start taking them again.  ? We do this for your safety. Many medicines can make you bleed too much during surgery. Some change how well surgery (anesthesia) drugs work.    Call your insurance company to let them know you're having surgery. (If you don't have insurance, call [phone].)    Call your clinic if there's any change in your health. This includes signs of a cold or flu (sore throat, runny nose, cough, rash, fever). It also includes a scrape or scratch near the surgery site.    If you have questions on the day of surgery, call your hospital or surgery center.  COVID testing  You may need to be tested for COVID-19 before having  surgery. If so, we will give you instructions (or click here).  Eating and drinking guidelines  For your safety: Unless your surgeon tells you otherwise, follow the guidelines below.    Eat and drink as usual until 8 hours before surgery. After that, no food or milk.    Drink clear liquids until 2 hours before surgery. These are liquids you can see through, like water, Gatorade and Propel Water. You may also have black coffee and tea (no cream or milk).    Nothing by mouth within 2 hours of surgery. This includes gum, candy and breath mints.    If you drink alcohol: Stop drinking it the night before surgery.    If your care team tells you to take medicine on the morning of surgery, it's okay to take it with a sip of water.  Preventing infection    Shower or bathe the night before and morning of your surgery. Follow the instructions your clinic gave you. (If no instructions, use regular soap.)    Don't shave or clip hair near your surgery site. We'll remove the hair if needed.    Don't smoke or vape the morning of surgery. You may chew nicotine gum up to 2 hours before surgery. A nicotine patch is okay.  ? Note: Some surgeries require you to completely quit smoking and nicotine. Check with your surgeon.    Your care team will make every effort to keep you safe from infection. We will:  ? Clean our hands often with soap and water (or an alcohol-based hand rub).  ? Clean the skin at your surgery site with a special soap that kills germs.  ? Give you a special gown to keep you warm. (Cold raises the risk of infection.)  ? Wear special hair covers, masks, gowns and gloves during surgery.  ? Give antibiotic medicine, if prescribed. Not all surgeries need antibiotics.  What to bring on the day of surgery    Photo ID and insurance card    Copy of your health care directive, if you have one    Glasses and hearing aides (bring cases)  ? You can't wear contacts during surgery    Inhaler and eye drops, if you use them (tell us  about these when you arrive)    CPAP machine or breathing device, if you use them    A few personal items, if spending the night    If you have . . .  ? A pacemaker, ICD (cardiac defibrillator) or other implant: Bring the ID card.  ? An implanted stimulator: Bring the remote control.  ? A legal guardian: Bring a copy of the certified (court-stamped) guardianship papers.  Please remove any jewelry, including body piercings. Leave jewelry and other valuables at home.  If you're going home the day of surgery    You must have a responsible adult drive you home. They should stay with you overnight as well.    If you don't have someone to stay with you, and you aren't safe to go home alone, we may keep you overnight. Insurance often won't pay for this.  After surgery  If it's hard to control your pain or you need more pain medicine, please call your surgeon's office.  Questions?   If you have any questions for your care team, list them here: _________________________________________________________________________________________________________________________________________________________________________ ____________________________________ ____________________________________ ____________________________________  For informational purposes only. Not to replace the advice of your health care provider. Copyright   2003, 2019 [facility]. All rights reserved. Clinically reviewed by Shani Rosario MD. [employer] [id number] - REV 07/22.

## 2022-10-21 NOTE — PROGRESS NOTES
[facility]  [address] [address]  Phone: [phone]  Primary Provider: Kacie Vazquez  Pre-op Performing Provider: MARTI FAM    PREOPERATIVE EVALUATION:  Today's date: 10/21/2022    Teresa Salinas is a 37 year old female who presents for a preoperative evaluation.    Surgical Information:  Surgery/Procedure:   FUNCTIONAL ENDOSCOPIC SINUS SURGERY (FESS) Bilateral General   SEPTOPLASTY, NOSE, WITH TURBINOPLASTY         Surgery Location: [facility] OR  Surgeon:Miguel Angel Marcano MD  Surgery Date: 10/28/2022  Time of Surgery: 12:00 PM  Where patient plans to recover: At home with family  Fax number for surgical facility: Note does not need to be faxed, will be available electronically in Epic.    Type of Anesthesia Anticipated: General    Assessment & Plan     The proposed surgical procedure is considered INTERMEDIATE risk.    Preop general physical exam  Patient is a healthy 37 year old here for Pre-op H&P.  No labs or EKG indicated since she is not on any medications that affect kidney function and electrolytes and she has no symptoms or cardiac history.    Chronic sinusitis, unspecified location  Current sinus congestion.    Deviated nasal septum  Current sinus congestion.    Mild intermittent asthma without complication  Hx of intermittent asthma.  Refilled inhaler since she has not needed one in awhile and recommended that she bring this to surgery as well.      Risks and Recommendations:  The patient has the following additional risks and recommendations for perioperative complications:   - No identified additional risk factors other than previously addressed    Medication Instructions:  Patient is to take all scheduled medications on the day of surgery EXCEPT for modifications listed below:   Hold NSAIDs for 7 days and vitamins day of surgery.    RECOMMENDATION:  APPROVAL GIVEN to proceed with proposed procedure, without further diagnostic  evaluation.    Subjective     HPI related to upcoming procedure: History of chronic sinus infections and deviated septum.  Undergoing surgery to correct this.    Preop Questions 10/20/2022   1. Have you ever had a heart attack or stroke? No   2. Have you ever had surgery on your heart or blood vessels, such as a stent placement, a coronary artery bypass, or surgery on an artery in your head, neck, heart, or legs? No   3. Do you have chest pain with activity? No   4. Do you have a history of  heart failure? No   5. Do you currently have a cold, bronchitis or symptoms of other infection? YES - sinus congestion currently but improving.   6. Do you have a cough, shortness of breath, or wheezing? No   7. Do you or anyone in your family have previous history of blood clots? YES - Sister with May-Lama syndrome   8. Do you or does anyone in your family have a serious bleeding problem such as prolonged bleeding following surgeries or cuts? No   9. Have you ever had problems with anemia or been told to take iron pills? No   10. Have you had any abnormal blood loss such as black, tarry or bloody stools, or abnormal vaginal bleeding? No   11. Have you ever had a blood transfusion? No   12. Are you willing to have a blood transfusion if it is medically needed before, during, or after your surgery? Yes   13. Have you or any of your relatives ever had problems with anesthesia? YES - self - PONV   14. Do you have sleep apnea, excessive snoring or daytime drowsiness? No   15. Do you have any artifical heart valves or other implanted medical devices like a pacemaker, defibrillator, or continuous glucose monitor? No   16. Do you have artificial joints? No   17. Are you allergic to latex? No   18. Is there any chance that you may be pregnant? No       Health Care Directive:  Patient does not have a Health Care Directive or Living Will: Discussed advance care planning with patient; information given to patient to  review.    Preoperative Review of :   reviewed - no record of controlled substances prescribed.    Status of Chronic Conditions:  ASTHMA - Patient has a longstanding history of moderate-severe Asthma . Patient has been doing well overall noting NO SYMPTOMS and continues on medication regimen consisting of as needed albuterol without adverse reactions or side effects.       Review of Systems  CONSTITUTIONAL: NEGATIVE for fever, chills, change in weight  INTEGUMENTARY/SKIN: NEGATIVE for worrisome rashes, moles or lesions  EYES: NEGATIVE for vision changes or irritation  ENT/MOUTH: NEGATIVE for ear, mouth and throat problems  RESP: NEGATIVE for significant cough or SOB  BREAST: NEGATIVE for masses, tenderness or discharge  CV: NEGATIVE for chest pain, palpitations or peripheral edema  GI: NEGATIVE for nausea, abdominal pain, heartburn, or change in bowel habits  : NEGATIVE for frequency, dysuria, or hematuria  MUSCULOSKELETAL: NEGATIVE for significant arthralgias or myalgia  NEURO: NEGATIVE for weakness, dizziness or paresthesias  ENDOCRINE: NEGATIVE for temperature intolerance, skin/hair changes  HEME: NEGATIVE for bleeding problems  PSYCHIATRIC: NEGATIVE for changes in mood or affect    Patient Active Problem List    Diagnosis Date Noted     Nexplanon insertion 10/19/2022     Priority: Medium     10/19/22 nexplanon insertion lot# [id number]        (spontaneous vaginal delivery) 2022     Priority: Medium     GBS (group B Streptococcus carrier), +RV culture, currently pregnant 2022     Priority: Medium     Infection due to 2019 novel coronavirus 2022     Priority: Medium     GBS bacteriuria 2019     Priority: Medium     Rh negative state in antepartum period 2019     Priority: Medium     Allergic rhinitis due to pollen 2018     Priority: Medium     Percutaneous skin puncture testing for aeroallergens performed on 2018 showed sensitivity to dog and  "pollen of grass mix #7.       Mild intermittent asthma 06/12/2015     Priority: Medium     Family history of thyroid disease 06/12/2015     Priority: Medium     Allergic rhinitis due to dust mite 03/11/2014     Priority: Medium      Past Medical History:   Diagnosis Date     Allergic rhinitis 3/11/2014     Asthma 3/11/2014     Chickenpox      Depression      Family history of thyroid disease 6/12/2015     Past Surgical History:   Procedure Laterality Date     Arthoscopic right ankle Right 1999     OSTEOTOMY ANKLE Right 2003     Current Outpatient Medications   Medication Sig Dispense Refill     cetirizine (ZYRTEC) 10 MG tablet Take 10 mg by mouth daily  30 tablet 1     Cholecalciferol (VITAMIN D) 2000 UNITS tablet Take 2,000 Units by mouth daily 100 tablet 3     etonogestrel (NEXPLANON) 68 MG IMPL 1 each (68 mg) by Subdermal route once       Prenatal Vit-Fe Fumarate-FA (PRENATAL MULTIVITAMIN W/IRON) 27-0.8 MG tablet Take 1 tablet by mouth daily       RABEprazole (ACIPHEX) 20 MG EC tablet Take 1 tablet (20 mg) by mouth daily (Patient not taking: No sig reported) 30 tablet 3       Allergies   Allergen Reactions     Contrast Dye Shortness Of Breath and Cough     States \"sneezing\" was the reaction.    Tolerated CT PE study during ED visit on 9/19/22.  Received benadryl prior to CT.  No side effects noted to contrast dye.     Clindamycin Hives        Social History     Tobacco Use     Smoking status: Never     Smokeless tobacco: Never   Substance Use Topics     Alcohol use: No     Alcohol/week: 0.0 standard drinks     Family History   Problem Relation Age of Onset     Hyperlipidemia Mother      Thyroid Disease Mother      Seasonal/Environmental Allergies Mother      Diabetes Father         typeII     Hypertension Father      Hyperlipidemia Father      Depression Father      Seasonal/Environmental Allergies Father      Asthma Sister      Seasonal/Environmental Allergies Sister      Other - See Comments Sister         " "May-Thurner syndrome     Thyroid Disease Sister      Hyperlipidemia Maternal Grandmother      Colon Cancer Maternal Grandfather      Chronic Obstructive Pulmonary Disease Paternal Grandmother      Esophageal Cancer Paternal Grandfather      History   Drug Use No         Objective     /62   Pulse 76   Temp 98  F (36.7  C) (Tympanic)   Resp 14   Ht 1.651 m (5' 5\")   Wt 77.6 kg (171 lb)   LMP 11/13/2021 (Approximate)   SpO2 98%   BMI 28.46 kg/m      Physical Exam    GENERAL APPEARANCE: healthy, alert and no distress     EYES: EOMI, PERRL     HENT: ear canals and TM's normal and nose and mouth without ulcers or lesions     NECK: no adenopathy, no asymmetry, masses, or scars and thyroid normal to palpation     RESP: lungs clear to auscultation - no rales, rhonchi or wheezes     CV: regular rates and rhythm, normal S1 S2, no S3 or S4 and no murmur, click or rub     ABDOMEN:  soft, nontender, no HSM or masses and bowel sounds normal     MS: extremities normal- no gross deformities noted, no evidence of inflammation in joints, FROM in all extremities.     SKIN: no suspicious lesions or rashes     NEURO: Normal strength and tone, sensory exam grossly normal, mentation intact and speech normal     PSYCH: mentation appears normal. and affect normal/bright     LYMPHATICS: No cervical adenopathy    Recent Labs   Lab Test 09/18/22  2304 09/05/22  1851 09/05/22  1045   HGB 13.0  --  12.6    163 174     --   --    POTASSIUM 4.3  --   --    CR 0.68 0.59  --         Diagnostics:  No labs were ordered during this visit.   No EKG required, no history of coronary heart disease, significant arrhythmia, peripheral arterial disease or other structural heart disease.    Revised Cardiac Risk Index (RCRI):  The patient has the following serious cardiovascular risks for perioperative complications:   - No serious cardiac risks = 0 points     RCRI Interpretation: 0 points: Class I (very low risk - 0.4% complication " rate)    Signed Electronically by: Gayle Chao NP  Copy of this evaluation report is provided to requesting physician.

## 2022-10-26 ENCOUNTER — ANESTHESIA EVENT (OUTPATIENT)
Dept: SURGERY | Facility: CLINIC | Age: 37
End: 2022-10-26
Payer: COMMERCIAL

## 2022-10-26 NOTE — ANESTHESIA PREPROCEDURE EVALUATION
"Anesthesia Pre-Procedure Evaluation    Patient: Teresa Salinas   MRN: [id number] : 1985        Procedure : Procedure(s):  FUNCTIONAL ENDOSCOPIC SINUS SURGERY (FESS)  SEPTOPLASTY, NOSE, WITH TURBINOPLASTY          Past Medical History:   Diagnosis Date     Allergic rhinitis 3/11/2014     Asthma 3/11/2014     Chickenpox      Depression      Family history of thyroid disease 2015      Past Surgical History:   Procedure Laterality Date     Arthoscopic right ankle Right      OSTEOTOMY ANKLE Right       Allergies   Allergen Reactions     Contrast Dye Shortness Of Breath and Cough     States \"sneezing\" was the reaction.    Tolerated CT PE study during ED visit on 22.  Received benadryl prior to CT.  No side effects noted to contrast dye.     Clindamycin Hives      Social History     Tobacco Use     Smoking status: Never     Smokeless tobacco: Never   Substance Use Topics     Alcohol use: No     Alcohol/week: 0.0 standard drinks      Wt Readings from Last 1 Encounters:   10/21/22 77.6 kg (171 lb)        Anesthesia Evaluation   Pt has had prior anesthetic.         ROS/MED HX  ENT/Pulmonary:     (+) allergic rhinitis, asthma     Neurologic:       Cardiovascular:       METS/Exercise Tolerance:     Hematologic:       Musculoskeletal:       GI/Hepatic:       Renal/Genitourinary:       Endo:       Psychiatric/Substance Use:     (+) psychiatric history depression     Infectious Disease:       Malignancy:       Other:            Physical Exam    Airway        Mallampati: I   TM distance: > 3 FB   Neck ROM: full   Mouth opening: > 3 cm    Respiratory Devices and Support         Dental  no notable dental history         Cardiovascular   cardiovascular exam normal          Pulmonary   pulmonary exam normal                OUTSIDE LABS:  CBC:   Lab Results   Component Value Date    WBC 6.5 2022    WBC 7.5 2022    HGB 13.0 2022    HGB 12.6 2022    HCT 40.1 2022    HCT 38.8 " 09/05/2022     09/18/2022     09/05/2022     BMP:   Lab Results   Component Value Date     09/18/2022    POTASSIUM 4.3 09/18/2022    CHLORIDE 107 09/18/2022    CO2 27 09/18/2022    BUN 9.6 09/18/2022    CR 0.68 09/18/2022    CR 0.59 09/05/2022     (H) 09/18/2022    GLC 87 06/12/2015     COAGS: No results found for: PTT, INR, FIBR  POC: No results found for: BGM, HCG, HCGS  HEPATIC:   Lab Results   Component Value Date    ALBUMIN 3.7 09/18/2022    PROTTOTAL 6.5 09/18/2022    ALT 36 (H) 09/18/2022    AST 22 09/18/2022    ALKPHOS 102 09/18/2022    BILITOTAL 0.2 09/18/2022     OTHER:   Lab Results   Component Value Date    TRACY 9.0 09/18/2022    TSH 1.38 06/12/2015    T4 1.05 06/12/2015       Anesthesia Plan    ASA Status:  2      Anesthesia Type: General.              Consents    Anesthesia Plan(s) and associated risks, benefits, and realistic alternatives discussed. Questions answered and patient/representative(s) expressed understanding.     - Discussed: Risks, Benefits and Alternatives for BOTH SEDATION and the PROCEDURE were discussed     - Discussed with:  Patient         Postoperative Care       PONV prophylaxis: Ondansetron (or other 5HT-3), Dexamethasone or Solumedrol     Comments:                BETTINA Felix CRNA

## 2022-10-28 ENCOUNTER — ANESTHESIA (OUTPATIENT)
Dept: SURGERY | Facility: CLINIC | Age: 37
End: 2022-10-28
Payer: COMMERCIAL

## 2022-10-28 ENCOUNTER — HOSPITAL ENCOUNTER (OUTPATIENT)
Facility: CLINIC | Age: 37
Discharge: HOME | End: 2022-10-28
Attending: OTOLARYNGOLOGY | Admitting: OTOLARYNGOLOGY
Payer: COMMERCIAL

## 2022-10-28 VITALS
WEIGHT: 171.08 LBS | HEIGHT: 65 IN | TEMPERATURE: 97.8 F | RESPIRATION RATE: 18 BRPM | DIASTOLIC BLOOD PRESSURE: 67 MMHG | OXYGEN SATURATION: 97 % | SYSTOLIC BLOOD PRESSURE: 111 MMHG | HEART RATE: 74 BPM | BODY MASS INDEX: 28.5 KG/M2

## 2022-10-28 DIAGNOSIS — Z98.890 STATUS POST FUNCTIONAL ENDOSCOPIC SINUS SURGERY: Primary | ICD-10-CM

## 2022-10-28 PROCEDURE — 258N000003 HC RX IP 258 OP 636: Performed by: NURSE ANESTHETIST, CERTIFIED REGISTERED

## 2022-10-28 PROCEDURE — 31254 NSL/SINS NDSC W/PRTL ETHMDCT: CPT | Mod: 50 | Performed by: OTOLARYNGOLOGY

## 2022-10-28 PROCEDURE — 999N000141 HC STATISTIC PRE-PROCEDURE NURSING ASSESSMENT: Performed by: OTOLARYNGOLOGY

## 2022-10-28 PROCEDURE — 250N000011 HC RX IP 250 OP 636: Performed by: NURSE ANESTHETIST, CERTIFIED REGISTERED

## 2022-10-28 PROCEDURE — 250N000011 HC RX IP 250 OP 636: Performed by: OTOLARYNGOLOGY

## 2022-10-28 PROCEDURE — 272N000001 HC OR GENERAL SUPPLY STERILE: Performed by: OTOLARYNGOLOGY

## 2022-10-28 PROCEDURE — 30520 REPAIR OF NASAL SEPTUM: CPT | Performed by: OTOLARYNGOLOGY

## 2022-10-28 PROCEDURE — 250N000009 HC RX 250: Performed by: NURSE ANESTHETIST, CERTIFIED REGISTERED

## 2022-10-28 PROCEDURE — 250N000009 HC RX 250: Performed by: OTOLARYNGOLOGY

## 2022-10-28 PROCEDURE — 250N000013 HC RX MED GY IP 250 OP 250 PS 637: Performed by: OTOLARYNGOLOGY

## 2022-10-28 PROCEDURE — 710N000009 HC RECOVERY PHASE 1, LEVEL 1, PER MIN: Performed by: OTOLARYNGOLOGY

## 2022-10-28 PROCEDURE — 710N000012 HC RECOVERY PHASE 2, PER MINUTE: Performed by: OTOLARYNGOLOGY

## 2022-10-28 PROCEDURE — 360N000076 HC SURGERY LEVEL 3, PER MIN: Performed by: OTOLARYNGOLOGY

## 2022-10-28 PROCEDURE — 31267 ENDOSCOPY MAXILLARY SINUS: CPT | Mod: 50 | Performed by: OTOLARYNGOLOGY

## 2022-10-28 PROCEDURE — 370N000017 HC ANESTHESIA TECHNICAL FEE, PER MIN: Performed by: OTOLARYNGOLOGY

## 2022-10-28 PROCEDURE — 30140 RESECT INFERIOR TURBINATE: CPT | Mod: 50 | Performed by: OTOLARYNGOLOGY

## 2022-10-28 PROCEDURE — 250N000025 HC SEVOFLURANE, PER MIN: Performed by: OTOLARYNGOLOGY

## 2022-10-28 RX ORDER — ACETAMINOPHEN 500 MG
1000 TABLET ORAL EVERY 6 HOURS PRN
Qty: 200 TABLET | Refills: 1 | Status: SHIPPED | OUTPATIENT
Start: 2022-10-28 | End: 2022-11-07

## 2022-10-28 RX ORDER — MEPERIDINE HYDROCHLORIDE 25 MG/ML
12.5 INJECTION INTRAMUSCULAR; INTRAVENOUS; SUBCUTANEOUS
Status: DISCONTINUED | OUTPATIENT
Start: 2022-10-28 | End: 2022-10-28 | Stop reason: HOSPADM

## 2022-10-28 RX ORDER — LIDOCAINE 40 MG/G
CREAM TOPICAL
Status: DISCONTINUED | OUTPATIENT
Start: 2022-10-28 | End: 2022-10-28 | Stop reason: HOSPADM

## 2022-10-28 RX ORDER — ONDANSETRON 4 MG/1
4 TABLET, ORALLY DISINTEGRATING ORAL ONCE
Status: DISCONTINUED | OUTPATIENT
Start: 2022-10-28 | End: 2022-10-28 | Stop reason: HOSPADM

## 2022-10-28 RX ORDER — NALOXONE HYDROCHLORIDE 0.4 MG/ML
0.4 INJECTION, SOLUTION INTRAMUSCULAR; INTRAVENOUS; SUBCUTANEOUS
Status: DISCONTINUED | OUTPATIENT
Start: 2022-10-28 | End: 2022-10-28 | Stop reason: HOSPADM

## 2022-10-28 RX ORDER — PROPOFOL 10 MG/ML
INJECTION, EMULSION INTRAVENOUS PRN
Status: DISCONTINUED | OUTPATIENT
Start: 2022-10-28 | End: 2022-10-28

## 2022-10-28 RX ORDER — ONDANSETRON 4 MG/1
4 TABLET, ORALLY DISINTEGRATING ORAL EVERY 30 MIN PRN
Status: DISCONTINUED | OUTPATIENT
Start: 2022-10-28 | End: 2022-10-28 | Stop reason: HOSPADM

## 2022-10-28 RX ORDER — CEFAZOLIN SODIUM/WATER 2 G/20 ML
2 SYRINGE (ML) INTRAVENOUS
Status: COMPLETED | OUTPATIENT
Start: 2022-10-28 | End: 2022-10-28

## 2022-10-28 RX ORDER — MAGNESIUM SULFATE HEPTAHYDRATE 40 MG/ML
2 INJECTION, SOLUTION INTRAVENOUS ONCE
Status: DISCONTINUED | OUTPATIENT
Start: 2022-10-28 | End: 2022-10-28 | Stop reason: HOSPADM

## 2022-10-28 RX ORDER — IBUPROFEN 600 MG/1
600 TABLET, FILM COATED ORAL ONCE
Status: COMPLETED | OUTPATIENT
Start: 2022-10-28 | End: 2022-10-28

## 2022-10-28 RX ORDER — IBUPROFEN 200 MG
600 TABLET ORAL EVERY 6 HOURS PRN
Qty: 200 TABLET | Refills: 1 | Status: SHIPPED | OUTPATIENT
Start: 2022-10-28 | End: 2022-11-07

## 2022-10-28 RX ORDER — HALOPERIDOL 5 MG/ML
1 INJECTION INTRAMUSCULAR
Status: DISCONTINUED | OUTPATIENT
Start: 2022-10-28 | End: 2022-10-28 | Stop reason: HOSPADM

## 2022-10-28 RX ORDER — DIMENHYDRINATE 50 MG/ML
25 INJECTION, SOLUTION INTRAMUSCULAR; INTRAVENOUS
Status: DISCONTINUED | OUTPATIENT
Start: 2022-10-28 | End: 2022-10-28 | Stop reason: HOSPADM

## 2022-10-28 RX ORDER — SODIUM CHLORIDE, SODIUM LACTATE, POTASSIUM CHLORIDE, CALCIUM CHLORIDE 600; 310; 30; 20 MG/100ML; MG/100ML; MG/100ML; MG/100ML
INJECTION, SOLUTION INTRAVENOUS CONTINUOUS
Status: DISCONTINUED | OUTPATIENT
Start: 2022-10-28 | End: 2022-10-28 | Stop reason: HOSPADM

## 2022-10-28 RX ORDER — DEXAMETHASONE SODIUM PHOSPHATE 4 MG/ML
INJECTION, SOLUTION INTRA-ARTICULAR; INTRALESIONAL; INTRAMUSCULAR; INTRAVENOUS; SOFT TISSUE PRN
Status: DISCONTINUED | OUTPATIENT
Start: 2022-10-28 | End: 2022-10-28

## 2022-10-28 RX ORDER — CEFAZOLIN SODIUM/WATER 2 G/20 ML
2 SYRINGE (ML) INTRAVENOUS SEE ADMIN INSTRUCTIONS
Status: DISCONTINUED | OUTPATIENT
Start: 2022-10-28 | End: 2022-10-28 | Stop reason: HOSPADM

## 2022-10-28 RX ORDER — ACETAMINOPHEN 325 MG/1
975 TABLET ORAL ONCE
Status: COMPLETED | OUTPATIENT
Start: 2022-10-28 | End: 2022-10-28

## 2022-10-28 RX ORDER — OXYMETAZOLINE HYDROCHLORIDE 0.05 G/100ML
SPRAY NASAL PRN
Status: DISCONTINUED | OUTPATIENT
Start: 2022-10-28 | End: 2022-10-28 | Stop reason: HOSPADM

## 2022-10-28 RX ORDER — FENTANYL CITRATE 50 UG/ML
50 INJECTION, SOLUTION INTRAMUSCULAR; INTRAVENOUS
Status: DISCONTINUED | OUTPATIENT
Start: 2022-10-28 | End: 2022-10-28 | Stop reason: HOSPADM

## 2022-10-28 RX ORDER — LIDOCAINE HYDROCHLORIDE AND EPINEPHRINE 10; 10 MG/ML; UG/ML
INJECTION, SOLUTION INFILTRATION; PERINEURAL PRN
Status: DISCONTINUED | OUTPATIENT
Start: 2022-10-28 | End: 2022-10-28 | Stop reason: HOSPADM

## 2022-10-28 RX ORDER — FENTANYL CITRATE 0.05 MG/ML
INJECTION, SOLUTION INTRAMUSCULAR; INTRAVENOUS PRN
Status: DISCONTINUED | OUTPATIENT
Start: 2022-10-28 | End: 2022-10-28

## 2022-10-28 RX ORDER — GLYCOPYRROLATE 0.2 MG/ML
INJECTION, SOLUTION INTRAMUSCULAR; INTRAVENOUS PRN
Status: DISCONTINUED | OUTPATIENT
Start: 2022-10-28 | End: 2022-10-28

## 2022-10-28 RX ORDER — ALBUTEROL SULFATE 0.83 MG/ML
2.5 SOLUTION RESPIRATORY (INHALATION) EVERY 4 HOURS PRN
Status: DISCONTINUED | OUTPATIENT
Start: 2022-10-28 | End: 2022-10-28 | Stop reason: HOSPADM

## 2022-10-28 RX ORDER — FENTANYL CITRATE 50 UG/ML
50 INJECTION, SOLUTION INTRAMUSCULAR; INTRAVENOUS EVERY 5 MIN PRN
Status: DISCONTINUED | OUTPATIENT
Start: 2022-10-28 | End: 2022-10-28 | Stop reason: HOSPADM

## 2022-10-28 RX ORDER — ACETAMINOPHEN 325 MG/1
975 TABLET ORAL ONCE
Status: DISCONTINUED | OUTPATIENT
Start: 2022-10-28 | End: 2022-10-28

## 2022-10-28 RX ORDER — HYDRALAZINE HYDROCHLORIDE 20 MG/ML
2.5-5 INJECTION INTRAMUSCULAR; INTRAVENOUS EVERY 10 MIN PRN
Status: DISCONTINUED | OUTPATIENT
Start: 2022-10-28 | End: 2022-10-28 | Stop reason: HOSPADM

## 2022-10-28 RX ORDER — ONDANSETRON 2 MG/ML
4 INJECTION INTRAMUSCULAR; INTRAVENOUS EVERY 30 MIN PRN
Status: DISCONTINUED | OUTPATIENT
Start: 2022-10-28 | End: 2022-10-28 | Stop reason: HOSPADM

## 2022-10-28 RX ORDER — NALOXONE HYDROCHLORIDE 0.4 MG/ML
0.2 INJECTION, SOLUTION INTRAMUSCULAR; INTRAVENOUS; SUBCUTANEOUS
Status: DISCONTINUED | OUTPATIENT
Start: 2022-10-28 | End: 2022-10-28 | Stop reason: HOSPADM

## 2022-10-28 RX ORDER — PROPOFOL 10 MG/ML
INJECTION, EMULSION INTRAVENOUS CONTINUOUS PRN
Status: DISCONTINUED | OUTPATIENT
Start: 2022-10-28 | End: 2022-10-28

## 2022-10-28 RX ORDER — OXYCODONE HYDROCHLORIDE 5 MG/1
5-10 TABLET ORAL ONCE
Status: DISCONTINUED | OUTPATIENT
Start: 2022-10-28 | End: 2022-10-28 | Stop reason: HOSPADM

## 2022-10-28 RX ORDER — ONDANSETRON 2 MG/ML
INJECTION INTRAMUSCULAR; INTRAVENOUS PRN
Status: DISCONTINUED | OUTPATIENT
Start: 2022-10-28 | End: 2022-10-28

## 2022-10-28 RX ORDER — HYDROMORPHONE HCL IN WATER/PF 6 MG/30 ML
0.4 PATIENT CONTROLLED ANALGESIA SYRINGE INTRAVENOUS EVERY 5 MIN PRN
Status: DISCONTINUED | OUTPATIENT
Start: 2022-10-28 | End: 2022-10-28 | Stop reason: HOSPADM

## 2022-10-28 RX ORDER — SCOLOPAMINE TRANSDERMAL SYSTEM 1 MG/1
1 PATCH, EXTENDED RELEASE TRANSDERMAL ONCE
Status: DISCONTINUED | OUTPATIENT
Start: 2022-10-28 | End: 2022-10-28 | Stop reason: HOSPADM

## 2022-10-28 RX ADMIN — SCOPALAMINE 1 PATCH: 1 PATCH, EXTENDED RELEASE TRANSDERMAL at 09:52

## 2022-10-28 RX ADMIN — DEXMEDETOMIDINE HYDROCHLORIDE 25 MCG: 100 INJECTION, SOLUTION INTRAVENOUS at 10:03

## 2022-10-28 RX ADMIN — SUGAMMADEX 100 MG: 100 INJECTION, SOLUTION INTRAVENOUS at 11:55

## 2022-10-28 RX ADMIN — ROCURONIUM BROMIDE 50 MG: 50 INJECTION, SOLUTION INTRAVENOUS at 10:10

## 2022-10-28 RX ADMIN — IBUPROFEN 600 MG: 600 TABLET ORAL at 13:54

## 2022-10-28 RX ADMIN — PROPOFOL 200 MG: 10 INJECTION, EMULSION INTRAVENOUS at 10:10

## 2022-10-28 RX ADMIN — GLYCOPYRROLATE 0.2 MG: 0.2 INJECTION, SOLUTION INTRAMUSCULAR; INTRAVENOUS at 10:10

## 2022-10-28 RX ADMIN — ACETAMINOPHEN 975 MG: 325 TABLET, FILM COATED ORAL at 08:25

## 2022-10-28 RX ADMIN — SODIUM CHLORIDE, POTASSIUM CHLORIDE, SODIUM LACTATE AND CALCIUM CHLORIDE: 600; 310; 30; 20 INJECTION, SOLUTION INTRAVENOUS at 08:47

## 2022-10-28 RX ADMIN — SODIUM CHLORIDE, POTASSIUM CHLORIDE, SODIUM LACTATE AND CALCIUM CHLORIDE: 600; 310; 30; 20 INJECTION, SOLUTION INTRAVENOUS at 11:55

## 2022-10-28 RX ADMIN — DEXAMETHASONE SODIUM PHOSPHATE 10 MG: 4 INJECTION, SOLUTION INTRA-ARTICULAR; INTRALESIONAL; INTRAMUSCULAR; INTRAVENOUS; SOFT TISSUE at 10:10

## 2022-10-28 RX ADMIN — Medication 2 G: at 09:55

## 2022-10-28 RX ADMIN — FENTANYL CITRATE 100 MCG: 50 INJECTION INTRAVENOUS at 11:52

## 2022-10-28 RX ADMIN — LIDOCAINE HYDROCHLORIDE 0.1 ML: 10 INJECTION, SOLUTION EPIDURAL; INFILTRATION; INTRACAUDAL; PERINEURAL at 08:47

## 2022-10-28 RX ADMIN — PROPOFOL 200 MCG/KG/MIN: 10 INJECTION, EMULSION INTRAVENOUS at 10:15

## 2022-10-28 RX ADMIN — ONDANSETRON 4 MG: 2 INJECTION INTRAMUSCULAR; INTRAVENOUS at 10:10

## 2022-10-28 ASSESSMENT — ACTIVITIES OF DAILY LIVING (ADL)
ADLS_ACUITY_SCORE: 35

## 2022-10-28 NOTE — PROGRESS NOTES
Chief Complaint   Patient presents with     Post-op Visit     Post op sinus surgery 10-28-22     History of Present Illness  Teresa Salinas is a 37 year old female who presents today for follow-up.  The patient went to the operating room on 10/28/2022 for endonasal septoplasty and bilateral endoscopic sinus surgery.  The patient has done well postoperatively with minimal pain.  The patient does report some congestion but no significant nasal bleeding.  They presents today for nasal splint removal.    Past Medical History  Patient Active Problem List   Diagnosis     Allergic rhinitis due to dust mite     Mild intermittent asthma     Family history of thyroid disease     Allergic rhinitis due to pollen     Rh negative state in antepartum period     GBS bacteriuria     GBS (group B Streptococcus carrier), +RV culture, currently pregnant     Infection due to 2019 novel coronavirus      (spontaneous vaginal delivery)     Nexplanon insertion     Current Medications    Current Outpatient Medications:      acetaminophen (TYLENOL) 500 MG tablet, Take 2 tablets (1,000 mg) by mouth every 6 hours as needed for pain or fever Every 6 hours as needed for post-op pain.  Alternate with ibuprofen., Disp: 200 tablet, Rfl: 1     albuterol (PROAIR HFA/PROVENTIL HFA/VENTOLIN HFA) 108 (90 Base) MCG/ACT inhaler, Inhale 2 puffs into the lungs every 4 hours as needed for shortness of breath / dyspnea or wheezing, Disp: 18 g, Rfl: 3     amoxicillin-clavulanate (AUGMENTIN) 875-125 MG tablet, Take 1 tablet by mouth 2 times daily, Disp: 20 tablet, Rfl: 0     cetirizine (ZYRTEC) 10 MG tablet, Take 10 mg by mouth daily , Disp: 30 tablet, Rfl: 1     Cholecalciferol (VITAMIN D) 2000 UNITS tablet, Take 2,000 Units by mouth daily, Disp: 100 tablet, Rfl: 3     etonogestrel (NEXPLANON) 68 MG IMPL, 1 each (68 mg) by Subdermal route once, Disp: , Rfl:      ibuprofen (ADVIL/MOTRIN) 200 MG tablet, Take 3 tablets (600 mg) by mouth every 6 hours as  "needed for moderate pain Every 6 hours as needed for post-op pain.  Alternate with acetaminophen., Disp: 200 tablet, Rfl: 1     Prenatal Vit-Fe Fumarate-FA (PRENATAL MULTIVITAMIN W/IRON) 27-0.8 MG tablet, Take 1 tablet by mouth daily, Disp: , Rfl:      RABEprazole (ACIPHEX) 20 MG EC tablet, Take 1 tablet (20 mg) by mouth daily, Disp: 30 tablet, Rfl: 3    Allergies  Allergies   Allergen Reactions     Contrast Dye Shortness Of Breath and Cough     States \"sneezing\" was the reaction.    Tolerated CT PE study during ED visit on 9/19/22.  Received benadryl prior to CT.  No side effects noted to contrast dye.     Clindamycin Hives       Social History  Social History     Socioeconomic History     Marital status:      Spouse name: Braxton     Number of children: Rosangela     Years of education: 16   Occupational History     Occupation: Registered Nurse     Employer: [employer]     Comment: 5.5 years   Tobacco Use     Smoking status: Never     Smokeless tobacco: Never   Vaping Use     Vaping Use: Never used   Substance and Sexual Activity     Alcohol use: No     Alcohol/week: 0.0 standard drinks     Drug use: No     Sexual activity: Not Currently     Partners: Male   Other Topics Concern     Parent/sibling w/ CABG, MI or angioplasty before 65F 55M? No   Social History Narrative    November 26, 2018            ENVIRONMENTAL HISTORY: The [place] The home is heated with a forced air and gas furnace. They do have central air conditioning. The patient's bedroom is furnished with feather/wool bedding or pillows and carpeting in bedroom.  Pets inside the house include 1 dog(s). There is no history of cockroach or mice infestation. There is/are 0 smokers in the house.  The house does not have a damp basement.        Family History  Family History   Problem Relation Age of Onset     Hyperlipidemia Mother      Thyroid Disease Mother      " "Seasonal/Environmental Allergies Mother      Diabetes Father         typeII     Hypertension Father      Hyperlipidemia Father      Depression Father      Seasonal/Environmental Allergies Father      Asthma Sister      Seasonal/Environmental Allergies Sister      Other - See Comments Sister         May-Thurner syndrome     Thyroid Disease Sister      Hyperlipidemia Maternal Grandmother      Colon Cancer Maternal Grandfather      Chronic Obstructive Pulmonary Disease Paternal Grandmother      Esophageal Cancer Paternal Grandfather        Review of Systems  As per HPI and PMHx, otherwise 10 system review including the head and neck, constitutional, eyes, respiratory, GI, skin, neurologic, lymphatic, endocrine, and allergy systems is negative.    Physical Exam  /67 (BP Location: Right arm, Patient Position: Sitting, Cuff Size: Adult Regular)   Pulse 65   Temp 98.7  F (37.1  C) (Tympanic)   Ht 1.651 m (5' 5\")   LMP 11/13/2021 (Approximate)   BMI 28.47 kg/m    GENERAL: Patient is a pleasant, cooperative 37 year old female in no acute distress.  HEAD: Normocephalic, atraumatic.  Hair and scalp are normal.  EYES: Pupils are equal, round, reactive to light and accommodation.  Extraocular movements are intact.  The sclera nonicteric without injection.  The extraocular structures are normal.  EARS: Normal shape and symmetry.  No tenderness when palpating the mastoid or tragal areas bilaterally.    NOSE: Segura splints are in good placement.  The stitch and Segura splints are removed.  The nasal septum is midline without any evidence of septal hematoma.  The inferior turbinates are well lateralized.  The bilateral nasal cavities were suctioned free.  The patient noticed immediate improvement in the breathing.  The patient tolerated the procedure well.  NEUROLOGIC: Cranial nerves II through XII are grossly intact.  Voice is strong.  Patient is House-Brackman I/VI bilaterally.  CARDIOVASCULAR: Extremities are warm and " well-perfused.  No significant peripheral edema.  RESPIRATORY: Patient has nonlabored breathing without cough, wheeze, stridor.  PSYCHIATRIC: Patient is alert and oriented.  Mood and affect appear normal.  SKIN: Warm and dry.  No scalp, face, or neck lesions noted.    Assessment and Plan     ICD-10-CM    1. Chronic maxillary sinusitis  J32.0       2. Chronic anterior ethmoidal sinusitis  J32.2       3. History of acute sinusitis  Z87.09       4. Nasal obstruction  J34.89       5. Deviated nasal septum  J34.2       6. Hypertrophy of both inferior nasal turbinates  J34.3       7. History of allergic rhinitis  Z87.09       8. Status post functional endoscopic sinus surgery  Z98.890       9. Status post nasal septoplasty  Z98.890       10. Postoperative state  Z98.890          It was my pleasure seeing Teresa Salinas today in clinic.  The patient is healing well after their septoplasty and endoscopic sinus surgery.  We discussed an additional 1 week of nose blowing and activity restrictions.  We will have them start nasal saline irrigation.  They can return to use of nasal sprays.  I would like to see the patient back in 2 to 3 weeks for recheck.  Patient knows to contact me sooner with any problems or concerns.    Miguel Angel Marcano MD  Department of Otolaryngology-Head and Neck Surgery  [facility]

## 2022-10-28 NOTE — ANESTHESIA CARE TRANSFER NOTE
Patient: Teresa Salinas    Procedure: Procedure(s):  FUNCTIONAL ENDOSCOPIC SINUS SURGERY (FESS)  SEPTOPLASTY, NOSE, WITH TURBINOPLASTY       Diagnosis: Chronic maxillary sinusitis [J32.0]  Chronic anterior ethmoidal sinusitis [J32.2]  History of acute sinusitis [Z87.09]  Nasal obstruction [J34.89]  Deviated nasal septum [J34.2]  Hypertrophy of both inferior nasal turbinates [J34.3]  History of allergic rhinitis [Z87.09]  Diagnosis Additional Information: No value filed.    Anesthesia Type:   General     Note:    Oropharynx: oral airway in place and spontaneously breathing  Level of Consciousness: unresponsive  Oxygen Supplementation: face mask  Level of Supplemental Oxygen (L/min / FiO2): 10  Independent Airway: airway patency satisfactory and stable    Vital Signs Stable: post-procedure vital signs reviewed and stable  Report to RN Given: handoff report given  Patient transferred to: PACU    Handoff Report: Identifed the Patient, Identified the Reponsible Provider, Reviewed the pertinent medical history, Discussed the surgical course, Reviewed Intra-OP anesthesia mangement and issues during anesthesia, Set expectations for post-procedure period and Allowed opportunity for questions and acknowledgement of understanding      Vitals:  Vitals Value Taken Time   BP     Temp     Pulse     Resp     SpO2         Electronically Signed By: BETTINA Polanco CRNA  October 28, 2022  12:19 PM

## 2022-10-28 NOTE — DISCHARGE INSTRUCTIONS
Postoperative Instructions Following Nose and Sinus Surgery    Recovery - Everyone recovers differently from a general anesthetic.  Symptoms such as fatigue, nausea, light-headedness, and sometimes a low grade fever (up to 101 degrees) are not unusual.  As your body removes the anesthetic drugs from circulation, these symptoms will resolve.  Your nose will be sore after surgery, and you may even have symptoms similar to a sinus infection with headache, congestion, and pressure.  These symptoms are normal and will resolve with healing.  For a few days you may experience bloody drainage from the nose, please use the drip pad as necessary for this.  If you are soaking a drip pad more frequently than once every 20 minutes, please call the office during business hours or the on call ENT physician after hours.  There are no diet restrictions after sinus surgery, and you can resume your home medications.  Please do not blow your nose for two weeks after surgery. You may wipe your nose gently. Limit your activity to no strenuous activities or exercise until I see you for the first follow-up visit.      Medications - You were sent home with narcotic pain medication.  If you can tolerate the discomfort during your recovery by using just plain Tylenol, please do so.  However, do not hesitate to use the stronger pain medication if needed.  If you were sent home with an antibiotic, it is primarily used to help the healing process.  If it causes loose bowel movements or other signs of intolerance, it is appropriate to discontinue it.      Nasal Irrigations: By far the most important measure you can take to speed recovery, and maximize the chances of long term success of sinus surgery is using the sinus rinses at least two times per day for the first month after surgery.  There may be small plastic pieces placed inside your nose during surgery (splints). These help to promote the septum into healing in its straightened position,  and will be removed at the follow up visit.  The splints might make nasal irrigations difficult.  If you are unable to rinse with the splints in place, we will resume irrigations following splint removal at your first follow up visit.  You should rinse if able starting the day following surgery.    Complications - Problems related to sinus surgery almost always are detected during the operation, and special instructions will be given in that situation.  However, unexpected things can happen, and are all related to the structures around the sinus cavities.  Symptoms that should alert you to a possible problem include: severe eye pain or eye swelling, persistent heavy bleeding from the nose, and high fevers with headache and neck pain.  Any of these symptoms should be called into my office or to the on call ENT if after hours.  The most common non-emergency complication of sinus surgery is the formation of scar tissue which can re-block the sinuses.  This is addressed below.      Follow up - Splints will be removed at the 1 week follow up visit. This is simple and takes just a few seconds afterwards, the improvement you will expereince in breathing from the septoplasty is usually dramatic and immediate.  I will then see you around 2 weeks after surgery to clean out your nose.  This is done to aggressively manage the most common complication of this procedure, which is scar tissue blocking the sinuses.  These visits will require the examination of your nose and possibly removal of crusts of dry mucous and blood, with possible removal of early scar tissue.  Please prepare for these visits by using your sinus rinses.    If there are any questions or issues with the above, or if there are other issues that concern you, always feel free to call the clinic and I am happy to speak with you as soon as feasible.    Miguel Angel Marcano MD  Department of Otolaryngology-Head and Neck Surgery  [facility]  [phone] or  [phone] After hours, [facility] option                      Same Day Surgery Discharge Instructions  Special Precautions After Surgery - Adult    It is not unusual to feel lightheaded or faint, up to 24 hours after surgery or while taking pain medication.  If you have these symptoms; sit for a few minutes before standing and have someone assist you when getting up.  You should rest and relax for the next 24 hours and must have someone stay with you for at least 24 hours after your discharge.  DO NOT DRIVE any vehicle or operate mechanical equipment for 24 hours following the end of your surgery.  DO NOT DRIVE while taking narcotic pain medications that have been prescribed by your physician.  If you had a limb operated on, you must be able to use it fully to drive.  DO NOT drink alcoholic beverages for 24 hours following surgery or while taking prescription pain medication.  Drink clear liquids (apple juice, ginger ale, broth, 7-Up, etc.).  Progress to your regular diet as you feel able.  Any questions call your physician and do not make important decisions for 24 hours.      __________________________________________________________________________________________________________________________________  IMPORTANT NUMBERS:    [facility] Main Number:  [phone], [phone]  Pharmacy:  [phone]  Same Day Surgery:  [phone], Monday - Friday until 8:30 p.m.  Urgent Care:  [phone]  Emergency Room:  [phone]      [facility]:  [phone]                                                                             [facility]:  [phone] option 1  [facility]:  [phone]     [facility] Clinic:  [phone]   [facility]ty Clinic:  [phone]   Home Medical Equipment: [phone]  [facility]:  [phone]

## 2022-10-28 NOTE — ANESTHESIA POSTPROCEDURE EVALUATION
Patient: Teresa Salinas    Procedure: Procedure(s):  FUNCTIONAL ENDOSCOPIC SINUS SURGERY (FESS)  SEPTOPLASTY, NOSE, WITH TURBINOPLASTY       Anesthesia Type:  General    Note:  Disposition: Outpatient   Postop Pain Control: Uneventful            Sign Out: Well controlled pain   PONV: No   Neuro/Psych: Uneventful            Sign Out: Acceptable/Baseline neuro status   Airway/Respiratory: Uneventful            Sign Out: Acceptable/Baseline resp. status   CV/Hemodynamics: Uneventful            Sign Out: Acceptable CV status; No obvious hypovolemia; No obvious fluid overload   Other NRE: NONE   DID A NON-ROUTINE EVENT OCCUR? No           Last vitals:  Vitals Value Taken Time   /65 10/28/22 1245   Temp 36.4  C (97.6  F) 10/28/22 1225   Pulse 48 10/28/22 1246   Resp 15 10/28/22 1246   SpO2 100 % 10/28/22 1246   Vitals shown include unvalidated device data.    Electronically Signed By: BETTINA Polanco CRNA  October 28, 2022  12:48 PM

## 2022-10-28 NOTE — OP NOTE
PREOPERATIVE DIAGNOSES: Chronic maxillary and ethmoid sinusitis, recurrent acute sinusitis, nasal obstruction, nasal septal deviation, inferior turbinate hypertrophy.     POSTOPERATIVE DIAGNOSES: Same.     PROCEDURE PERFORMED:   1. Endonasal septoplasty   2. Bilateral submucous resection of the inferior turbinates  3. Bilateral endoscopic maxillary antrostomies with tissue removal  4. Bilateral endoscopic anterior ethmoidectomies    SURGEON: Miguel Angel Marcano MD      ASSISTANTS: None.     BLOOD LOSS: 50 mL.     COMPLICATIONS: None.      SPECIMENS: None.     ANESTHESIA: General.     GRAFTS, IMPLANTS, DRAINS: None.     INDICATIONS: Improve symptoms.      FINDINGS:   1.  Chronically inflamed sinonasal mucosa with polypoid degeneration of the maxillary and ethmoid sinuses  2.  Thick inspissated secretions in the right maxillary sinus  3.  Leftward anterior and mid nasal septal deviation  4.  Severe/marked inferior turban hypertrophy       OPERATIVE TECHNIQUE: The patient was brought to the operating room and identified by name clinic number.  They were placed supinely on the operating room table and general endotracheal anesthesia was induced by the anesthesia service.  The patient was prepped and draped in standard fashion.  Examination of the nose revealed leftward anterior and mid nasal septal deviation.  Afrin-soaked pledgets were placed in the nasal cavities bilaterally.  The nose was injected with 1% lidocaine 1:100,000 epinephrine.  After standard surgical pause, a left saad-transfixion incision was made.  A mucoperichondrial flap was elevated in the avascular plane posteriorly to the bony cartilaginous junction.  A chondrotomy was made with a 15 blade leaving a wide 1.5 cm dorsal and caudal strut.  A mucoperichondrial flap was elevated on the contralateral side.  Deviated portions of cartilage and bone were removed.  The septal flaps were redraped.  The septum was midline and the choana were visible bilaterally  with anterior rhinoscopy. No tears were noted in the nasal septal flaps bilaterally.      Next, the 0 degree endoscope was used to visualize the right nasal cavity.  Using the maxillary sinus seeker, the uncinate was brought forward.  The uncinate was then taken up to its origin.  Using through cutting instrumentation, 12 degree 4 mm shaver, and the 30 degree endoscope, the maxillary sinus opening was taken to the posterior limit of the maxillary sinus.  A wide maxillary antrostomy was fashioned respecting the orbit and inferior turbinate.  Polypoid tissue and thick inspissated secretions were removed from the sinus using curved sinus instrumentation and the shaver. Next, the ethmoid bulla was entered inferomedially.  The ethmoid cell partitions were divided using through-cutting instrumentation.  Dissection was taken to the ground lamella. The anterior ethmoidectomy was completed in a posterior to anterior fashion.  Care was taken to respect the orbit and skull base.      Next, attention was turned to the left. Using the maxillary sinus seeker, the uncinate was brought forward.  The uncinate was then taken up to its origin.  Using through cutting instrumentation, 12 degree 4 mm shaver, and the 30 degree endoscope, the maxillary sinus opening was taken to the posterior limit of the maxillary sinus.  A wide maxillary antrostomy was fashioned respecting the orbit and inferior turbinate. Polypoid tissue and thick inspissated secretions were removed from the sinus using curved sinus instrumentation and the shaver. Next, the ethmoid bulla was entered inferomedially.  The ethmoid cell partitions were divided using through-cutting instrumentation.  Dissection was taken to the ground lamella. The anterior ethmoidectomy was completed in a posterior to anterior fashion.  Care was taken to respect the orbit and skull base.     Next, attention was turned to the inferior turbinates.  Bilateral nasal cavities were examined using  the 0 degree 4 mm endoscope.  There was obvious bilateral inferior turbinate hypertrophy.  The turbinates were injected with 1% lidocaine with 1:100,000 epinephrine.  Attention was turned the right.  A 5 mm stab incision was made in the head of the inferior turbinate.  Using the 2.9 mm turbinate blade, submucous resection of the inferior turbinate was performed by dissecting of the bone and removing the submucosa with a shaver.  The inferior turbinate was then outfractured with a Winchester and the Thomaston elevator.  Attention was turned to the left.  A 5 mm stab incision was made in the head of the inferior turbinate.  Using the 2.9 mm turbinate blade, submucous resection of the inferior turbinate was performed by dissecting of the bone and removing the submucosa with a shaver. The inferior turbinate was then outfractured with a Winchester and the Thomaston elevator.  The stab incisions were left to heal by secondary intention.    The bilateral nasal cavities were irrigated and suctioned.  Hemostasis was assured.  PosiSep X was placed in the middle meatal areas bilaterally to medialize the middle turbinates and assist with hemostasis bilaterally.       The nasoseptal space was irrigated and suctioned.  Hemostasis was assured.  The hemitransfixion incision was closed with multiple interrupted 5-0 chromic sutures.  Segura splints were placed bilaterally and secured with a 3-0 nylon mattress suture.  The stomach was suctioned.    This marked the end of the procedure.  The patient was then turned over to anesthesia for recovery where they were awakened, extubated, and transferred to the PACU in excellent condition.  There were no complications.  There was minimal blood loss.  All standard operating protocol universal precautions were used throughout the procedure.     Miguel Angel Marcano MD  Department of Otolaryngology-Head and Neck Surgery  [facility]

## 2022-11-03 ENCOUNTER — OFFICE VISIT (OUTPATIENT)
Dept: OTOLARYNGOLOGY | Facility: CLINIC | Age: 37
End: 2022-11-03
Payer: COMMERCIAL

## 2022-11-03 VITALS
HEIGHT: 65 IN | SYSTOLIC BLOOD PRESSURE: 102 MMHG | TEMPERATURE: 98.7 F | BODY MASS INDEX: 28.47 KG/M2 | DIASTOLIC BLOOD PRESSURE: 67 MMHG | HEART RATE: 65 BPM

## 2022-11-03 DIAGNOSIS — Z87.09 HISTORY OF ACUTE SINUSITIS: ICD-10-CM

## 2022-11-03 DIAGNOSIS — J32.0 CHRONIC MAXILLARY SINUSITIS: Primary | ICD-10-CM

## 2022-11-03 DIAGNOSIS — J32.2 CHRONIC ANTERIOR ETHMOIDAL SINUSITIS: ICD-10-CM

## 2022-11-03 DIAGNOSIS — J34.3 HYPERTROPHY OF BOTH INFERIOR NASAL TURBINATES: ICD-10-CM

## 2022-11-03 DIAGNOSIS — Z98.890 STATUS POST NASAL SEPTOPLASTY: ICD-10-CM

## 2022-11-03 DIAGNOSIS — Z98.890 POSTOPERATIVE STATE: ICD-10-CM

## 2022-11-03 DIAGNOSIS — J34.2 DEVIATED NASAL SEPTUM: ICD-10-CM

## 2022-11-03 DIAGNOSIS — J34.89 NASAL OBSTRUCTION: ICD-10-CM

## 2022-11-03 DIAGNOSIS — Z98.890 STATUS POST FUNCTIONAL ENDOSCOPIC SINUS SURGERY: ICD-10-CM

## 2022-11-03 DIAGNOSIS — Z87.09 HISTORY OF ALLERGIC RHINITIS: ICD-10-CM

## 2022-11-03 PROCEDURE — 99024 POSTOP FOLLOW-UP VISIT: CPT | Performed by: OTOLARYNGOLOGY

## 2022-11-03 NOTE — NURSING NOTE
"Initial /67 (BP Location: Right arm, Patient Position: Sitting, Cuff Size: Adult Regular)   Pulse 65   Temp 98.7  F (37.1  C) (Tympanic)   Ht 1.651 m (5' 5\")   LMP 11/13/2021 (Approximate)   BMI 28.47 kg/m   Estimated body mass index is 28.47 kg/m  as calculated from the following:    Height as of this encounter: 1.651 m (5' 5\").    Weight as of 10/28/22: 77.6 kg (171 lb 1.2 oz). .    Anais Burns CMA    "

## 2022-11-03 NOTE — PATIENT INSTRUCTIONS
Per physician instructions      If you have questions or concerns on any instructions given to you by your provider today or if you need to schedule an appointment, you can reach us at [phone].     1) Limit nose blowing for an additional 1 week.  2) Limit heavy lifting/straining for an additional 1 week.  3) Restart topical nasal sprays.    NASAL SALINE IRRIGATION INSTRUCTIONS    You will be starting nasal saline irrigations and will need to obtain the following:      - NeilMed Sinus Rinse 8 oz Kit  - Distilled or filtered water   - Normal saline salt packets    Place filtered or distilled water into the NeilMed bottle up to the fill line (DO NOT USE TAP OR WELL WATER).  Place the pre-made salt packet in the 8 oz of saline.  Shake the bottle to suspend into solution.  Lean head forward over a sink or a basin.  Rinse each side of the nose with one-half of the bottle (each squeeze is about one-half of the bottle). Rinse the nose daily.     If you use topical nasal sprays, apply following irrigation.    Video example: https://[email or web address]/[email or web address]?[email or web address]

## 2022-11-03 NOTE — LETTER
11/3/2022         RE: Teresa Salinas  [id number] [facility] [address]        Dear Colleague,    Thank you for referring your patient, Teresa Salinas, to the [facility]. Please see a copy of my visit note below.    Chief Complaint   Patient presents with     Post-op Visit     Post op sinus surgery 10-28-22     History of Present Illness  Teresa Salinas is a 37 year old female who presents today for follow-up.  The patient went to the operating room on 10/28/2022 for endonasal septoplasty and bilateral endoscopic sinus surgery.  The patient has done well postoperatively with minimal pain.  The patient does report some congestion but no significant nasal bleeding.  They presents today for nasal splint removal.    Past Medical History  Patient Active Problem List   Diagnosis     Allergic rhinitis due to dust mite     Mild intermittent asthma     Family history of thyroid disease     Allergic rhinitis due to pollen     Rh negative state in antepartum period     GBS bacteriuria     GBS (group B Streptococcus carrier), +RV culture, currently pregnant     Infection due to 2019 novel coronavirus      (spontaneous vaginal delivery)     Nexplanon insertion     Current Medications    Current Outpatient Medications:      acetaminophen (TYLENOL) 500 MG tablet, Take 2 tablets (1,000 mg) by mouth every 6 hours as needed for pain or fever Every 6 hours as needed for post-op pain.  Alternate with ibuprofen., Disp: 200 tablet, Rfl: 1     albuterol (PROAIR HFA/PROVENTIL HFA/VENTOLIN HFA) 108 (90 Base) MCG/ACT inhaler, Inhale 2 puffs into the lungs every 4 hours as needed for shortness of breath / dyspnea or wheezing, Disp: 18 g, Rfl: 3     amoxicillin-clavulanate (AUGMENTIN) 875-125 MG tablet, Take 1 tablet by mouth 2 times daily, Disp: 20 tablet, Rfl: 0     cetirizine (ZYRTEC) 10 MG tablet, Take 10 mg by mouth daily , Disp: 30 tablet, Rfl: 1     Cholecalciferol (VITAMIN D) 2000 UNITS  "tablet, Take 2,000 Units by mouth daily, Disp: 100 tablet, Rfl: 3     etonogestrel (NEXPLANON) 68 MG IMPL, 1 each (68 mg) by Subdermal route once, Disp: , Rfl:      ibuprofen (ADVIL/MOTRIN) 200 MG tablet, Take 3 tablets (600 mg) by mouth every 6 hours as needed for moderate pain Every 6 hours as needed for post-op pain.  Alternate with acetaminophen., Disp: 200 tablet, Rfl: 1     Prenatal Vit-Fe Fumarate-FA (PRENATAL MULTIVITAMIN W/IRON) 27-0.8 MG tablet, Take 1 tablet by mouth daily, Disp: , Rfl:      RABEprazole (ACIPHEX) 20 MG EC tablet, Take 1 tablet (20 mg) by mouth daily, Disp: 30 tablet, Rfl: 3    Allergies  Allergies   Allergen Reactions     Contrast Dye Shortness Of Breath and Cough     States \"sneezing\" was the reaction.    Tolerated CT PE study during ED visit on 9/19/22.  Received benadryl prior to CT.  No side effects noted to contrast dye.     Clindamycin Hives       Social History  Social History     Socioeconomic History     Marital status:      Spouse name: Braxton     Number of children: 1     Years of education: 16   Occupational History     Occupation: Registered Nurse     Employer: [employer]     Comment: 5.5 years   Tobacco Use     Smoking status: Never     Smokeless tobacco: Never   Vaping Use     Vaping Use: Never used   Substance and Sexual Activity     Alcohol use: No     Alcohol/week: 0.0 standard drinks     Drug use: No     Sexual activity: Not Currently     Partners: Male   Other Topics Concern     Parent/sibling w/ CABG, MI or angioplasty before 65F 55M? No   Social History Narrative    November 26, 2018            ENVIRONMENTAL HISTORY: [place] The home is heated with a forced air and gas furnace. They do have central air conditioning. The patient's bedroom is furnished with feather/wool bedding or pillows and carpeting in bedroom.  Pets inside the house include 1 dog(s). There is no history of " "cockroach or mice infestation. There is/are 0 smokers in the house.  The house does not have a damp basement.        Family History  Family History   Problem Relation Age of Onset     Hyperlipidemia Mother      Thyroid Disease Mother      Seasonal/Environmental Allergies Mother      Diabetes Father         typeII     Hypertension Father      Hyperlipidemia Father      Depression Father      Seasonal/Environmental Allergies Father      Asthma Sister      Seasonal/Environmental Allergies Sister      Other - See Comments Sister         May-Thurner syndrome     Thyroid Disease Sister      Hyperlipidemia Maternal Grandmother      Colon Cancer Maternal Grandfather      Chronic Obstructive Pulmonary Disease Paternal Grandmother      Esophageal Cancer Paternal Grandfather        Review of Systems  As per HPI and PMHx, otherwise 10 system review including the head and neck, constitutional, eyes, respiratory, GI, skin, neurologic, lymphatic, endocrine, and allergy systems is negative.    Physical Exam  /67 (BP Location: Right arm, Patient Position: Sitting, Cuff Size: Adult Regular)   Pulse 65   Temp 98.7  F (37.1  C) (Tympanic)   Ht 1.651 m (5' 5\")   LMP 11/13/2021 (Approximate)   BMI 28.47 kg/m    GENERAL: Patient is a pleasant, cooperative 37 year old female in no acute distress.  HEAD: Normocephalic, atraumatic.  Hair and scalp are normal.  EYES: Pupils are equal, round, reactive to light and accommodation.  Extraocular movements are intact.  The sclera nonicteric without injection.  The extraocular structures are normal.  EARS: Normal shape and symmetry.  No tenderness when palpating the mastoid or tragal areas bilaterally.    NOSE: Segura splints are in good placement.  The stitch and Segura splints are removed.  The nasal septum is midline without any evidence of septal hematoma.  The inferior turbinates are well lateralized.  The bilateral nasal cavities were suctioned free.  The patient noticed immediate " improvement in the breathing.  The patient tolerated the procedure well.  NEUROLOGIC: Cranial nerves II through XII are grossly intact.  Voice is strong.  Patient is House-Brackman I/VI bilaterally.  CARDIOVASCULAR: Extremities are warm and well-perfused.  No significant peripheral edema.  RESPIRATORY: Patient has nonlabored breathing without cough, wheeze, stridor.  PSYCHIATRIC: Patient is alert and oriented.  Mood and affect appear normal.  SKIN: Warm and dry.  No scalp, face, or neck lesions noted.    Assessment and Plan     ICD-10-CM    1. Chronic maxillary sinusitis  J32.0       2. Chronic anterior ethmoidal sinusitis  J32.2       3. History of acute sinusitis  Z87.09       4. Nasal obstruction  J34.89       5. Deviated nasal septum  J34.2       6. Hypertrophy of both inferior nasal turbinates  J34.3       7. History of allergic rhinitis  Z87.09       8. Status post functional endoscopic sinus surgery  Z98.890       9. Status post nasal septoplasty  Z98.890       10. Postoperative state  Z98.890          It was my pleasure seeing Teresa Salinas today in clinic.  The patient is healing well after their septoplasty and endoscopic sinus surgery.  We discussed an additional 1 week of nose blowing and activity restrictions.  We will have them start nasal saline irrigation.  They can return to use of nasal sprays.  I would like to see the patient back in 2 to 3 weeks for recheck.  Patient knows to contact me sooner with any problems or concerns.    Miguel Angel Marcano MD  Department of Otolaryngology-Head and Neck Surgery  [facility]         Again, thank you for allowing me to participate in the care of your patient.        Sincerely,        Miguel Angel Marcano MD

## 2022-11-16 ENCOUNTER — MEDICAL CORRESPONDENCE (OUTPATIENT)
Dept: HEALTH INFORMATION MANAGEMENT | Facility: CLINIC | Age: 37
End: 2022-11-16

## 2022-11-18 NOTE — PROGRESS NOTES
Chief Complaint   Patient presents with     Post-op Visit     4 week PO FESS/Septum     History of Present Illness  eTresa Salinas is a 37 year old female who presents today for follow-up.  The patient went to the operating room on 10/28/2022 for endonasal septoplasty and bilateral endoscopic sinus surgery.  The patient was seen for follow-up on 11/3/2022 for nasal splint removal and she was doing well.  She returns today for follow-up.      From a symptom standpoint, the patient reports that she recently developed a cold/upper respiratory illness.  Despite this, her nose has stayed open and she has been able to effectively rinse in the nose has been draining very well.  She is had minimal sinus symptoms.  The patient has otherwise done well postoperatively with minimal pain.  The patient does report some congestion but no significant nasal bleeding.  They presents today for nasal exam and debridement.      Past Medical History  Patient Active Problem List   Diagnosis     Allergic rhinitis due to dust mite     Mild intermittent asthma     Family history of thyroid disease     Allergic rhinitis due to pollen     Rh negative state in antepartum period     GBS bacteriuria     GBS (group B Streptococcus carrier), +RV culture, currently pregnant     Infection due to 2019 novel coronavirus      (spontaneous vaginal delivery)     Nexplanon insertion     Current Medications    Current Outpatient Medications:      cetirizine (ZYRTEC) 10 MG tablet, Take 10 mg by mouth daily , Disp: 30 tablet, Rfl: 1     Cholecalciferol (VITAMIN D) 2000 UNITS tablet, Take 2,000 Units by mouth daily, Disp: 100 tablet, Rfl: 3     etonogestrel (NEXPLANON) 68 MG IMPL, 1 each (68 mg) by Subdermal route once, Disp: , Rfl:      Prenatal Vit-Fe Fumarate-FA (PRENATAL MULTIVITAMIN W/IRON) 27-0.8 MG tablet, Take 1 tablet by mouth daily, Disp: , Rfl:      albuterol (PROAIR HFA/PROVENTIL HFA/VENTOLIN HFA) 108 (90 Base) MCG/ACT inhaler, Inhale 2  "puffs into the lungs every 4 hours as needed for shortness of breath / dyspnea or wheezing (Patient not taking: Reported on 11/23/2022), Disp: 18 g, Rfl: 3     RABEprazole (ACIPHEX) 20 MG EC tablet, Take 1 tablet (20 mg) by mouth daily (Patient not taking: Reported on 11/23/2022), Disp: 30 tablet, Rfl: 3    Allergies  Allergies   Allergen Reactions     Contrast Dye Shortness Of Breath and Cough     States \"sneezing\" was the reaction.    Tolerated CT PE study during ED visit on 9/19/22.  Received benadryl prior to CT.  No side effects noted to contrast dye.     Clindamycin Hives       Social History  Social History     Socioeconomic History     Marital status:      Spouse name: Braxton     Number of children: 1     Years of education: 16   Occupational History     Occupation: Registered Nurse     Employer: [employer]     Comment: 5.5 years   Tobacco Use     Smoking status: Never     Smokeless tobacco: Never   Vaping Use     Vaping Use: Never used   Substance and Sexual Activity     Alcohol use: No     Alcohol/week: 0.0 standard drinks     Drug use: No     Sexual activity: Not Currently     Partners: Male   Other Topics Concern     Parent/sibling w/ CABG, MI or angioplasty before 65F 55M? No   Social History Narrative    November 26, 2018            ENVIRONMENTAL HISTORY: The family [place] The home is heated with a forced air and gas furnace. They do have central air conditioning. The patient's bedroom is furnished with feather/wool bedding or pillows and carpeting in bedroom.  Pets inside the house include 1 dog(s). There is no history of cockroach or mice infestation. There is/are 0 smokers in the house.  The house does not have a damp basement.        Family History  Family History   Problem Relation Age of Onset     Hyperlipidemia Mother      Thyroid Disease Mother      Seasonal/Environmental Allergies Mother      Diabetes Father      " "   typeII     Hypertension Father      Hyperlipidemia Father      Depression Father      Seasonal/Environmental Allergies Father      Asthma Sister      Seasonal/Environmental Allergies Sister      Other - See Comments Sister         May-Thurner syndrome     Thyroid Disease Sister      Hyperlipidemia Maternal Grandmother      Colon Cancer Maternal Grandfather      Chronic Obstructive Pulmonary Disease Paternal Grandmother      Esophageal Cancer Paternal Grandfather        Review of Systems  As per HPI and PMHx, otherwise 10 system review including the head and neck, constitutional, eyes, respiratory, GI, skin, neurologic, lymphatic, endocrine, and allergy systems is negative.    Physical Exam  /68 (BP Location: Left arm, Patient Position: Sitting, Cuff Size: Adult Regular)   Pulse 74   Ht 1.651 m (5' 5\")   Wt 77.1 kg (170 lb)   LMP 11/13/2021 (Approximate)   SpO2 98%   BMI 28.29 kg/m    GENERAL: Patient is a pleasant, cooperative 37 year old female in no acute distress.  HEAD: Normocephalic, atraumatic.  Hair and scalp are normal.  EYES: Pupils are equal, round, reactive to light and accommodation.  Extraocular movements are intact.  The sclera nonicteric without injection.  The extraocular structures are normal.  EARS: Normal shape and symmetry.  No tenderness when palpating the mastoid or tragal areas bilaterally.    NOSE: Nares are patent.  Nasal mucosa is pink and moist.  Negative anterior rhinoscopy.    NEUROLOGIC: Cranial nerves II through XII are grossly intact.  Voice is strong.  Patient is House-Brackman I/VI bilaterally.  CARDIOVASCULAR: Extremities are warm and well-perfused.  No significant peripheral edema.  RESPIRATORY: Patient has nonlabored breathing without cough, wheeze, stridor.  PSYCHIATRIC: Patient is alert and oriented.  Mood and affect appear normal.  SKIN: Warm and dry.  No scalp, face, or neck lesions noted.    Procedure: Nasal sinus debridement   Indication: Status post " endoscopic sinus surgery    To improve medication delivery and assist with healing following sinus surgery, the nasal cavities were examined and debrided.  I proceeded with rigid nasal endoscopic examination and debridement.  The bilateral nasal cavities were anesthetized with 4% lidocaine and phenylephrine spray.  The bilateral nasal cavities were visualized with flexible fiberoptic AMBU intervention endoscope.  The right nasal cavity was debrided suction.  The patient had a widely patent maxillary antrostomy and ethmoid cavity.  There was a slight amount of mucopurulent drainage.  The sphenoethmoid recess was clear.  The frontal sinus outflow area was clear.  The middle turbinate was well medialized without adhesion.  Attention was turned to the left.  The left nasal cavity was debrided suction.  The patient had a widely patent maxillary antrostomy and ethmoid cavity.  There was a slight amount of mucopurulent drainage.  The sphenoethmoid recess was clear.  The frontal sinus outflow area was clear.  The middle turbinate was well medialized without adhesion.  The scope was removed.  The patient tolerated the procedure well.    Assessment and Plan     ICD-10-CM    1. Chronic maxillary sinusitis  J32.0 NASAL/SINUS SCOPE WITH BIOPSY/POLYPECT/DEBRIDE,ENT      2. Chronic anterior ethmoidal sinusitis  J32.2 NASAL/SINUS SCOPE WITH BIOPSY/POLYPECT/DEBRIDE,ENT      3. History of acute sinusitis  Z87.09 NASAL/SINUS SCOPE WITH BIOPSY/POLYPECT/DEBRIDE,ENT      4. Nasal obstruction  J34.89 NASAL/SINUS SCOPE WITH BIOPSY/POLYPECT/DEBRIDE,ENT      5. Deviated nasal septum  J34.2 NASAL/SINUS SCOPE WITH BIOPSY/POLYPECT/DEBRIDE,ENT      6. Hypertrophy of both inferior nasal turbinates  J34.3 NASAL/SINUS SCOPE WITH BIOPSY/POLYPECT/DEBRIDE,ENT      7. History of allergic rhinitis  Z87.09 NASAL/SINUS SCOPE WITH BIOPSY/POLYPECT/DEBRIDE,ENT      8. Status post functional endoscopic sinus surgery  Z98.890 NASAL/SINUS SCOPE WITH  BIOPSY/POLYPECT/DEBRIDE,ENT      9. Status post nasal septoplasty  Z98.890 NASAL/SINUS SCOPE WITH BIOPSY/POLYPECT/DEBRIDE,ENT      10. Postoperative state  Z98.890 NASAL/SINUS SCOPE WITH BIOPSY/POLYPECT/DEBRIDE,ENT         It was my pleasure seeing Teresa Salinas today in clinic.  The patient is healing well after their septoplasty and endoscopic sinus surgery.  We discussed returning to normal activity and nose blowing.  I would have her use her irrigations while she is ill.  We discussed adding some Afrin for a few days to help clear some of the secretions.  Once she convalesces from her current upper respiratory illness, she can do the nasal irrigations as needed and return to any nasal sprays.  I would like to see the patient back in 2 to 3 months for recheck.  Patient knows to contact me sooner with any problems or concerns.    Miguel Angel Marcano MD  Department of Otolaryngology-Head and Neck Surgery  [facility]

## 2022-11-23 ENCOUNTER — OFFICE VISIT (OUTPATIENT)
Dept: OTOLARYNGOLOGY | Facility: CLINIC | Age: 37
End: 2022-11-23
Payer: COMMERCIAL

## 2022-11-23 VITALS
BODY MASS INDEX: 28.32 KG/M2 | OXYGEN SATURATION: 98 % | HEART RATE: 74 BPM | SYSTOLIC BLOOD PRESSURE: 104 MMHG | WEIGHT: 170 LBS | DIASTOLIC BLOOD PRESSURE: 68 MMHG | HEIGHT: 65 IN

## 2022-11-23 DIAGNOSIS — Z98.890 STATUS POST FUNCTIONAL ENDOSCOPIC SINUS SURGERY: ICD-10-CM

## 2022-11-23 DIAGNOSIS — Z87.09 HISTORY OF ACUTE SINUSITIS: ICD-10-CM

## 2022-11-23 DIAGNOSIS — Z98.890 POSTOPERATIVE STATE: ICD-10-CM

## 2022-11-23 DIAGNOSIS — Z98.890 STATUS POST NASAL SEPTOPLASTY: ICD-10-CM

## 2022-11-23 DIAGNOSIS — J32.0 CHRONIC MAXILLARY SINUSITIS: Primary | ICD-10-CM

## 2022-11-23 DIAGNOSIS — J34.2 DEVIATED NASAL SEPTUM: ICD-10-CM

## 2022-11-23 DIAGNOSIS — J34.3 HYPERTROPHY OF BOTH INFERIOR NASAL TURBINATES: ICD-10-CM

## 2022-11-23 DIAGNOSIS — J32.2 CHRONIC ANTERIOR ETHMOIDAL SINUSITIS: ICD-10-CM

## 2022-11-23 DIAGNOSIS — J34.89 NASAL OBSTRUCTION: ICD-10-CM

## 2022-11-23 DIAGNOSIS — Z87.09 HISTORY OF ALLERGIC RHINITIS: ICD-10-CM

## 2022-11-23 PROCEDURE — 99024 POSTOP FOLLOW-UP VISIT: CPT | Performed by: OTOLARYNGOLOGY

## 2022-11-23 PROCEDURE — 31237 NSL/SINS NDSC SURG BX POLYPC: CPT | Mod: 50 | Performed by: OTOLARYNGOLOGY

## 2022-11-23 NOTE — NURSING NOTE
"Chief Complaint   Patient presents with     Post-op Visit     4 week PO FESS/Septum       Vitals:    11/23/22 1044   BP: 104/68   BP Location: Left arm   Patient Position: Sitting   Cuff Size: Adult Regular   Pulse: 74   SpO2: 98%   Weight: 77.1 kg (170 lb)   Height: 1.651 m (5' 5\")     Wt Readings from Last 1 Encounters:   11/23/22 77.1 kg (170 lb)       Leonor Galarza MA      "

## 2022-11-23 NOTE — LETTER
2022         RE: Teresa Salinas  [address] [facility] [address]        Dear Colleague,    Thank you for referring your patient, Teresa Salinas, to the [facility]. Please see a copy of my visit note below.    Chief Complaint   Patient presents with     Post-op Visit     4 week PO FESS/Septum     History of Present Illness  Teresa Salinas is a 37 year old female who presents today for follow-up.  The patient went to the operating room on 10/28/2022 for endonasal septoplasty and bilateral endoscopic sinus surgery.  The patient was seen for follow-up on 11/3/2022 for nasal splint removal and she was doing well.  She returns today for follow-up.      From a symptom standpoint, the patient reports that she recently developed a cold/upper respiratory illness.  Despite this, her nose has stayed open and she has been able to effectively rinse in the nose has been draining very well.  She is had minimal sinus symptoms.  The patient has otherwise done well postoperatively with minimal pain.  The patient does report some congestion but no significant nasal bleeding.  They presents today for nasal exam and debridement.      Past Medical History  Patient Active Problem List   Diagnosis     Allergic rhinitis due to dust mite     Mild intermittent asthma     Family history of thyroid disease     Allergic rhinitis due to pollen     Rh negative state in antepartum period     GBS bacteriuria     GBS (group B Streptococcus carrier), +RV culture, currently pregnant     Infection due to 2019 novel coronavirus      (spontaneous vaginal delivery)     Nexplanon insertion     Current Medications    Current Outpatient Medications:      cetirizine (ZYRTEC) 10 MG tablet, Take 10 mg by mouth daily , Disp: 30 tablet, Rfl: 1     Cholecalciferol (VITAMIN D) 2000 UNITS tablet, Take 2,000 Units by mouth daily, Disp: 100 tablet, Rfl: 3     etonogestrel (NEXPLANON) 68 MG IMPL, 1 each (68 mg) by  "Subdermal route once, Disp: , Rfl:      Prenatal Vit-Fe Fumarate-FA (PRENATAL MULTIVITAMIN W/IRON) 27-0.8 MG tablet, Take 1 tablet by mouth daily, Disp: , Rfl:      albuterol (PROAIR HFA/PROVENTIL HFA/VENTOLIN HFA) 108 (90 Base) MCG/ACT inhaler, Inhale 2 puffs into the lungs every 4 hours as needed for shortness of breath / dyspnea or wheezing (Patient not taking: Reported on 11/23/2022), Disp: 18 g, Rfl: 3     RABEprazole (ACIPHEX) 20 MG EC tablet, Take 1 tablet (20 mg) by mouth daily (Patient not taking: Reported on 11/23/2022), Disp: 30 tablet, Rfl: 3    Allergies  Allergies   Allergen Reactions     Contrast Dye Shortness Of Breath and Cough     States \"sneezing\" was the reaction.    Tolerated CT PE study during ED visit on 9/19/22.  Received benadryl prior to CT.  No side effects noted to contrast dye.     Clindamycin Hives       Social History  Social History     Socioeconomic History     Marital status:      Spouse name: Braxton     Number of children: 1     Years of education: 16   Occupational History     Occupation: Registered Nurse     Employer: [employer]     Comment: 5.5 years   Tobacco Use     Smoking status: Never     Smokeless tobacco: Never   Vaping Use     Vaping Use: Never used   Substance and Sexual Activity     Alcohol use: No     Alcohol/week: 0.0 standard drinks     Drug use: No     Sexual activity: Not Currently     Partners: Male   Other Topics Concern     Parent/sibling w/ CABG, MI or angioplasty before 65F 55M? No   Social History Narrative    November 26, 2018            ENVIRONMENTAL HISTORY: The [place] the [place] The home is heated with a forced air and gas furnace. They do have central air conditioning. The patient's bedroom is furnished with feather/wool bedding or pillows and carpeting in bedroom.  Pets inside the house include 1 dog(s). There is no history of cockroach or mice infestation. There is/are 0 " "smokers in the house.  The house does not have a damp basement.        Family History  Family History   Problem Relation Age of Onset     Hyperlipidemia Mother      Thyroid Disease Mother      Seasonal/Environmental Allergies Mother      Diabetes Father         typeII     Hypertension Father      Hyperlipidemia Father      Depression Father      Seasonal/Environmental Allergies Father      Asthma Sister      Seasonal/Environmental Allergies Sister      Other - See Comments Sister         May-Thurner syndrome     Thyroid Disease Sister      Hyperlipidemia Maternal Grandmother      Colon Cancer Maternal Grandfather      Chronic Obstructive Pulmonary Disease Paternal Grandmother      Esophageal Cancer Paternal Grandfather        Review of Systems  As per HPI and PMHx, otherwise 10 system review including the head and neck, constitutional, eyes, respiratory, GI, skin, neurologic, lymphatic, endocrine, and allergy systems is negative.    Physical Exam  /68 (BP Location: Left arm, Patient Position: Sitting, Cuff Size: Adult Regular)   Pulse 74   Ht 1.651 m (5' 5\")   Wt 77.1 kg (170 lb)   LMP 11/13/2021 (Approximate)   SpO2 98%   BMI 28.29 kg/m    GENERAL: Patient is a pleasant, cooperative 37 year old female in no acute distress.  HEAD: Normocephalic, atraumatic.  Hair and scalp are normal.  EYES: Pupils are equal, round, reactive to light and accommodation.  Extraocular movements are intact.  The sclera nonicteric without injection.  The extraocular structures are normal.  EARS: Normal shape and symmetry.  No tenderness when palpating the mastoid or tragal areas bilaterally.    NOSE: Nares are patent.  Nasal mucosa is pink and moist.  Negative anterior rhinoscopy.    NEUROLOGIC: Cranial nerves II through XII are grossly intact.  Voice is strong.  Patient is House-Brackman I/VI bilaterally.  CARDIOVASCULAR: Extremities are warm and well-perfused.  No significant peripheral edema.  RESPIRATORY: Patient has " nonlabored breathing without cough, wheeze, stridor.  PSYCHIATRIC: Patient is alert and oriented.  Mood and affect appear normal.  SKIN: Warm and dry.  No scalp, face, or neck lesions noted.    Procedure: Nasal sinus debridement   Indication: Status post endoscopic sinus surgery    To improve medication delivery and assist with healing following sinus surgery, the nasal cavities were examined and debrided.  I proceeded with rigid nasal endoscopic examination and debridement.  The bilateral nasal cavities were anesthetized with 4% lidocaine and phenylephrine spray.  The bilateral nasal cavities were visualized with flexible fiberoptic AMBU intervention endoscope.  The right nasal cavity was debrided suction.  The patient had a widely patent maxillary antrostomy and ethmoid cavity.  There was a slight amount of mucopurulent drainage.  The sphenoethmoid recess was clear.  The frontal sinus outflow area was clear.  The middle turbinate was well medialized without adhesion.  Attention was turned to the left.  The left nasal cavity was debrided suction.  The patient had a widely patent maxillary antrostomy and ethmoid cavity.  There was a slight amount of mucopurulent drainage.  The sphenoethmoid recess was clear.  The frontal sinus outflow area was clear.  The middle turbinate was well medialized without adhesion.  The scope was removed.  The patient tolerated the procedure well.    Assessment and Plan     ICD-10-CM    1. Chronic maxillary sinusitis  J32.0 NASAL/SINUS SCOPE WITH BIOPSY/POLYPECT/DEBRIDE,ENT      2. Chronic anterior ethmoidal sinusitis  J32.2 NASAL/SINUS SCOPE WITH BIOPSY/POLYPECT/DEBRIDE,ENT      3. History of acute sinusitis  Z87.09 NASAL/SINUS SCOPE WITH BIOPSY/POLYPECT/DEBRIDE,ENT      4. Nasal obstruction  J34.89 NASAL/SINUS SCOPE WITH BIOPSY/POLYPECT/DEBRIDE,ENT      5. Deviated nasal septum  J34.2 NASAL/SINUS SCOPE WITH BIOPSY/POLYPECT/DEBRIDE,ENT      6. Hypertrophy of both inferior nasal  turbinates  J34.3 NASAL/SINUS SCOPE WITH BIOPSY/POLYPECT/DEBRIDE,ENT      7. History of allergic rhinitis  Z87.09 NASAL/SINUS SCOPE WITH BIOPSY/POLYPECT/DEBRIDE,ENT      8. Status post functional endoscopic sinus surgery  Z98.890 NASAL/SINUS SCOPE WITH BIOPSY/POLYPECT/DEBRIDE,ENT      9. Status post nasal septoplasty  Z98.890 NASAL/SINUS SCOPE WITH BIOPSY/POLYPECT/DEBRIDE,ENT      10. Postoperative state  Z98.890 NASAL/SINUS SCOPE WITH BIOPSY/POLYPECT/DEBRIDE,ENT         It was my pleasure seeing Teresa Salinas today in clinic.  The patient is healing well after their septoplasty and endoscopic sinus surgery.  We discussed returning to normal activity and nose blowing.  I would have her use her irrigations while she is ill.  We discussed adding some Afrin for a few days to help clear some of the secretions.  Once she convalesces from her current upper respiratory illness, she can do the nasal irrigations as needed and return to any nasal sprays.  I would like to see the patient back in 2 to 3 months for recheck.  Patient knows to contact me sooner with any problems or concerns.    Miguel Angel Marcano MD  Department of Otolaryngology-Head and Neck Surgery  [facility]         Again, thank you for allowing me to participate in the care of your patient.        Sincerely,        Miguel Angel Marcano MD

## 2022-11-23 NOTE — PATIENT INSTRUCTIONS
RECURRENT ACUTE SINUSITIS INSTRUCTIONS    Nasal rinse and topical nasal spray once daily.   Sick Sinus Plan with symptoms of sinus infection.    You will be starting nasal saline irrigations and will need to obtain the following:      - NeilMed Sinus Rinse 8 oz Kit  - Distilled or filtered water   - Normal saline salt packets    Place filtered or distilled water into the NeilMed bottle up to the fill line (DO NOT USE TAP OR WELL WATER).  Place the pre-made salt packet in the 8 oz of saline.  Shake the bottle to suspend into solution.  Lean head forward over a sink or a basin.  Rinse each side of the nose with one-half of the bottle (each squeeze is about one-half of the bottle). Rinse the nose daily.     If you use topical nasal sprays, apply following irrigation.    SICK SINUS PLAN  If you feel like you are getting a sinus infection, obtain oxymetazoline (Afrin) nasal spray.  Oxymetazoline (Afrin) is available over the counter.  Place 2 sprays in each nostril.  Perform nasal saline irrigation in each nostril 20-30 minutes following application of the oxymetazoline spray.  You can use this medication for up to 3 days or 6 doses.  If your sinus symptoms are persistent, please contact your ENT surgeon.    Video example: [email or web address]://[email or web address].com/watch?[email or web address]

## 2023-01-05 ENCOUNTER — MEDICAL CORRESPONDENCE (OUTPATIENT)
Dept: HEALTH INFORMATION MANAGEMENT | Facility: CLINIC | Age: 38
End: 2023-01-05

## 2023-02-18 ENCOUNTER — HEALTH MAINTENANCE LETTER (OUTPATIENT)
Age: 38
End: 2023-02-18

## 2023-03-07 ENCOUNTER — MEDICAL CORRESPONDENCE (OUTPATIENT)
Dept: HEALTH INFORMATION MANAGEMENT | Facility: CLINIC | Age: 38
End: 2023-03-07
Payer: COMMERCIAL

## 2023-07-14 ENCOUNTER — TELEPHONE (OUTPATIENT)
Dept: OBGYN | Facility: CLINIC | Age: 38
End: 2023-07-14
Payer: COMMERCIAL

## 2023-07-14 NOTE — TELEPHONE ENCOUNTER
Panel Management Review    Composite cancer screening  Chart review shows that this patient is due/due soon for the following Pap Smear  Lab Results   Component Value Date    PAP NIL 06/29/2018     Past Surgical History:   Procedure Laterality Date    Arthoscopic right ankle Right 1999    ENDOSCOPIC ENDONASAL SURGERY Bilateral 10/28/2022    Procedure: FUNCTIONAL ENDOSCOPIC SINUS SURGERY (FESS);  Surgeon: Miguel Angel Marcano MD;  Location: [facility] OR    OSTEOTOMY ANKLE Right 2003    SEPTOPLASTY, TURBINOPLASTY, COMBINED N/A 10/28/2022    Procedure: SEPTOPLASTY, NOSE, WITH TURBINOPLASTY;  Surgeon: Miguel Angel Marcano MD;  Location: [facility] OR       Is hysterectomy listed in surgical history? No   Is mastectomy listed in surgical history? No     Summary:    Patient is due/failing the following:   Pap Smear    Action needed: Patient needs office visit for pap.    Type of outreach:  Sent [employer] message.      Staff Signature:  FORTINO MUNOZ MA

## 2023-07-14 NOTE — LETTER
July 14, 2023      Teresa Salinas  [address] [facility] [address]    Dear ,      This letter is to remind you that you are due for your follow up PAP smear and HPV test.    Please call [phone] to schedule your appointment at your earliest convenience.     If you have completed the tests outside of [facility], please have the results forwarded to our office. We will update the chart for your primary Physician to review before your next annual physical.     Sincerely,      Your [facility] Care Team

## 2023-09-12 ENCOUNTER — PATIENT OUTREACH (OUTPATIENT)
Dept: FAMILY MEDICINE | Facility: CLINIC | Age: 38
End: 2023-09-12
Payer: COMMERCIAL

## 2023-09-12 NOTE — TELEPHONE ENCOUNTER
Patient Quality Outreach    Patient is due for the following:   Asthma  -  Asthma follow-up visit    Next Steps:   Schedule a office visit for Asthma    Type of outreach:    Sent letter.    Gayle Chao NP

## 2023-09-12 NOTE — LETTER
September 12, 2023    To  Teresa Salinas  [address] [address] [facility] [address]    Your team at [facility] cares about your health. We have reviewed your chart and based on our findings; we are making the following recommendations to better manage your health.     You are in particular need of attention regarding the following:     Please make an appointment on your asthma for follow-up.    Healthy Regards,      Your [facility] Care Team

## 2023-09-26 NOTE — ED AVS SNAPSHOT
[facility]  [address] [address]  Phone:  [phone]  Fax:  [phone]                                    Teresa Salinas   MRN: [id number]    Department:  [facility]   Date of Visit:  2/24/2019           After Visit Summary Signature Page    I have received my discharge instructions, and my questions have been answered. I have discussed any challenges I see with this plan with the nurse or doctor.    ..........................................................................................................................................  Patient/Patient Representative Signature      ..........................................................................................................................................  Patient Representative Print Name and Relationship to Patient    ..................................................               ................................................  Date                                   Time    ..........................................................................................................................................  Reviewed by Signature/Title    ...................................................              ..............................................  Date                                               Time          22EPIC Rev 08/18        no

## 2023-12-04 NOTE — PROGRESS NOTES
Doing well.   Has been dealing with heart burn that is not well addressed with TUMS; inquires about alternative treatment options   Reports episode of sudden onset vaginal leakage of fluid when [place] was [place]; monitored herself for ongoing symptoms ie continued leakage of fluid, contractions, but this did not occur.   Denies VB, ctx, LOF. +FM  /60 (BP Location: Right arm, Patient Position: Chair, Cuff Size: Adult Regular)   Pulse 86   Temp 97.3  F (36.3  C) (Tympanic)   Wt 79.4 kg (175 lb)   LMP 12/10/2018   Breastfeeding? No   BMI 29.12 kg/m    General Appearance: NAD  Abdomen: Gravid, NT  Refer to flow sheet above.   A/P: 33 year old  at 24w4d  -- concerns addressed above, reassurance provided   -- recommend Zantac 150 mg BID for GERD; patient will pick this up OTC  -- reviewed normal fetal anatomy US  -- reviewed normal 1 hr GCT and normal CBC from today; syphilis test pending  -- PTL precautions reviewed  RTC in 4 weeks    Vaughn Gonsalez MD  [facility]             [facility]   Physical Therapy Treatment Note     Name: Ivy Salgado  Clinic Number: [id number]    Therapy Diagnosis:   Encounter Diagnosis   Name Primary?    Decreased range of motion of left shoulder Yes     Physician: Luis Madden DO    Visit Date: 12/4/2023     Evaluation Date: 8/8/2023  Authorization Period Expiration: 7/6/2024  Plan of Care Expiration: 12/31/2023  Progress Note Due: 9/10/2023  Visit # / Visits authorized: 30/ 40  Foto: 1/1      Time In: 4:30  Time Out: 5:20  Total Billable Time: 50 minutes   DOS: 7/18/2023  S/p: left  1. Total shoulder arthroplasty (complex, -22 modifier)  2. Shoulder lysis of adhesions  3. Shoulder subpectoral biceps tenodesis (CPT 77220)  Post-Op Precautions: We will follow the shoulder arthroplasty guidelines. The patient remained in a sling for 6 weeks. We will start PT at the 3-week martina.         Precautions: none    SUBJECTIVE     Pt reports: shoulder started throbbing at the end of the day after last session on Friday. Had to take pain pill over the weekend. Getting radicular symptoms again    She was compliant with home exercise program.  Response to previous treatment: improvement in pain at rest   Functional change: able to josefa being out of sling for a little bit     Pain: 3/10  Location: left shoulder      OBJECTIVE     Shoulder Flexion AROM: 110 pre manual  130 post manual ; ER AROM to 20  Shoulder PROM Flexion 160 after manual      Treatment       Ivy received the treatments listed below:      Therapeutic exercises to develop ROM for 08 minutes including:  UBE 4m/4m lvl 3 unilteral for endurance    Manual therapy techniques: Joint mobilizations and Soft tissue Mobilization were applied to the: L shoulder for 10 minutes, including:  Skilled PROM of L shoulder within tolerance and protocol in flexion/ER/IR, elbow flex/ext, wrist sup/pronation  Grade II mobs GHJ   METs for post/ant cuff activation       Neuromuscular re-education  activities to improve: motor control for 42 min  Shld table walk outs for flex 15x (anterior shoulder discomfort)  Sidelying ER 20x 5s holds (stopped 2* nerve pain)  Slot machine with fwd lunge 4x10 2# (stopped 2* anterior shoulder discomfort)  Wall slide 20x (stopped 2* nerve pain)  Resisted internal rotation otb 3x15 (walkouts today)  Resisted ER in available range 3x15 ytb  (walkouts today)  Rows YTB 3x10  Shoulder extensions YTB 3x10     Patient Education and Home Exercises     Home Exercises Provided and Patient Education Provided     Education provided:   - Add new exercises     Written Home Exercises Provided: yes. Exercises were reviewed and Ivy was able to demonstrate them prior to the end of the session.  Ivy demonstrated good  understanding of the education provided. See EMR under Patient Instructions for exercises provided during therapy sessions    ASSESSMENT     Session focused on reducing radicular symptoms today. Anterior shoulder discomfort with shoulder FL. Radicular symptoms with SL ER. Did not perform any interventions that reproduced symptoms. Pt noted relief with shoulder extensions.     Ivy Is progressing well towards her goals.     Pt prognosis is Fair.     Pt will continue to benefit from skilled outpatient physical therapy to address the deficits listed in the problem list box on initial evaluation, provide pt/family education and to maximize pt's level of independence in the home and community environment.     Pt's spiritual, cultural and educational needs considered and pt agreeable to plan of care and goals.     Anticipated barriers to physical therapy: none     Goals:   Short Term Goals: 12 weeks  1. Pt will be compliant with HEP 50% of prescribed amount.   2. The pt to demo improvement in R shoulder PROM to by 80% of the L  3.  The pt to demo tolerance to being out of the sling for 24 hours with pain <2/10     Long Term Goals:  36 weeks   Pt will be compliant with % of  prescribed amount.   The pt to improvement in AROM of L shoulder within 80% of R shoulder to improve tolerance to OH movement   The pt to  Demo at least 4+/5 of RTC muscle testing to demo improvement in tolerance to activity  The pt to tolerate lifting 50# from the ground to waist height per job requirement description   The pt will report full participation in ADLs and IADLs without restrictions related to L Shoulder.      PLAN     Follow TSA procedure     Vidya Lemus PTA,

## 2024-02-07 ENCOUNTER — OFFICE VISIT (OUTPATIENT)
Dept: FAMILY MEDICINE | Facility: CLINIC | Age: 39
End: 2024-02-07
Payer: COMMERCIAL

## 2024-02-07 VITALS
TEMPERATURE: 98.2 F | BODY MASS INDEX: 32.49 KG/M2 | HEIGHT: 65 IN | WEIGHT: 195 LBS | DIASTOLIC BLOOD PRESSURE: 80 MMHG | SYSTOLIC BLOOD PRESSURE: 115 MMHG | HEART RATE: 82 BPM | OXYGEN SATURATION: 99 % | RESPIRATION RATE: 16 BRPM

## 2024-02-07 DIAGNOSIS — G89.29 CHRONIC RIGHT-SIDED LOW BACK PAIN WITHOUT SCIATICA: Primary | ICD-10-CM

## 2024-02-07 DIAGNOSIS — M54.50 CHRONIC RIGHT-SIDED LOW BACK PAIN WITHOUT SCIATICA: Primary | ICD-10-CM

## 2024-02-07 PROCEDURE — 99213 OFFICE O/P EST LOW 20 MIN: CPT | Performed by: PHYSICIAN ASSISTANT

## 2024-02-07 RX ORDER — METHOCARBAMOL 500 MG/1
500 TABLET, FILM COATED ORAL 3 TIMES DAILY PRN
Qty: 60 TABLET | Refills: 1 | Status: SHIPPED | OUTPATIENT
Start: 2024-02-07

## 2024-02-07 ASSESSMENT — ASTHMA QUESTIONNAIRES
QUESTION_1 LAST FOUR WEEKS HOW MUCH OF THE TIME DID YOUR ASTHMA KEEP YOU FROM GETTING AS MUCH DONE AT WORK, SCHOOL OR AT HOME: NONE OF THE TIME
QUESTION_5 LAST FOUR WEEKS HOW WOULD YOU RATE YOUR ASTHMA CONTROL: WELL CONTROLLED
QUESTION_4 LAST FOUR WEEKS HOW OFTEN HAVE YOU USED YOUR RESCUE INHALER OR NEBULIZER MEDICATION (SUCH AS ALBUTEROL): TWO OR THREE TIMES PER WEEK
ACT_TOTALSCORE: 21
QUESTION_2 LAST FOUR WEEKS HOW OFTEN HAVE YOU HAD SHORTNESS OF BREATH: ONCE OR TWICE A WEEK
ACT_TOTALSCORE: 21
QUESTION_3 LAST FOUR WEEKS HOW OFTEN DID YOUR ASTHMA SYMPTOMS (WHEEZING, COUGHING, SHORTNESS OF BREATH, CHEST TIGHTNESS OR PAIN) WAKE YOU UP AT NIGHT OR EARLIER THAN USUAL IN THE MORNING: NOT AT ALL

## 2024-02-07 ASSESSMENT — PAIN SCALES - GENERAL: PAINLEVEL: MODERATE PAIN (4)

## 2024-02-07 ASSESSMENT — ENCOUNTER SYMPTOMS: BACK PAIN: 1
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
## 2024-02-12 ENCOUNTER — THERAPY VISIT (OUTPATIENT)
Dept: PHYSICAL THERAPY | Facility: CLINIC | Age: 39
End: 2024-02-12
Attending: PHYSICIAN ASSISTANT
Payer: COMMERCIAL

## 2024-02-12 DIAGNOSIS — G89.29 CHRONIC RIGHT-SIDED LOW BACK PAIN WITHOUT SCIATICA: ICD-10-CM

## 2024-02-12 DIAGNOSIS — M54.50 CHRONIC RIGHT-SIDED LOW BACK PAIN WITHOUT SCIATICA: ICD-10-CM

## 2024-02-12 PROCEDURE — 97140 MANUAL THERAPY 1/> REGIONS: CPT | Mod: GP | Performed by: PHYSICAL MEDICINE & REHABILITATION

## 2024-02-12 PROCEDURE — 97110 THERAPEUTIC EXERCISES: CPT | Mod: GP | Performed by: PHYSICAL MEDICINE & REHABILITATION

## 2024-02-12 PROCEDURE — 97161 PT EVAL LOW COMPLEX 20 MIN: CPT | Mod: GP | Performed by: PHYSICAL MEDICINE & REHABILITATION
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
## 2024-02-21 ENCOUNTER — THERAPY VISIT (OUTPATIENT)
Dept: PHYSICAL THERAPY | Facility: CLINIC | Age: 39
End: 2024-02-21
Attending: PHYSICIAN ASSISTANT
Payer: COMMERCIAL

## 2024-02-21 DIAGNOSIS — M54.50 CHRONIC RIGHT-SIDED LOW BACK PAIN WITHOUT SCIATICA: Primary | ICD-10-CM

## 2024-02-21 DIAGNOSIS — G89.29 CHRONIC RIGHT-SIDED LOW BACK PAIN WITHOUT SCIATICA: Primary | ICD-10-CM

## 2024-02-21 PROCEDURE — 97110 THERAPEUTIC EXERCISES: CPT | Mod: GP | Performed by: PHYSICAL MEDICINE & REHABILITATION

## 2024-02-21 PROCEDURE — 97140 MANUAL THERAPY 1/> REGIONS: CPT | Mod: GP | Performed by: PHYSICAL MEDICINE & REHABILITATION

## 2024-02-28 ENCOUNTER — THERAPY VISIT (OUTPATIENT)
Dept: PHYSICAL THERAPY | Facility: CLINIC | Age: 39
End: 2024-02-28
Attending: PHYSICIAN ASSISTANT
Payer: COMMERCIAL

## 2024-02-28 DIAGNOSIS — M54.50 CHRONIC RIGHT-SIDED LOW BACK PAIN WITHOUT SCIATICA: Primary | ICD-10-CM

## 2024-02-28 DIAGNOSIS — G89.29 CHRONIC RIGHT-SIDED LOW BACK PAIN WITHOUT SCIATICA: Primary | ICD-10-CM

## 2024-02-28 PROCEDURE — 97140 MANUAL THERAPY 1/> REGIONS: CPT | Mod: GP | Performed by: PHYSICAL MEDICINE & REHABILITATION

## 2024-02-28 PROCEDURE — 97110 THERAPEUTIC EXERCISES: CPT | Mod: GP | Performed by: PHYSICAL MEDICINE & REHABILITATION

## 2024-03-07 ENCOUNTER — THERAPY VISIT (OUTPATIENT)
Dept: PHYSICAL THERAPY | Facility: CLINIC | Age: 39
End: 2024-03-07
Attending: PHYSICIAN ASSISTANT
Payer: COMMERCIAL

## 2024-03-07 DIAGNOSIS — G89.29 CHRONIC RIGHT-SIDED LOW BACK PAIN WITHOUT SCIATICA: Primary | ICD-10-CM

## 2024-03-07 DIAGNOSIS — M54.50 CHRONIC RIGHT-SIDED LOW BACK PAIN WITHOUT SCIATICA: Primary | ICD-10-CM

## 2024-03-07 PROCEDURE — 97140 MANUAL THERAPY 1/> REGIONS: CPT | Mod: GP | Performed by: PHYSICAL MEDICINE & REHABILITATION

## 2024-03-07 PROCEDURE — 97110 THERAPEUTIC EXERCISES: CPT | Mod: GP | Performed by: PHYSICAL MEDICINE & REHABILITATION

## 2024-03-16 ENCOUNTER — HEALTH MAINTENANCE LETTER (OUTPATIENT)
Age: 39
End: 2024-03-16

## 2024-03-21 ENCOUNTER — THERAPY VISIT (OUTPATIENT)
Dept: PHYSICAL THERAPY | Facility: CLINIC | Age: 39
End: 2024-03-21
Attending: PHYSICIAN ASSISTANT
Payer: COMMERCIAL

## 2024-03-21 ENCOUNTER — MYC MEDICAL ADVICE (OUTPATIENT)
Dept: FAMILY MEDICINE | Facility: CLINIC | Age: 39
End: 2024-03-21

## 2024-03-21 DIAGNOSIS — M54.50 CHRONIC RIGHT-SIDED LOW BACK PAIN WITHOUT SCIATICA: Primary | ICD-10-CM

## 2024-03-21 DIAGNOSIS — G89.29 CHRONIC RIGHT-SIDED LOW BACK PAIN WITHOUT SCIATICA: Primary | ICD-10-CM

## 2024-03-21 PROCEDURE — 97110 THERAPEUTIC EXERCISES: CPT | Mod: GP | Performed by: PHYSICAL MEDICINE & REHABILITATION

## 2024-03-21 PROCEDURE — 97140 MANUAL THERAPY 1/> REGIONS: CPT | Mod: GP | Performed by: PHYSICAL MEDICINE & REHABILITATION

## 2024-03-21 NOTE — TELEPHONE ENCOUNTER
Routing to provider - pt last seen 2/7/24. Pt has been attending PT with little progress. PT is wondering if an Ortho referral would be appropriate next steps. Please see [employer] message for further detail and advise.     Thank you - Cheri Mclean, BSN, RN

## 2024-04-03 ENCOUNTER — THERAPY VISIT (OUTPATIENT)
Dept: PHYSICAL THERAPY | Facility: CLINIC | Age: 39
End: 2024-04-03
Attending: PHYSICIAN ASSISTANT
Payer: COMMERCIAL

## 2024-04-03 DIAGNOSIS — G89.29 CHRONIC RIGHT-SIDED LOW BACK PAIN WITHOUT SCIATICA: Primary | ICD-10-CM

## 2024-04-03 DIAGNOSIS — M54.50 CHRONIC RIGHT-SIDED LOW BACK PAIN WITHOUT SCIATICA: Primary | ICD-10-CM

## 2024-04-03 PROCEDURE — 97110 THERAPEUTIC EXERCISES: CPT | Mod: GP | Performed by: PHYSICAL MEDICINE & REHABILITATION

## 2024-04-03 PROCEDURE — 97140 MANUAL THERAPY 1/> REGIONS: CPT | Mod: GP | Performed by: PHYSICAL MEDICINE & REHABILITATION

## 2024-04-09 ENCOUNTER — OFFICE VISIT (OUTPATIENT)
Dept: PHYSICAL MEDICINE AND REHAB | Facility: CLINIC | Age: 39
End: 2024-04-09
Attending: PHYSICIAN ASSISTANT
Payer: COMMERCIAL

## 2024-04-09 VITALS
WEIGHT: 194.4 LBS | OXYGEN SATURATION: 100 % | SYSTOLIC BLOOD PRESSURE: 139 MMHG | HEART RATE: 79 BPM | HEIGHT: 65 IN | BODY MASS INDEX: 32.39 KG/M2 | DIASTOLIC BLOOD PRESSURE: 77 MMHG

## 2024-04-09 DIAGNOSIS — M54.50 CHRONIC RIGHT-SIDED LOW BACK PAIN WITHOUT SCIATICA: ICD-10-CM

## 2024-04-09 DIAGNOSIS — G89.29 CHRONIC RIGHT-SIDED LOW BACK PAIN WITHOUT SCIATICA: ICD-10-CM

## 2024-04-09 DIAGNOSIS — M79.18 MYOFASCIAL PAIN SYNDROME OF LUMBAR SPINE: Primary | ICD-10-CM

## 2024-04-09 PROCEDURE — 99204 OFFICE O/P NEW MOD 45 MIN: CPT | Performed by: STUDENT IN AN ORGANIZED HEALTH CARE EDUCATION/TRAINING PROGRAM

## 2024-04-09 ASSESSMENT — PAIN SCALES - GENERAL: PAINLEVEL: MODERATE PAIN (4)

## 2024-04-09 NOTE — PATIENT INSTRUCTIONS
~Spine Center Scheduling #[phone].  ~Please call our [facility] Spine Nurse Navigation #[phone] with any questions or concerns about your treatment plan, if symptoms worsen and you would like to be seen urgently, or if you have problems controlling bladder and bowel function.  ~For any future flareups or new symptoms, recommend follow-up in clinic or contact the nurse navigator line.  ~Please note that any My Chart messages may take multiple days for a response due to the high volume of patients seen in clinic.  Anything sent Friday night or after will be answered the following week when able.     An injection has been ordered today to potentially help with your pain symptoms. These injections do not fix what is going on in your back, therefore they typically do not take away the pain completely, however they can many times help improve symptoms. Injections should always be completed along with other modalities such as physical therapy for the best long term outcomes. If injections alone are done, then pain will likely return.     [facility] Injection Requirements    A  is required for all fluoroscopically-guided injections.  Injection appointments may be cancelled if there are signs/symptoms of an active infection or if the patient is being actively treated with antibiotics for a diagnosed infection.  Patients may have their steroid injection cancelled if they have had another steroid injection within 2 weeks.  Diabetic patients will have their blood glucose levels checked the day of their injection and the appointment will be rescheduled if the blood glucose level is 300 or higher.  Patients with allergies to cortisone, local anesthetics, iodine, or contrast dye should contact the Spine Center to further discuss these considerations.  Patients scheduled for medial branch block diagnostic injections should refrain from taking pain medication the day of the  procedure.  The medial branch block injection appointment will be rescheduled if the patient's pain rating is not 5/10 or greater at the time of the procedure.  Patients taking warfarin/Coumadin will have their INR checked the day of the procedure and the procedure may be rescheduled if the INR is greater than 3.0.  Please contact the Spine Center (#[phone]) if you are taking any prescription blood-thinning medications (warfarin, Plavix, Lovenox, Eliquis, Brilinta, Effient, etc.) as special dosing adjustments may need to be made depending on the type of injection you are scheduled to receive.  It is recommended that you delay having your steroid injection if you have received a flu shot or shingles vaccine within 2 weeks.
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
## 2024-05-01 ENCOUNTER — RADIOLOGY INJECTION OFFICE VISIT (OUTPATIENT)
Dept: PHYSICAL MEDICINE AND REHAB | Facility: CLINIC | Age: 39
End: 2024-05-01
Attending: STUDENT IN AN ORGANIZED HEALTH CARE EDUCATION/TRAINING PROGRAM
Payer: COMMERCIAL

## 2024-05-01 ENCOUNTER — THERAPY VISIT (OUTPATIENT)
Dept: PHYSICAL THERAPY | Facility: CLINIC | Age: 39
End: 2024-05-01
Attending: STUDENT IN AN ORGANIZED HEALTH CARE EDUCATION/TRAINING PROGRAM
Payer: COMMERCIAL

## 2024-05-01 VITALS
DIASTOLIC BLOOD PRESSURE: 68 MMHG | WEIGHT: 190 LBS | TEMPERATURE: 98.1 F | OXYGEN SATURATION: 98 % | HEIGHT: 65 IN | HEART RATE: 73 BPM | BODY MASS INDEX: 31.65 KG/M2 | SYSTOLIC BLOOD PRESSURE: 108 MMHG

## 2024-05-01 DIAGNOSIS — M54.50 CHRONIC RIGHT-SIDED LOW BACK PAIN WITHOUT SCIATICA: ICD-10-CM

## 2024-05-01 DIAGNOSIS — M79.18 MYOFASCIAL PAIN SYNDROME OF LUMBAR SPINE: ICD-10-CM

## 2024-05-01 DIAGNOSIS — G89.29 CHRONIC RIGHT-SIDED LOW BACK PAIN WITHOUT SCIATICA: ICD-10-CM

## 2024-05-01 PROCEDURE — 20553 NJX 1/MLT TRIGGER POINTS 3/>: CPT | Performed by: STUDENT IN AN ORGANIZED HEALTH CARE EDUCATION/TRAINING PROGRAM

## 2024-05-01 PROCEDURE — 97140 MANUAL THERAPY 1/> REGIONS: CPT | Mod: GP | Performed by: PHYSICAL THERAPIST

## 2024-05-01 PROCEDURE — 97164 PT RE-EVAL EST PLAN CARE: CPT | Mod: GP | Performed by: PHYSICAL THERAPIST

## 2024-05-01 PROCEDURE — 97110 THERAPEUTIC EXERCISES: CPT | Mod: GP | Performed by: PHYSICAL THERAPIST

## 2024-05-01 PROCEDURE — 76942 ECHO GUIDE FOR BIOPSY: CPT | Performed by: STUDENT IN AN ORGANIZED HEALTH CARE EDUCATION/TRAINING PROGRAM

## 2024-05-01 RX ORDER — LIDOCAINE HYDROCHLORIDE 10 MG/ML
INJECTION, SOLUTION EPIDURAL; INFILTRATION; INTRACAUDAL; PERINEURAL
Status: COMPLETED | OUTPATIENT
Start: 2024-05-01 | End: 2024-05-01

## 2024-05-01 RX ADMIN — LIDOCAINE HYDROCHLORIDE 5 ML: 10 INJECTION, SOLUTION EPIDURAL; INFILTRATION; INTRACAUDAL; PERINEURAL at 14:09

## 2024-05-01 ASSESSMENT — PAIN SCALES - GENERAL: PAINLEVEL: MODERATE PAIN (4)

## 2024-05-01 NOTE — PATIENT INSTRUCTIONS
DISCHARGE INSTRUCTIONS    During office hours (8:00 a.m.- 4:00 p.m.) questions or concerns may be answered  by calling [facility] Navigation Nurses at  [phone].  Messages received after hours will be returned the following business day.      In the case of an emergency, please dial 911 or seek assistance at the nearest Emergency Room/Urgent Care facility.     All Patients:    You may experience an increase in your symptoms for the first 2 days, once the numbing medication wears off.    You may resume your regular medication    You may shower. No swimming, tub bath or hot tub for 24 hours               POSSIBLE PROCEDURE SIDE EFFECTS  -Call Spine Center if concerned-    Increased Pain  Increased numbness/tingling     Nausea/Vomiting  Bruising/bleeding at site (hematoma)             Swelling at site (edema) Headache  Difficulty walking  Infection        Fever greater than 100.5

## 2024-05-01 NOTE — PROGRESS NOTES
PHYSICAL THERAPY PROGRESS NOTE    PLAN  Restart therapy per current plan of care.    Beginning/End Dates of Progress Note Reporting Period:  02/12/24 to 05/01/2024    Referring Provider:  Valentin Mistry     05/01/24 0500   Appointment Info   Signing clinician's name / credentials Bandarbrandi Langedianesergey, PT   Visits Used 7   Medical Diagnosis Chronic right-sided low back pain without sciatica (M54.50, G89.29)  - Primary   PT Tx Diagnosis low back pain   Progress Note/Certification   Onset of illness/injury or Date of Surgery 09/01/23   Therapy Frequency 1x/week   Predicted Duration 10 weeks   Progress Note Due Date 04/22/24   Progress Note Completed Date 02/12/24   GOALS   PT Goals 2;3   PT Goal 1   Goal Identifier STG   Goal Description Patient to tolerate more than 30 minutes of sitting with <3/10 LBP.   Target Date 03/11/24   Date Met 05/01/24   PT Goal 2   Goal Identifier STG  still valid extend date to 6/1   Goal Description Patient to state she is not limited by pain in regards to sleep quality.   Goal Progress stiff in morning, does not wake   Target Date 03/18/24   PT Goal 3   Goal Identifier LTG still valid extend date to 6/1   Goal Description Patient to report no increase of LBP with sitting on the floor playing with her kids.   Goal Progress still sore   Target Date 04/22/24   Subjective Report   Subjective Report went to PMR doctor, said return to PT ,work on more myofascial things, less strengthening, getting trigger point injections today, sx better at low back, worst a 4/10. increase playing floor with kids, RN IP pediatrics, primary sx mid ot low right thoracic, points to R side just below bra height   Objective Measures   Objective Measures Objective Measure 1   Objective Measure 1   Objective Measure posture: slight R curve in mid/low thoracic, muscle tension/tightness R mid/low thoracic, tighness right T2-4 also, elevated 2nd rib   Details LROM flex 100% pulls whole back, ext 100% no sx, SB L pivot point L3,  above that is straight, seated lumbar flexion, R low T higher rib hump vs mm tension, pelvis apprears level   Treatment Interventions (PT)   Interventions Therapeutic Procedure/Exercise;Manual Therapy   Therapeutic Procedure/Exercise   Therapeutic Procedures: strength, endurance, ROM, flexibility minutes (08218) 10   Ther Proc 1 - Details child's pose, LTSR, piriformis stretches, seated flexion and rotation stretches   Skilled Intervention stretching, modify HEP   Patient Response/Progress seated flexion pulsl entire spine head to butt   Manual Therapy   Manual Therapy: Mobilization, MFR, MLD, friction massage minutes (26765) 20   Manual Therapy 1 - Details MET pube clearing, MET FRSR L3, corerction neutral SB L low T, PA T2-7, costovert PA 2-6B, PA R lower ribs, STM and trigger points along R T6-L1,   Skilled Intervention jt mob, MET, STM to improve tissue mobility and pain tolerance   Patient Response/Progress no sx R QL, most tension R mid/low T   Eval/Assessments   Assessments PT Re-Eval   PT Eval, Re-eval Minutes (43486) 15   Education   Learner/Method Patient;Listening;Reading;Demonstration;Pictures/Video   Plan   Home program ptrx   Plan for next session restart PT 1x/wk x4-6 wks, work mm tension, joint mobility   Total Session Time   Timed Code Treatment Minutes 30     [facility]  Kris Hoenk  PT  [facility]  [address] [place] [address]  [email or web address]   Office: [phone]  Voicemail: [phone]

## 2024-05-15 ENCOUNTER — THERAPY VISIT (OUTPATIENT)
Dept: PHYSICAL THERAPY | Facility: CLINIC | Age: 39
End: 2024-05-15
Attending: STUDENT IN AN ORGANIZED HEALTH CARE EDUCATION/TRAINING PROGRAM
Payer: COMMERCIAL

## 2024-05-15 DIAGNOSIS — M54.50 CHRONIC RIGHT-SIDED LOW BACK PAIN WITHOUT SCIATICA: Primary | ICD-10-CM

## 2024-05-15 DIAGNOSIS — G89.29 CHRONIC RIGHT-SIDED LOW BACK PAIN WITHOUT SCIATICA: Primary | ICD-10-CM

## 2024-05-15 PROCEDURE — 97140 MANUAL THERAPY 1/> REGIONS: CPT | Mod: GP | Performed by: PHYSICAL THERAPIST

## 2024-05-23 ENCOUNTER — THERAPY VISIT (OUTPATIENT)
Dept: PHYSICAL THERAPY | Facility: CLINIC | Age: 39
End: 2024-05-23
Attending: STUDENT IN AN ORGANIZED HEALTH CARE EDUCATION/TRAINING PROGRAM
Payer: COMMERCIAL

## 2024-05-23 DIAGNOSIS — G89.29 CHRONIC RIGHT-SIDED LOW BACK PAIN WITHOUT SCIATICA: Primary | ICD-10-CM

## 2024-05-23 DIAGNOSIS — M54.50 CHRONIC RIGHT-SIDED LOW BACK PAIN WITHOUT SCIATICA: Primary | ICD-10-CM

## 2024-05-23 PROCEDURE — 97140 MANUAL THERAPY 1/> REGIONS: CPT | Mod: GP | Performed by: PHYSICAL THERAPIST

## 2024-05-24 ENCOUNTER — OFFICE VISIT (OUTPATIENT)
Dept: PHYSICAL MEDICINE AND REHAB | Facility: CLINIC | Age: 39
End: 2024-05-24
Payer: COMMERCIAL

## 2024-05-24 VITALS
DIASTOLIC BLOOD PRESSURE: 70 MMHG | HEIGHT: 65 IN | OXYGEN SATURATION: 97 % | BODY MASS INDEX: 31.99 KG/M2 | WEIGHT: 192 LBS | SYSTOLIC BLOOD PRESSURE: 124 MMHG | HEART RATE: 71 BPM

## 2024-05-24 DIAGNOSIS — M54.50 CHRONIC RIGHT-SIDED LOW BACK PAIN WITHOUT SCIATICA: ICD-10-CM

## 2024-05-24 DIAGNOSIS — M79.18 MYOFASCIAL PAIN SYNDROME OF LUMBAR SPINE: Primary | ICD-10-CM

## 2024-05-24 DIAGNOSIS — G89.29 CHRONIC RIGHT-SIDED LOW BACK PAIN WITHOUT SCIATICA: ICD-10-CM

## 2024-05-24 PROCEDURE — 99214 OFFICE O/P EST MOD 30 MIN: CPT | Performed by: STUDENT IN AN ORGANIZED HEALTH CARE EDUCATION/TRAINING PROGRAM

## 2024-05-24 ASSESSMENT — PAIN SCALES - GENERAL: PAINLEVEL: NO PAIN (1)

## 2024-05-24 NOTE — PROGRESS NOTES
ASSESSMENT:  Teresa Salinas is a 38 year old female presents for follow-up of :         Diagnoses and all orders for this visit:  Myofascial pain syndrome of lumbar spine  Chronic right-sided low back pain without sciatica         Patient is neurologically intact on exam. No myelopathic or red flag symptoms.    Patient is following up after TPI of right thoracolumbar muscle chain which has yielded about 80% global improvement of her back pain symptoms.  She has a focal area of pain and tenderness with a palpable myofascial knot in the area of the right quadratus lumborum.  We discussed potential treatment options for this last area, including a repeat TPI to target that remaining muscle versus OMT treatment with one of my colleagues.  After discussing the options patient would like to try OMT so we will have her schedule with one of my DO colleagues for that.  Follow-up as needed in the future if symptoms recur.  Patient is in agreement with this and would like to proceed.    For her sensory symptoms in the right anterior thigh, exam is reassuring with no numbness or weakness in the extremity.  Suspect this may be a temporary nerve compression of the femoral cutaneous nerve which we discussed in most cases self-limited and resolves over time.  If symptoms are persisting we can get advanced imaging and consider additional treatment options, and patient is in agreement with this plan.    PLAN:  Reviewed spine anatomy and disease process. Discussed diagnosis and treatment options with the patient today. A shared decision making model was used. The patient's values and choices were respected. The following represents what was discussed and decided upon by the provider and the patient.    1. DIAGNOSTIC TESTS  No new imaging orders at this time.    2. PHYSICAL THERAPY  Patient is already in PT or has a pending referral to begin soon.  Patient to follow-up with Dr. Hou or Dr. Martínez for OMT of the right  QL  Discussed the importance of core strengthening, ROM, stretching exercises with the patient and how each of these entities is important in decreasing pain.  Explained to the patient that the purpose of physical therapy is to teach the patient a home exercise program.  These exercises need to be performed every day in order to decrease pain and prevent future occurrences of pain.  Likened it to brushing one's teeth.      3. MEDICATIONS:  Discussed multiple medication options today with patient. Discussed risks, side effects, and proper use of medications. Patient verbalized understanding.  No new medications ordered at this visit.    4. INTERVENTIONS:  No further injections at this time    5. OTHER REFERRALS:  No other referrals at this time.    6. FOLLOW-UP  As needed.    Advised patient to call the Spine Center if symptoms worsen or you have problems controlling bladder and bowel function.   ______________________________________________________________________    SUBJECTIVE:   Teresa Salinas  is a 38 year old female who presents today for follow-up evaluation.    Patient reports the TPI injections performed 5/1/2024 revealed about 80% improvement of her overall back pain.  She has noticed there is a focal area of pain still lingering in her right flank approximately in the location of the QL.  Previous pain that was coming down from the intrascapular region and more in the lumbar paraspinals has improved considerably.  Overall patient is happy with the results of the injection but wishes to have that final area addressed if possible.    Pain worsens with forward flexion at the waist.  No pain with other movements, pain never radiates into the legs, no numbness or weakness of legs, no bladder or bowel incontinence reported.    Patient is currently in physical therapy which she reports has been more focused on stretches and exercises and not as much acupressure, myofascial release or trigger band work.    No  "prior back surgeries    Separately of this, patient has noticed intermittent cold sensation in her right anterior thigh starting 2 days ago.  Prior to and after that patient has had physical therapy involving the hip flexors.  There is no numbness, paresthesia, discoloration, or weakness involving the thigh or the hip.  Only symptom is a cold feeling which is intermittent in nature.    -Treatment to Date: PT, muscle relaxers, NSAIDs      Oswestry (RONNIE) Questionnaire        4/30/2024     8:46 PM   OSWESTRY DISABILITY INDEX   Count 10   Sum 11   Oswestry Score (%) 22 %       Neck Disability Index:       No data to display                       -Medications:    Current Outpatient Medications   Medication Sig Dispense Refill    albuterol (PROAIR HFA/PROVENTIL HFA/VENTOLIN HFA) 108 (90 Base) MCG/ACT inhaler Inhale 2 puffs into the lungs every 4 hours as needed for shortness of breath / dyspnea or wheezing 18 g 3    cetirizine (ZYRTEC) 10 MG tablet Take 10 mg by mouth daily  30 tablet 1    Cholecalciferol (VITAMIN D) 2000 UNITS tablet Take 2,000 Units by mouth daily 100 tablet 3    diclofenac (VOLTAREN) 50 MG EC tablet Take 1 tablet (50 mg) by mouth 2 times daily 60 tablet 0    etonogestrel (NEXPLANON) 68 MG IMPL 1 each (68 mg) by Subdermal route once      methocarbamol (ROBAXIN) 500 MG tablet Take 1 tablet (500 mg) by mouth 3 times daily as needed for muscle spasms 60 tablet 1    RABEprazole (ACIPHEX) 20 MG EC tablet Take 1 tablet (20 mg) by mouth daily 30 tablet 3     No current facility-administered medications for this visit.       Allergies   Allergen Reactions    Contrast Dye Shortness Of Breath and Cough     States \"sneezing\" was the reaction.    Tolerated CT PE study during ED visit on 9/19/22.  Received benadryl prior to CT.  No side effects noted to contrast dye.    Clindamycin Hives       Past Medical History:   Diagnosis Date    Allergic rhinitis 3/11/2014    Asthma 3/11/2014    Chickenpox     Depression     " Family history of thyroid disease 2015        Patient Active Problem List   Diagnosis    Allergic rhinitis due to dust mite    Mild intermittent asthma    Family history of thyroid disease    Allergic rhinitis due to pollen    Rh negative state in antepartum period    GBS bacteriuria    GBS (group B Streptococcus carrier), +RV culture, currently pregnant    Infection due to 2019 novel coronavirus     (spontaneous vaginal delivery)    Nexplanon insertion    Chronic right-sided low back pain without sciatica       Past Surgical History:   Procedure Laterality Date    Arthoscopic right ankle Right     ENDOSCOPIC ENDONASAL SURGERY Bilateral 10/28/2022    Procedure: FUNCTIONAL ENDOSCOPIC SINUS SURGERY (FESS);  Surgeon: Miguel Angel Marcano MD;  Location: [facility]    OSTEOTOMY ANKLE Right     SEPTOPLASTY, TURBINOPLASTY, COMBINED N/A 10/28/2022    Procedure: SEPTOPLASTY, NOSE, WITH TURBINOPLASTY;  Surgeon: Miguel Angel Marcano MD;  Location: [facility]       Family History   Problem Relation Age of Onset    Hyperlipidemia Mother     Thyroid Disease Mother     Seasonal/Environmental Allergies Mother     Diabetes Father         typeII    Hypertension Father     Hyperlipidemia Father     Depression Father     Seasonal/Environmental Allergies Father     Asthma Sister     Seasonal/Environmental Allergies Sister     Other - See Comments Sister         May-Thurner syndrome    Thyroid Disease Sister     Hyperlipidemia Maternal Grandmother     Colon Cancer Maternal Grandfather     Chronic Obstructive Pulmonary Disease Paternal Grandmother     Esophageal Cancer Paternal Grandfather        Reviewed past medical, surgical, and family history with patient found on new patient intake packet located in EMR Media tab.     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 Otherwise 13 systems reviewed are negative. Please see the patient's intake questionnaire from today for details.    OBJECTIVE:  /70 (BP Location: Left arm, Patient Position: Sitting, Cuff Size: Adult Regular)   Pulse 71   Ht 5' 5\" (1.651 m)   Wt 192 lb (87.1 kg)   SpO2 97%   BMI 31.95 kg/m      PHYSICAL EXAMINATION: Reviewed and updated as needed  --CONSTITUTIONAL: Vital signs as above. No acute distress. The patient is well nourished and well groomed.  --PSYCHIATRIC: The patient is awake, alert, oriented to person, place, time and answering questions appropriately with clear speech. Appropriate mood and affect   --RESPIRATORY: Normal rhythm and effort. No abnormal accessory muscle breathing patterns noted.   --GROSS MOTOR: Easily arises from a seated position.  --LUMBAR SPINE: Inspection reveals no evidence of deformity. Range of motion is not limited in flexion, extension, lateral rotation, but patient reports increased pain with flexion. Mild tenderness to palpation of right lateral QL, no tenderness in spinous processes.  Facet loading (Cao test) negative  --HIPS: Full range of motion bilaterally.  --LOWER EXTREMITY MOTOR TESTING:  Hip flexion left 5/5, right 5/5  Knee extension left 5/5, right 5/5  Ankle dorsiflexors left 5/5, right 5/5  Ankle plantarflexors left 5/5, right 5/5   Great toe extension left 5/5, right 5/5   Knee flexion left 5/5, right 5/5  --VASCULAR: Warm upper limbs bilaterally. Capillary refill in the upper extremities is less than 1 second.    RESULTS: Available medical records from [facility] and any other outside records were reviewed today.     Imaging:  Available relevant imaging was personally reviewed and interpreted today. The images were shown to the patient and the findings were explained using a spine model.    No results found.   "

## 2024-05-24 NOTE — PATIENT INSTRUCTIONS
~Spine Center Scheduling #[phone].  ~Please call our [facility] Spine Nurse Navigation #[phone] with any questions or concerns about your treatment plan, if symptoms worsen and you would like to be seen urgently, or if you have problems controlling bladder and bowel function.  ~For any future flareups or new symptoms, recommend follow-up in clinic or contact the nurse navigator line.  ~Please note that any My Chart messages may take multiple days for a response due to the high volume of patients seen in clinic.  Anything sent Friday night or after will be answered the following week when able.

## 2024-05-24 NOTE — LETTER
5/24/2024         RE: Teresa Salinas  [address] [address] [facility] [id number]        Dear Colleague,    Thank you for referring your patient, Teresa Salinas, to the [facility]. Please see a copy of my visit note below.    ASSESSMENT:  Teresa Salinas is a 38 year old female presents for follow-up of :         Diagnoses and all orders for this visit:  Myofascial pain syndrome of lumbar spine  Chronic right-sided low back pain without sciatica         Patient is neurologically intact on exam. No myelopathic or red flag symptoms.    Patient is following up after TPI of right thoracolumbar muscle chain which has yielded about 80% global improvement of her back pain symptoms.  She has a focal area of pain and tenderness with a palpable myofascial knot in the area of the right quadratus lumborum.  We discussed potential treatment options for this last area, including a repeat TPI to target that remaining muscle versus OMT treatment with one of my colleagues.  After discussing the options patient would like to try OMT so we will have her schedule with one of my DO colleagues for that.  Follow-up as needed in the future if symptoms recur.  Patient is in agreement with this and would like to proceed.    For her sensory symptoms in the right anterior thigh, exam is reassuring with no numbness or weakness in the extremity.  Suspect this may be a temporary nerve compression of the femoral cutaneous nerve which we discussed in most cases self-limited and resolves over time.  If symptoms are persisting we can get advanced imaging and consider additional treatment options, and patient is in agreement with this plan.    PLAN:  Reviewed spine anatomy and disease process. Discussed diagnosis and treatment options with the patient today. A shared decision making model was used. The patient's values and choices were respected. The following represents what was discussed and decided upon by  the provider and the patient.    1. DIAGNOSTIC TESTS  No new imaging orders at this time.    2. PHYSICAL THERAPY  Patient is already in PT or has a pending referral to begin soon.  Patient to follow-up with Dr. Hou or Dr. Martínez for OMT of the right QL  Discussed the importance of core strengthening, ROM, stretching exercises with the patient and how each of these entities is important in decreasing pain.  Explained to the patient that the purpose of physical therapy is to teach the patient a home exercise program.  These exercises need to be performed every day in order to decrease pain and prevent future occurrences of pain.  Likened it to brushing one's teeth.      3. MEDICATIONS:  Discussed multiple medication options today with patient. Discussed risks, side effects, and proper use of medications. Patient verbalized understanding.  No new medications ordered at this visit.    4. INTERVENTIONS:  No further injections at this time    5. OTHER REFERRALS:  No other referrals at this time.    6. FOLLOW-UP  As needed.    Advised patient to call the Spine Center if symptoms worsen or you have problems controlling bladder and bowel function.   ______________________________________________________________________    SUBJECTIVE:   Teresa Salinas  is a 38 year old female who presents today for follow-up evaluation.    Patient reports the TPI injections performed 5/1/2024 revealed about 80% improvement of her overall back pain.  She has noticed there is a focal area of pain still lingering in her right flank approximately in the location of the QL.  Previous pain that was coming down from the intrascapular region and more in the lumbar paraspinals has improved considerably.  Overall patient is happy with the results of the injection but wishes to have that final area addressed if possible.    Pain worsens with forward flexion at the waist.  No pain with other movements, pain never radiates into the legs, no  "numbness or weakness of legs, no bladder or bowel incontinence reported.    Patient is currently in physical therapy which she reports has been more focused on stretches and exercises and not as much acupressure, myofascial release or trigger band work.    No prior back surgeries    Separately of this, patient has noticed intermittent cold sensation in her right anterior thigh starting 2 days ago.  Prior to and after that patient has had physical therapy involving the hip flexors.  There is no numbness, paresthesia, discoloration, or weakness involving the thigh or the hip.  Only symptom is a cold feeling which is intermittent in nature.    -Treatment to Date: PT, muscle relaxers, NSAIDs      Oswestry (RONNIE) Questionnaire        4/30/2024     8:46 PM   OSWESTRY DISABILITY INDEX   Count 10   Sum 11   Oswestry Score (%) 22 %       Neck Disability Index:       No data to display                       -Medications:    Current Outpatient Medications   Medication Sig Dispense Refill     albuterol (PROAIR HFA/PROVENTIL HFA/VENTOLIN HFA) 108 (90 Base) MCG/ACT inhaler Inhale 2 puffs into the lungs every 4 hours as needed for shortness of breath / dyspnea or wheezing 18 g 3     cetirizine (ZYRTEC) 10 MG tablet Take 10 mg by mouth daily  30 tablet 1     Cholecalciferol (VITAMIN D) 2000 UNITS tablet Take 2,000 Units by mouth daily 100 tablet 3     diclofenac (VOLTAREN) 50 MG EC tablet Take 1 tablet (50 mg) by mouth 2 times daily 60 tablet 0     etonogestrel (NEXPLANON) 68 MG IMPL 1 each (68 mg) by Subdermal route once       methocarbamol (ROBAXIN) 500 MG tablet Take 1 tablet (500 mg) by mouth 3 times daily as needed for muscle spasms 60 tablet 1     RABEprazole (ACIPHEX) 20 MG EC tablet Take 1 tablet (20 mg) by mouth daily 30 tablet 3     No current facility-administered medications for this visit.       Allergies   Allergen Reactions     Contrast Dye Shortness Of Breath and Cough     States \"sneezing\" was the " reaction.    Tolerated CT PE study during ED visit on 22.  Received benadryl prior to CT.  No side effects noted to contrast dye.     Clindamycin Hives       Past Medical History:   Diagnosis Date     Allergic rhinitis 3/11/2014     Asthma 3/11/2014     Chickenpox      Depression      Family history of thyroid disease 2015        Patient Active Problem List   Diagnosis     Allergic rhinitis due to dust mite     Mild intermittent asthma     Family history of thyroid disease     Allergic rhinitis due to pollen     Rh negative state in antepartum period     GBS bacteriuria     GBS (group B Streptococcus carrier), +RV culture, currently pregnant     Infection due to 2019 novel coronavirus      (spontaneous vaginal delivery)     Nexplanon insertion     Chronic right-sided low back pain without sciatica       Past Surgical History:   Procedure Laterality Date     Arthoscopic right ankle Right      ENDOSCOPIC ENDONASAL SURGERY Bilateral 10/28/2022    Procedure: FUNCTIONAL ENDOSCOPIC SINUS SURGERY (FESS);  Surgeon: Miguel Angel Marcano MD;  Location: [facility] OR     OSTEOTOMY ANKLE Right      SEPTOPLASTY, TURBINOPLASTY, COMBINED N/A 10/28/2022    Procedure: SEPTOPLASTY, NOSE, WITH TURBINOPLASTY;  Surgeon: Miguel Angel Marcano MD;  Location: [facility] OR       Family History   Problem Relation Age of Onset     Hyperlipidemia Mother      Thyroid Disease Mother      Seasonal/Environmental Allergies Mother      Diabetes Father         typeII     Hypertension Father      Hyperlipidemia Father      Depression Father      Seasonal/Environmental Allergies Father      Asthma Sister      Seasonal/Environmental Allergies Sister      Other - See Comments Sister         May-Thurner syndrome     Thyroid Disease Sister      Hyperlipidemia Maternal Grandmother      Colon Cancer Maternal Grandfather      Chronic Obstructive Pulmonary Disease Paternal Grandmother      Esophageal Cancer Paternal Grandfather        Reviewed past medical, surgical,  "and family history with patient found on new patient intake packet located in EMR Media tab.     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 Otherwise 13 systems reviewed are negative. Please see the patient's intake questionnaire from today for details.    OBJECTIVE:  /70 (BP Location: Left arm, Patient Position: Sitting, Cuff Size: Adult Regular)   Pulse 71   Ht 5' 5\" (1.651 m)   Wt 192 lb (87.1 kg)   SpO2 97%   BMI 31.95 kg/m      PHYSICAL EXAMINATION: Reviewed and updated as needed  --CONSTITUTIONAL: Vital signs as above. No acute distress. The patient is well nourished and well groomed.  --PSYCHIATRIC: The patient is awake, alert, oriented to person, place, time and answering questions appropriately with clear speech. Appropriate mood and affect   --RESPIRATORY: Normal rhythm and effort. No abnormal accessory muscle breathing patterns noted.   --GROSS MOTOR: Easily arises from a seated position.  --LUMBAR SPINE: Inspection reveals no evidence of deformity. Range of motion is not limited in flexion, extension, lateral rotation, but patient reports increased pain with flexion. Mild tenderness to palpation of right lateral QL, no tenderness in spinous processes.  Facet loading (Cao test) negative  --HIPS: Full range of motion bilaterally.  --LOWER EXTREMITY MOTOR TESTING:  Hip flexion left 5/5, right 5/5  Knee extension left 5/5, right 5/5  Ankle dorsiflexors left 5/5, right 5/5  Ankle plantarflexors left 5/5, right 5/5   Great toe extension left 5/5, right 5/5   Knee flexion left 5/5, right 5/5  --VASCULAR: Warm upper limbs bilaterally. Capillary refill in the upper extremities is less than 1 second.    RESULTS: Available medical records from [facility] and any other outside records " were reviewed today.     Imaging:  Available relevant imaging was personally reviewed and interpreted today. The images were shown to the patient and the findings were explained using a spine model.    No results found.       Again, thank you for allowing me to participate in the care of your patient.        Sincerely,        Valentin Mistry MD

## 2024-05-29 ENCOUNTER — HOSPITAL ENCOUNTER (EMERGENCY)
Facility: CLINIC | Age: 39
Discharge: HOME | End: 2024-05-29
Payer: COMMERCIAL

## 2024-05-29 VITALS
TEMPERATURE: 97.3 F | SYSTOLIC BLOOD PRESSURE: 116 MMHG | DIASTOLIC BLOOD PRESSURE: 76 MMHG | OXYGEN SATURATION: 99 % | HEART RATE: 80 BPM | RESPIRATION RATE: 19 BRPM

## 2024-05-29 DIAGNOSIS — J02.9 PHARYNGITIS: ICD-10-CM

## 2024-05-29 DIAGNOSIS — J06.9 VIRAL URI: ICD-10-CM

## 2024-05-29 LAB
FLUAV RNA SPEC QL NAA+PROBE: NEGATIVE
FLUBV RNA RESP QL NAA+PROBE: NEGATIVE
GROUP A STREP BY PCR: NOT DETECTED
RSV RNA SPEC NAA+PROBE: NEGATIVE
SARS-COV-2 RNA RESP QL NAA+PROBE: NEGATIVE

## 2024-05-29 PROCEDURE — G0463 HOSPITAL OUTPT CLINIC VISIT: HCPCS

## 2024-05-29 PROCEDURE — 87651 STREP A DNA AMP PROBE: CPT

## 2024-05-29 PROCEDURE — 99213 OFFICE O/P EST LOW 20 MIN: CPT

## 2024-05-29 PROCEDURE — 87637 SARSCOV2&INF A&B&RSV AMP PRB: CPT

## 2024-05-29 ASSESSMENT — COLUMBIA-SUICIDE SEVERITY RATING SCALE - C-SSRS
2. HAVE YOU ACTUALLY HAD ANY THOUGHTS OF KILLING YOURSELF IN THE PAST MONTH?: NO
1. IN THE PAST MONTH, HAVE YOU WISHED YOU WERE DEAD OR WISHED YOU COULD GO TO SLEEP AND NOT WAKE UP?: NO
6. HAVE YOU EVER DONE ANYTHING, STARTED TO DO ANYTHING, OR PREPARED TO DO ANYTHING TO END YOUR LIFE?: NO

## 2024-05-29 ASSESSMENT — ACTIVITIES OF DAILY LIVING (ADL): ADLS_ACUITY_SCORE: 35

## 2024-05-29 NOTE — ED PROVIDER NOTES
"  History     Chief Complaint   Patient presents with    Pharyngitis     HPI  Teresa Salinas is a 38 year old female who presents for evaluation of pharyngitis, fatigue, body aches, and cough that initially began Monday night.  Reports her daughter was sick with coughing about 1 week ago, she does attend .  Symptoms are bit better today, however she is having more soreness in her throat along with pain with swallowing.  Reports she has not been eating or drinking as much as usual.  Has had low-grade fevers.  Has not tried anything for symptoms yet.  Denies any shortness of breath or difficulty with breathing, chest pain or tightness, abdominal pain, vomiting, or diarrhea.    Allergies:  Allergies   Allergen Reactions    Contrast Dye Shortness Of Breath and Cough     States \"sneezing\" was the reaction.    Tolerated CT PE study during ED visit on 22.  Received benadryl prior to CT.  No side effects noted to contrast dye.    Clindamycin Hives       Problem List:    Patient Active Problem List    Diagnosis Date Noted    Chronic right-sided low back pain without sciatica 2024     Priority: Medium    Nexplanon insertion 10/19/2022     Priority: Medium     10/19/22 nexplanon insertion lot# [id number]       (spontaneous vaginal delivery) 2022     Priority: Medium    GBS (group B Streptococcus carrier), +RV culture, currently pregnant 2022     Priority: Medium    Infection due to 2019 novel coronavirus 2022     Priority: Medium    GBS bacteriuria 2019     Priority: Medium    Rh negative state in antepartum period 2019     Priority: Medium    Allergic rhinitis due to pollen 2018     Priority: Medium     Percutaneous skin puncture testing for aeroallergens performed on 2018 showed sensitivity to dog and pollen of grass mix #7.      Mild intermittent asthma 2015     Priority: Medium    Family history of thyroid disease 2015     " Priority: Medium    Allergic rhinitis due to dust mite 03/11/2014     Priority: Medium        Past Medical History:    Past Medical History:   Diagnosis Date    Allergic rhinitis 3/11/2014    Asthma 3/11/2014    Chickenpox     Depression     Family history of thyroid disease 6/12/2015       Past Surgical History:    Past Surgical History:   Procedure Laterality Date    Arthoscopic right ankle Right 1999    ENDOSCOPIC ENDONASAL SURGERY Bilateral 10/28/2022    Procedure: FUNCTIONAL ENDOSCOPIC SINUS SURGERY (FESS);  Surgeon: Miguel Angel Marcano MD;  Location: [facility] OR    OSTEOTOMY ANKLE Right 2003    SEPTOPLASTY, TURBINOPLASTY, COMBINED N/A 10/28/2022    Procedure: SEPTOPLASTY, NOSE, WITH TURBINOPLASTY;  Surgeon: Miguel Angel Marcano MD;  Location: [facility] OR       Family History:    Family History   Problem Relation Age of Onset    Hyperlipidemia Mother     Thyroid Disease Mother     Seasonal/Environmental Allergies Mother     Diabetes Father         typeII    Hypertension Father     Hyperlipidemia Father     Depression Father     Seasonal/Environmental Allergies Father     Asthma Sister     Seasonal/Environmental Allergies Sister     Other - See Comments Sister         May-Thurner syndrome    Thyroid Disease Sister     Hyperlipidemia Maternal Grandmother     Colon Cancer Maternal Grandfather     Chronic Obstructive Pulmonary Disease Paternal Grandmother     Esophageal Cancer Paternal Grandfather        Social History:  Marital Status:   [2]  Social History     Tobacco Use    Smoking status: Never    Smokeless tobacco: Never   Vaping Use    Vaping status: Never Used   Substance Use Topics    Alcohol use: No     Alcohol/week: 0.0 standard drinks of alcohol    Drug use: No        Medications:    albuterol (PROAIR HFA/PROVENTIL HFA/VENTOLIN HFA) 108 (90 Base) MCG/ACT inhaler  cetirizine (ZYRTEC) 10 MG tablet  Cholecalciferol (VITAMIN D) 2000 UNITS tablet  diclofenac (VOLTAREN) 50 MG EC tablet  etonogestrel (NEXPLANON) 68 MG  IMPL  methocarbamol (ROBAXIN) 500 MG tablet  RABEprazole (ACIPHEX) 20 MG EC tablet          Review of Systems  Pertinent review of systems as documented per HPI above.    Physical Exam   BP: 116/76  Pulse: 80  Temp: 97.3  F (36.3  C)  Resp: 19  SpO2: 99 %      Physical Exam  Vitals and nursing note reviewed.   Constitutional:       General: She is not in acute distress.     Appearance: Normal appearance. She is well-developed. She is not ill-appearing, toxic-appearing or diaphoretic.   HENT:      Head: Normocephalic and atraumatic.      Right Ear: Tympanic membrane, ear canal and external ear normal.      Left Ear: Tympanic membrane, ear canal and external ear normal.      Nose: Nose normal. No congestion or rhinorrhea.      Mouth/Throat:      Mouth: Mucous membranes are moist.      Pharynx: Oropharynx is clear. Uvula midline. Posterior oropharyngeal erythema present. No oropharyngeal exudate.      Tonsils: No tonsillar abscesses.   Eyes:      Extraocular Movements: Extraocular movements intact.      Conjunctiva/sclera: Conjunctivae normal.   Cardiovascular:      Rate and Rhythm: Normal rate.   Pulmonary:      Effort: Pulmonary effort is normal. No respiratory distress.      Breath sounds: No wheezing.   Abdominal:      General: Abdomen is flat. Bowel sounds are normal. There is no distension.      Palpations: Abdomen is soft.      Tenderness: There is no abdominal tenderness. There is no guarding.   Musculoskeletal:      Cervical back: Normal range of motion and neck supple. No tenderness.   Lymphadenopathy:      Cervical: No cervical adenopathy.   Skin:     General: Skin is warm and dry.   Neurological:      General: No focal deficit present.      Mental Status: She is alert and oriented to person, place, and time.   Psychiatric:         Mood and Affect: Mood normal.         Behavior: Behavior normal.       ED Course       Results for orders placed or performed during the hospital encounter of 05/29/24 (from the  past 24 hour(s))   Group A Streptococcus PCR Throat Swab    Specimen: Throat; Swab   Result Value Ref Range    Group A strep by PCR Not Detected Not Detected    Narrative    The Xpert Xpress Strep A test, performed on the [employer], is a rapid, qualitative in vitro diagnostic test for the detection of Streptococcus pyogenes (Group A ß-hemolytic Streptococcus, Strep A) in throat swab specimens from patients with signs and symptoms of pharyngitis. The Xpert Xpress Strep A test can be used as an aid in the diagnosis of Group A Streptococcal pharyngitis. The assay is not intended to monitor treatment for Group A Streptococcus infections. The Xpert Xpress Strep A test utilizes an automated real-time polymerase chain reaction (PCR) to detect Streptococcus pyogenes DNA.   Symptomatic Influenza A/B, RSV, & SARS-CoV2 PCR (COVID-19) Nasopharyngeal    Specimen: Nasopharyngeal; Swab   Result Value Ref Range    Influenza A PCR Negative Negative    Influenza B PCR Negative Negative    RSV PCR Negative Negative    SARS CoV2 PCR Negative Negative    Narrative    Testing was performed using the Xpert Xpress CoV2/Flu/RSV Assay on the [employer] Instrument. This test should be ordered for the detection of SARS-CoV-2, influenza, and RSV viruses in individuals who meet clinical and/or epidemiological criteria. Test performance is unknown in asymptomatic patients. This test is for in vitro diagnostic use under the FDA EUA for laboratories certified under CLIA to perform high or moderate complexity testing. This test has not been FDA cleared or approved. A negative result does not rule out the presence of PCR inhibitors in the specimen or target RNA in concentration below the limit of detection for the assay. If only one viral target is positive but coinfection with multiple targets is suspected, the sample should be re-tested with another FDA cleared, approved, or authorized test, if coinfection would change  clinical management. This test was validated by the [facility]. These laboratories are certified under the Clinical Laboratory Improvement Amendments of 1988 (CLIA-88) as qualified to perform high complexity laboratory testing.       Medications - No data to display    Assessments & Plan (with Medical Decision Making)     38-year-old female who presents for evaluation of pharyngitis, body aches, fatigue that initially began a couple of days ago.  See HPI above.  On exam, she is well-appearing, afebrile with VS WNL.  Rest of exam as above.  Testing negative for influenza, RSV, COVID-19, and strep.  Suspect symptoms related to another viral upper respiratory illness.  Discussed supportive cares to aid with symptoms including pushing fluids, oral analgesics as needed for pain relief. Advised that if symptoms do not improve despite the recommended treatment, or if patient develops any new or worsening symptoms, that they return for further evaluation.  All questions answered.  Patient verbalizes understanding and agreement with the above plan.    Disclaimer: This note consists of symbols derived from keyboarding, dictation, and/or voice recognition software. As a result, there may be errors in the script that have gone undetected.  Please consider this when interpreting information found in the chart.    I have reviewed the nursing notes.    I have reviewed the findings, diagnosis, plan and need for follow up with the patient.    Discharge Medication List as of 5/29/2024  6:31 PM          Final diagnoses:   Pharyngitis   Viral URI       5/29/2024   [facility] EMERGENCY DEPT       Debora Harvey PA-C  05/29/24 [id number]

## 2024-06-12 ENCOUNTER — OFFICE VISIT (OUTPATIENT)
Dept: PHYSICAL MEDICINE AND REHAB | Facility: CLINIC | Age: 39
End: 2024-06-12
Payer: COMMERCIAL

## 2024-06-12 VITALS
SYSTOLIC BLOOD PRESSURE: 133 MMHG | BODY MASS INDEX: 31.99 KG/M2 | DIASTOLIC BLOOD PRESSURE: 62 MMHG | WEIGHT: 192 LBS | HEART RATE: 73 BPM | HEIGHT: 65 IN

## 2024-06-12 DIAGNOSIS — M79.18 MYOFASCIAL PAIN: ICD-10-CM

## 2024-06-12 DIAGNOSIS — M99.04 SOMATIC DYSFUNCTION OF SACROILIAC JOINT: ICD-10-CM

## 2024-06-12 DIAGNOSIS — M99.03 SOMATIC DYSFUNCTION OF LUMBAR REGION: ICD-10-CM

## 2024-06-12 DIAGNOSIS — M99.05 SOMATIC DYSFUNCTION OF PELVIS REGION: ICD-10-CM

## 2024-06-12 DIAGNOSIS — M99.06 SOMATIC DYSFUNCTION OF LOWER EXTREMITY: ICD-10-CM

## 2024-06-12 DIAGNOSIS — M54.50 LUMBAR SPINE PAIN: Primary | ICD-10-CM

## 2024-06-12 DIAGNOSIS — M99.08 SOMATIC DYSFUNCTION OF RIB REGION: ICD-10-CM

## 2024-06-12 DIAGNOSIS — M99.02 SOMATIC DYSFUNCTION OF THORACIC REGION: ICD-10-CM

## 2024-06-12 PROCEDURE — 98927 OSTEOPATH MANJ 5-6 REGIONS: CPT | Performed by: PHYSICAL MEDICINE & REHABILITATION

## 2024-06-12 PROCEDURE — 99213 OFFICE O/P EST LOW 20 MIN: CPT | Mod: 25 | Performed by: PHYSICAL MEDICINE & REHABILITATION

## 2024-06-12 ASSESSMENT — PAIN SCALES - GENERAL: PAINLEVEL: NO PAIN (1)

## 2024-06-12 NOTE — LETTER
6/12/2024      Teresa Salinas  [address] [facility] [address]      Dear Colleague,    Thank you for referring your patient, Teresa Salinas, to the [facility]. Please see a copy of my visit note below.    Assessment/Plan:      Teresa was seen today for back pain.    Diagnoses and all orders for this visit:    Lumbar spine pain    Myofascial pain    Somatic dysfunction of thoracic region  -     OSTEOPATHIC MANIP,5-6 BODY REGN    Somatic dysfunction of lumbar region  -     OSTEOPATHIC MANIP,5-6 BODY REGN    Somatic dysfunction of sacroiliac joint  -     OSTEOPATHIC MANIP,5-6 BODY REGN    Somatic dysfunction of pelvis region  -     OSTEOPATHIC MANIP,5-6 BODY REGN    Somatic dysfunction of lower extremity  -     OSTEOPATHIC MANIP,5-6 BODY REGN    Somatic dysfunction of rib region         Assessment: Pleasant 38 year old female with a history of asthma with:    1.   Chronic right-sided lumbar spine pain.  This is over the right L1-3 region paraspinals over the quadratus lumborum as well.  Likely myofascial mechanical.    2.  Somatic dysfunctions of the  thoracic spine, lumbar spine, sacrum, pelvis, lower extremities that contribute to the patient's pain complaints.    Discussion:    1.  We discussed her right lower lumbar spine pain.  We discussed options such as Osteopathic manipulative medicine TENS unit ischemic compression.  No imaging has been done as of yet of the lumbar spine.      2.  She was sent with the notion of Osteopathic manipulative medicine and she is very interested in this.  She is a good candidate.  I recommend Osteopathic manipulative medicine day she agrees to proceed.  Please see attached procedure note.    3.   I will provide home exercises for pelvic isometrics.    4.  Trial TENS unit over-the-counter.    5.  Follow-up 2 to 4 weeks.      It was our pleasure caring for your patient today, if there any questions or concerns please do not hesitate to  "contact us.      Subjective:   Patient ID: Teresa Salinas is a 38 year old female.    History of Present Illness: Patient presents at the request of Dr. Mistry for evaluation of low back pain flank pain potential for Osteopathic manipulative medicine she has right-sided low back pain mid to upper lumbar spine along the right lower ribs and over the iliac crest.  This been present for several years waxing waning.  Worse with prolonged sitting walking standing bending any flexion of the head and neck and upper back stiffness in the right low back.  Not much has helped.  Had trigger point injections of the right lumbar spine and the quadratus lumborum which helped the medial pain but is still having pain more laterally along the iliac crest.  Pain is a 4/10 at worst 1/10 today and at best.  Takes ibuprofen and Robaxin.       Imaging: None available    Review of Systems: Pertinent positives: None.  Pertinent negatives: No numbness, tingling or weakness.  No bowel or bladder incontinence.  No urinary retention.  No fevers, unintentional weight loss, balance changes, headaches, frequent falling, difficulty swallowing, or coordination difficulties.  All others reviewed are negative.         Past Medical History:   Diagnosis Date     Allergic rhinitis 3/11/2014     Asthma 3/11/2014     Chickenpox      Depression      Family history of thyroid disease 6/12/2015       The following portions of the patient's history were reviewed and updated as appropriate: allergies, current medications, past family history, past medical history, past social history, past surgical history and problem list.           Objective:   Physical Exam:    /62 (BP Location: Left arm, Patient Position: Sitting, Cuff Size: Adult Regular)   Pulse 73   Ht 5' 5\" (1.651 m)   Wt 192 lb (87.1 kg)   BMI 31.95 kg/m    There is no height or weight on file to calculate BMI.      General: Alert and oriented with normal affect. Attention, knowledge, " memory, and language are intact. No acute distress.   Eyes: Sclerae are clear.  Respirations: Unlabored. CV: No lower extremity edema.     Gait:  Nonantalgic  Full lumbar flexion finger to floor testing.  Tenderness over the right quadratus lumborum ribs 11 and 12 on the right as well as lumbar paraspinals L1-3.  Hypertonic tissue textures.  Sensation is intact to light touch throughout the lower extremities.  Reflexes are  2+ patellar and Achilles      Manual muscle testing reveals:  Right /Left out of 5     5/5 hip flexors  5/5 knee flexors  5/5 knee extensors  5/5 ankle plantar flexors  5/5 ankle dorsiflexors  5/5  EHL      Structural exam: Ribs: Ribs 11 and 12 lockdown on the right.  Thoracic spine:  T12 rotated left sidebent left. Lumbar spine: L5 rotated right sidebent left.  Quadratus lumborum trigger band.  Pelvis: Right innominate upslip, anterior inferior right innominate. Sacrum: Left unilateral sacral shear. Lower extremity: Hypertonic hip flexors and quadriceps bilaterally.      Procedure:    After discussing the risks and benefits of osteopathic manipulative medicine, verbal consent was obtained. The somatic dysfunctions listed above were treated with the following techniques: Ribs: Muscle energy.  Thoracic spine: Lateral recumbent muscle energy.  Lumbar spine: Soft tissue and lateral recumbent muscle energy, BLT. Pelvis: Still technique and Isometrics. Sacrum: Myofascial release.  Lower extremities: Muscle energy.   The patient tolerated the procedure well and had improved range of motion in all areas treated prior to leaving the clinic.      Again, thank you for allowing me to participate in the care of your patient.        Sincerely,        Campbell Stein DO

## 2024-06-12 NOTE — PROGRESS NOTES
Assessment/Plan:      Teresa was seen today for back pain.    Diagnoses and all orders for this visit:    Lumbar spine pain    Myofascial pain    Somatic dysfunction of thoracic region  -     OSTEOPATHIC MANIP,5-6 BODY REGN    Somatic dysfunction of lumbar region  -     OSTEOPATHIC MANIP,5-6 BODY REGN    Somatic dysfunction of sacroiliac joint  -     OSTEOPATHIC MANIP,5-6 BODY REGN    Somatic dysfunction of pelvis region  -     OSTEOPATHIC MANIP,5-6 BODY REGN    Somatic dysfunction of lower extremity  -     OSTEOPATHIC MANIP,5-6 BODY REGN    Somatic dysfunction of rib region         Assessment: Pleasant 38 year old female with a history of asthma with:    1.   Chronic right-sided lumbar spine pain.  This is over the right L1-3 region paraspinals over the quadratus lumborum as well.  Likely myofascial mechanical.    2.  Somatic dysfunctions of the  thoracic spine, lumbar spine, sacrum, pelvis, lower extremities that contribute to the patient's pain complaints.    Discussion:    1.  We discussed her right lower lumbar spine pain.  We discussed options such as Osteopathic manipulative medicine TENS unit ischemic compression.  No imaging has been done as of yet of the lumbar spine.      2.  She was sent with the notion of Osteopathic manipulative medicine and she is very interested in this.  She is a good candidate.  I recommend Osteopathic manipulative medicine day she agrees to proceed.  Please see attached procedure note.    3.   I will provide home exercises for pelvic isometrics.    4.  Trial TENS unit over-the-counter.    5.  Follow-up 2 to 4 weeks.      It was our pleasure caring for your patient today, if there any questions or concerns please do not hesitate to contact us.      Subjective:   Patient ID: Teresa Salinas is a 38 year old female.    History of Present Illness: Patient presents at the request of Dr. Mistry for evaluation of low back pain flank pain potential for Osteopathic manipulative medicine  "she has right-sided low back pain mid to upper lumbar spine along the right lower ribs and over the iliac crest.  This been present for several years waxing waning.  Worse with prolonged sitting walking standing bending any flexion of the head and neck and upper back stiffness in the right low back.  Not much has helped.  Had trigger point injections of the right lumbar spine and the quadratus lumborum which helped the medial pain but is still having pain more laterally along the iliac crest.  Pain is a 4/10 at worst 1/10 today and at best.  Takes ibuprofen and Robaxin.       Imaging: None available    Review of Systems: Pertinent positives: None.  Pertinent negatives: No numbness, tingling or weakness.  No bowel or bladder incontinence.  No urinary retention.  No fevers, unintentional weight loss, balance changes, headaches, frequent falling, difficulty swallowing, or coordination difficulties.  All others reviewed are negative.         Past Medical History:   Diagnosis Date    Allergic rhinitis 3/11/2014    Asthma 3/11/2014    Chickenpox     Depression     Family history of thyroid disease 6/12/2015       The following portions of the patient's history were reviewed and updated as appropriate: allergies, current medications, past family history, past medical history, past social history, past surgical history and problem list.           Objective:   Physical Exam:    /62 (BP Location: Left arm, Patient Position: Sitting, Cuff Size: Adult Regular)   Pulse 73   Ht 5' 5\" (1.651 m)   Wt 192 lb (87.1 kg)   BMI 31.95 kg/m    There is no height or weight on file to calculate BMI.      General: Alert and oriented with normal affect. Attention, knowledge, memory, and language are intact. No acute distress.   Eyes: Sclerae are clear.  Respirations: Unlabored. CV: No lower extremity edema.     Gait:  Nonantalgic  Full lumbar flexion finger to floor testing.  Tenderness over the right quadratus lumborum ribs 11 and " 12 on the right as well as lumbar paraspinals L1-3.  Hypertonic tissue textures.  Sensation is intact to light touch throughout the lower extremities.  Reflexes are  2+ patellar and Achilles      Manual muscle testing reveals:  Right /Left out of 5     5/5 hip flexors  5/5 knee flexors  5/5 knee extensors  5/5 ankle plantar flexors  5/5 ankle dorsiflexors  5/5  EHL      Structural exam: Ribs: Ribs 11 and 12 lockdown on the right.  Thoracic spine:  T12 rotated left sidebent left. Lumbar spine: L5 rotated right sidebent left.  Quadratus lumborum trigger band.  Pelvis: Right innominate upslip, anterior inferior right innominate. Sacrum: Left unilateral sacral shear. Lower extremity: Hypertonic hip flexors and quadriceps bilaterally.      Procedure:    After discussing the risks and benefits of osteopathic manipulative medicine, verbal consent was obtained. The somatic dysfunctions listed above were treated with the following techniques: Ribs: Muscle energy.  Thoracic spine: Lateral recumbent muscle energy.  Lumbar spine: Soft tissue and lateral recumbent muscle energy, BLT. Pelvis: Still technique and Isometrics. Sacrum: Myofascial release.  Lower extremities: Muscle energy.   The patient tolerated the procedure well and had improved range of motion in all areas treated prior to leaving the clinic.

## 2024-07-10 ENCOUNTER — OFFICE VISIT (OUTPATIENT)
Dept: PHYSICAL MEDICINE AND REHAB | Facility: CLINIC | Age: 39
End: 2024-07-10
Payer: COMMERCIAL

## 2024-07-10 VITALS — HEART RATE: 74 BPM | DIASTOLIC BLOOD PRESSURE: 70 MMHG | SYSTOLIC BLOOD PRESSURE: 123 MMHG

## 2024-07-10 DIAGNOSIS — M99.02 SOMATIC DYSFUNCTION OF THORACIC REGION: ICD-10-CM

## 2024-07-10 DIAGNOSIS — M99.04 SOMATIC DYSFUNCTION OF SACROILIAC JOINT: ICD-10-CM

## 2024-07-10 DIAGNOSIS — M99.05 SOMATIC DYSFUNCTION OF PELVIS REGION: ICD-10-CM

## 2024-07-10 DIAGNOSIS — M79.18 MYOFASCIAL PAIN: ICD-10-CM

## 2024-07-10 DIAGNOSIS — M99.03 SOMATIC DYSFUNCTION OF LUMBAR REGION: ICD-10-CM

## 2024-07-10 DIAGNOSIS — M99.06 SOMATIC DYSFUNCTION OF LOWER EXTREMITY: ICD-10-CM

## 2024-07-10 DIAGNOSIS — M54.50 LUMBAR SPINE PAIN: Primary | ICD-10-CM

## 2024-07-10 PROCEDURE — 98927 OSTEOPATH MANJ 5-6 REGIONS: CPT | Performed by: PHYSICAL MEDICINE & REHABILITATION

## 2024-07-10 PROCEDURE — 99213 OFFICE O/P EST LOW 20 MIN: CPT | Mod: 25 | Performed by: PHYSICAL MEDICINE & REHABILITATION

## 2024-07-10 ASSESSMENT — PAIN SCALES - GENERAL: PAINLEVEL: MILD PAIN (2)

## 2024-07-10 NOTE — PATIENT INSTRUCTIONS
A physical therapy order was provided for you today.  You will be contacted by physical therapy.  If nobody contacts you within 3 to 5 days, please contact the clinic at [phone].  It will be very important for you to do your physical therapy exercises on a regular basis to decrease your pain and prevent future pain flares.   An MRI and xray was ordered for you today.  You will be contacted by scheduling within 3 days.    If you are not contacted, please call Radiology at [phone].     Osteopathic manual medicine today

## 2024-07-10 NOTE — PROGRESS NOTES
Assessment/Plan:      Teresa was seen today for follow up and back pain.    Diagnoses and all orders for this visit:    Lumbar spine pain  -     XR Lumbar Flex/Ext 2/3 Views; Future  -     MR Lumbar Spine w/o Contrast; Future    Myofascial pain    Somatic dysfunction of thoracic region  -     OSTEOPATHIC MANIP,5-6 BODY REGN    Somatic dysfunction of lower extremity  -     OSTEOPATHIC MANIP,5-6 BODY REGN    Somatic dysfunction of lumbar region  -     OSTEOPATHIC MANIP,5-6 BODY REGN    Somatic dysfunction of sacroiliac joint  -     OSTEOPATHIC MANIP,5-6 BODY REGN    Somatic dysfunction of pelvis region  -     OSTEOPATHIC MANIP,5-6 BODY REGN         Assessment: Pleasant 38 year old female  with a history of asthma with:     1.   Waxing waning chronic right-sided lumbar spine pain.  This is over the right far lateral L1-3 region paraspinals over the quadratus lumborum and flank pain is unknown etiology.  May have myofascial mechanical component however this is been present for several years worsening over time.  Mild benefit for a few days after initial OMT treatment and then pain has again worsened.  No radicular symptoms.     2.  Somatic dysfunctions of the thoracic spine, lumbar spine, sacrum, pelvis, lower extremities that contribute to the patient's pain complaints.         Discussion:    1.  I discussed the diagnosis and treatment options.  I discussed the options of imaging as we do not have any specific imaging of the lumbar spine, additional physical therapy.  She has had extensive therapy thus far but only with home exercises standard program.  Also discussed option of additional OMT,    2.  Patient presents for Osteopathic manipulative medicine today.  Good candidate for second treatment.  She agrees to proceed.  Please see attached procedure note.  I would recommend 1 additional OMT trial.    3.  Recommend MRI lumbar spine to evaluate for pathology resulting in right lateral pain such as facet arthropathy  far lateral disc pathology.    4.  Flexion-extension x-rays lumbar spine evaluate for any instability.    5.  Would like her to get started on [facility] program as well.    6.  Continue methocarbamol as needed for pain at night    7.  Follow-up with Dr. Mistry after imaging completed.    It was our pleasure caring for your patient today, if there any questions or concerns please do not hesitate to contact us.      Subjective:   Patient ID: Teresa Salinas is a 38 year old female.    History of Present Illness: Patient presents for follow-up of bilateral lumbar spine pain.  Persistent pain started with tightness around 2017 has progressed to more pain over the past year and a half or so despite physical therapy home exercises doing these regularly.  On methocarbamol and NSAIDs which helped mildly.  Methocarbamol helps at night.  OMT at last visit gave couple days of mild benefit and then the pain has returned and even worsened.  Pain is a 5/10 at worst 2/10 today 1/10 at best.  Worse with activity or prolonged sitting or standing.  Far lateral over the lower ribs flank and L1-3 region.  Better with pressure in the lumbar spine.  No radiation down legs paresthesias or weakness.    Imaging: I reviewed the CT of the chest from 2022.  Stability of the lower lumbar ribs.  No significant degenerative changes or thoracic spine.  Cephalad portion of the right kidney unremarkable.    Review of Systems: Pertinent positives: None.  Pertinent negatives: No numbness, tingling or weakness.  No bowel or bladder incontinence.  No urinary retention.  No fevers, unintentional weight loss, balance changes, headaches, frequent falling, difficulty swallowing, or coordination difficulties.  All others reviewed are negative.         Past Medical History:   Diagnosis Date    Allergic rhinitis 3/11/2014    Asthma 3/11/2014    Chickenpox     Depression     Family history of thyroid disease 6/12/2015       The following portions  of the patient's history were reviewed and updated as appropriate: allergies, current medications, past family history, past medical history, past social history, past surgical history and problem list.           Objective:   Physical Exam:    /70   Pulse 74   There is no height or weight on file to calculate BMI.      General: Alert and oriented with normal affect. Attention, knowledge, memory, and language are intact. No acute distress.   Eyes: Sclerae are clear.  Respirations: Unlabored. CV: No lower extremity edema.     Gait:  Nonantalgic  Mild hypertonic tissue textures right lateral lumbar paraspinals.  Sensation is intact to light touch throughout the   lower extremities.  Reflexes are 2+ patellar and Achilles      Manual muscle testing reveals:  Right /Left out of 5     5/5 hip flexors  5/5 knee flexors  5/5 knee extensors  5/5 ankle plantar flexors  5/5 ankle dorsiflexors  5/5    ankle evertors    Structural exam:   Thoracic spine:   T12 rotated left sidebent left. Lumbar spine: L5 rotated right sidebent left. Pelvis: Right innominate upslip, anterior inferior right innominate. Sacrum: Left on left forward sacral torsion. Lower extremity: Hypertonic hip flexors and quadriceps bilaterally..      Procedure:    After discussing the risks and benefits of osteopathic manipulative medicine, verbal consent was obtained. The somatic dysfunctions listed above were treated with the following techniques:  Thoracic spine: Lateral recumbent muscle energy.  Lumbar spine: Soft tissue and lateral recumbent muscle energy. Pelvis: Still technique and Isometrics. Sacrum: Myofascial release.  Lower extremities: Muscle energy.    The patient tolerated the procedure well and had improved range of motion in all areas treated prior to leaving the clinic.

## 2024-07-10 NOTE — LETTER
7/10/2024      Teresa Salinas  [address] [facility] [address]      Dear Colleague,    Thank you for referring your patient, Teresa Salinas, to the [facility]. Please see a copy of my visit note below.    Assessment/Plan:      Teresa was seen today for follow up and back pain.    Diagnoses and all orders for this visit:    Lumbar spine pain  -     XR Lumbar Flex/Ext 2/3 Views; Future  -     MR Lumbar Spine w/o Contrast; Future    Myofascial pain    Somatic dysfunction of thoracic region  -     OSTEOPATHIC MANIP,5-6 BODY REGN    Somatic dysfunction of lower extremity  -     OSTEOPATHIC MANIP,5-6 BODY REGN    Somatic dysfunction of lumbar region  -     OSTEOPATHIC MANIP,5-6 BODY REGN    Somatic dysfunction of sacroiliac joint  -     OSTEOPATHIC MANIP,5-6 BODY REGN    Somatic dysfunction of pelvis region  -     OSTEOPATHIC MANIP,5-6 BODY REGN         Assessment: Pleasant 38 year old female  with a history of asthma with:     1.   Waxing waning chronic right-sided lumbar spine pain.  This is over the right far lateral L1-3 region paraspinals over the quadratus lumborum and flank pain is unknown etiology.  May have myofascial mechanical component however this is been present for several years worsening over time.  Mild benefit for a few days after initial OMT treatment and then pain has again worsened.  No radicular symptoms.     2.  Somatic dysfunctions of the thoracic spine, lumbar spine, sacrum, pelvis, lower extremities that contribute to the patient's pain complaints.         Discussion:    1.  I discussed the diagnosis and treatment options.  I discussed the options of imaging as we do not have any specific imaging of the lumbar spine, additional physical therapy.  She has had extensive therapy thus far but only with home exercises standard program.  Also discussed option of additional OMT,    2.  Patient presents for Osteopathic manipulative medicine today.  Good  candidate for second treatment.  She agrees to proceed.  Please see attached procedure note.  I would recommend 1 additional OMT trial.    3.  Recommend MRI lumbar spine to evaluate for pathology resulting in right lateral pain such as facet arthropathy far lateral disc pathology.    4.  Flexion-extension x-rays lumbar spine evaluate for any instability.    5.  Would like her to get started on [facility] program as well.    6.  Continue methocarbamol as needed for pain at night    7.  Follow-up with Dr. Mistry after imaging completed.    It was our pleasure caring for your patient today, if there any questions or concerns please do not hesitate to contact us.      Subjective:   Patient ID: Teresa Salinas is a 38 year old female.    History of Present Illness: Patient presents for follow-up of bilateral lumbar spine pain.  Persistent pain started with tightness around 2017 has progressed to more pain over the past year and a half or so despite physical therapy home exercises doing these regularly.  On methocarbamol and NSAIDs which helped mildly.  Methocarbamol helps at night.  OMT at last visit gave couple days of mild benefit and then the pain has returned and even worsened.  Pain is a 5/10 at worst 2/10 today 1/10 at best.  Worse with activity or prolonged sitting or standing.  Far lateral over the lower ribs flank and L1-3 region.  Better with pressure in the lumbar spine.  No radiation down legs paresthesias or weakness.    Imaging: I reviewed the CT of the chest from 2022.  Stability of the lower lumbar ribs.  No significant degenerative changes or thoracic spine.  Cephalad portion of the right kidney unremarkable.    Review of Systems: Pertinent positives: None.  Pertinent negatives: No numbness, tingling or weakness.  No bowel or bladder incontinence.  No urinary retention.  No fevers, unintentional weight loss, balance changes, headaches, frequent falling, difficulty swallowing, or coordination  difficulties.  All others reviewed are negative.         Past Medical History:   Diagnosis Date     Allergic rhinitis 3/11/2014     Asthma 3/11/2014     Chickenpox      Depression      Family history of thyroid disease 6/12/2015       The following portions of the patient's history were reviewed and updated as appropriate: allergies, current medications, past family history, past medical history, past social history, past surgical history and problem list.           Objective:   Physical Exam:    /70   Pulse 74   There is no height or weight on file to calculate BMI.      General: Alert and oriented with normal affect. Attention, knowledge, memory, and language are intact. No acute distress.   Eyes: Sclerae are clear.  Respirations: Unlabored. CV: No lower extremity edema.     Gait:  Nonantalgic  Mild hypertonic tissue textures right lateral lumbar paraspinals.  Sensation is intact to light touch throughout the   lower extremities.  Reflexes are 2+ patellar and Achilles      Manual muscle testing reveals:  Right /Left out of 5     5/5 hip flexors  5/5 knee flexors  5/5 knee extensors  5/5 ankle plantar flexors  5/5 ankle dorsiflexors  5/5    ankle evertors    Structural exam:   Thoracic spine:   T12 rotated left sidebent left. Lumbar spine: L5 rotated right sidebent left. Pelvis: Right innominate upslip, anterior inferior right innominate. Sacrum: Left on left forward sacral torsion. Lower extremity: Hypertonic hip flexors and quadriceps bilaterally..      Procedure:    After discussing the risks and benefits of osteopathic manipulative medicine, verbal consent was obtained. The somatic dysfunctions listed above were treated with the following techniques:  Thoracic spine: Lateral recumbent muscle energy.  Lumbar spine: Soft tissue and lateral recumbent muscle energy. Pelvis: Still technique and Isometrics. Sacrum: Myofascial release.  Lower extremities: Muscle energy.    The patient tolerated the procedure  well and had improved range of motion in all areas treated prior to leaving the clinic.          Again, thank you for allowing me to participate in the care of your patient.        Sincerely,        Campbell Stein, DO

## 2024-07-19 ENCOUNTER — HOSPITAL ENCOUNTER (OUTPATIENT)
Dept: GENERAL RADIOLOGY | Facility: CLINIC | Age: 39
Discharge: HOME | End: 2024-07-19
Attending: PHYSICAL MEDICINE & REHABILITATION | Admitting: PHYSICAL MEDICINE & REHABILITATION
Payer: COMMERCIAL

## 2024-07-19 DIAGNOSIS — M54.50 LUMBAR SPINE PAIN: ICD-10-CM

## 2024-07-19 PROCEDURE — 72120 X-RAY BEND ONLY L-S SPINE: CPT

## 2024-07-22 ENCOUNTER — MYC MEDICAL ADVICE (OUTPATIENT)
Dept: PHYSICAL MEDICINE AND REHAB | Facility: CLINIC | Age: 39
End: 2024-07-22
Payer: COMMERCIAL

## 2024-07-22 DIAGNOSIS — M79.18 MYOFASCIAL PAIN SYNDROME OF LUMBAR SPINE: Primary | ICD-10-CM

## 2024-07-23 NOTE — TELEPHONE ENCOUNTER
It is completely reasonable to try another set of TPI before we proceed to MRI. Will place the order and patient can call to schedule.

## 2024-08-09 ENCOUNTER — TELEPHONE (OUTPATIENT)
Dept: PHYSICAL MEDICINE AND REHAB | Facility: CLINIC | Age: 39
End: 2024-08-09
Payer: COMMERCIAL

## 2024-08-09 NOTE — TELEPHONE ENCOUNTER
Phone call to patient to discuss response from Dr. Mistry. He would do injection if patient asymptomatic and afebrile for 5 days. Stated understanding. Patient would also need to wear a mask.     Explained this would also be reviewed with clinic manager on Monday and she would then be called again. Stated understanding and appreciation for call back.

## 2024-08-09 NOTE — TELEPHONE ENCOUNTER
"PSP:  Valentin Mistry M.D.     Last clinic visit:  5/24/24 with PSP; 7/10/24 for OMT with Dr. Stein  Reason for call: Patient wanting to know if she needs to reschedule her TPIs for 8/14.   Clinical information:  States she started feeling ill on 8/5 (fatigue, body aches and mild cough). She tested positive for COVID the next day 8/6. \"I am pretty much over it already.\"   Advice given to patient: Will review with provider doing injection as to whether he wants her to come. She will be informed of response. Patient OK with phone call or message through [employer].   Provider to address: Please advise.   "

## 2024-08-12 NOTE — TELEPHONE ENCOUNTER
Phone call to patient to check on status and moving forward with the injection on 8/14. Left message to return call.

## 2024-08-14 ENCOUNTER — RADIOLOGY INJECTION OFFICE VISIT (OUTPATIENT)
Dept: PHYSICAL MEDICINE AND REHAB | Facility: CLINIC | Age: 39
End: 2024-08-14
Attending: STUDENT IN AN ORGANIZED HEALTH CARE EDUCATION/TRAINING PROGRAM
Payer: COMMERCIAL

## 2024-08-14 VITALS
HEIGHT: 65 IN | BODY MASS INDEX: 31.65 KG/M2 | OXYGEN SATURATION: 98 % | SYSTOLIC BLOOD PRESSURE: 114 MMHG | DIASTOLIC BLOOD PRESSURE: 72 MMHG | HEART RATE: 69 BPM | WEIGHT: 190 LBS | TEMPERATURE: 98.2 F

## 2024-08-14 DIAGNOSIS — M79.18 MYOFASCIAL PAIN SYNDROME OF LUMBAR SPINE: ICD-10-CM

## 2024-08-14 PROCEDURE — 20553 NJX 1/MLT TRIGGER POINTS 3/>: CPT | Performed by: STUDENT IN AN ORGANIZED HEALTH CARE EDUCATION/TRAINING PROGRAM

## 2024-08-14 PROCEDURE — 76942 ECHO GUIDE FOR BIOPSY: CPT | Performed by: STUDENT IN AN ORGANIZED HEALTH CARE EDUCATION/TRAINING PROGRAM

## 2024-08-14 RX ORDER — LIDOCAINE HYDROCHLORIDE 10 MG/ML
INJECTION, SOLUTION EPIDURAL; INFILTRATION; INTRACAUDAL; PERINEURAL
Status: COMPLETED | OUTPATIENT
Start: 2024-08-14 | End: 2024-08-14

## 2024-08-14 RX ADMIN — LIDOCAINE HYDROCHLORIDE 5 ML: 10 INJECTION, SOLUTION EPIDURAL; INFILTRATION; INTRACAUDAL; PERINEURAL at 11:56

## 2024-08-14 ASSESSMENT — PAIN SCALES - GENERAL: PAINLEVEL: NO PAIN (1)

## 2024-08-14 NOTE — TELEPHONE ENCOUNTER
Pt called back inquiring if she could come in for her appt today. She is feeling well she reports. She is asymptomatic and has not had a fever in over 5 days.   Discussed with charge RN and called patient. Left message that she is OK to come in for her appt today.

## 2024-08-28 ENCOUNTER — OFFICE VISIT (OUTPATIENT)
Dept: PHYSICAL MEDICINE AND REHAB | Facility: CLINIC | Age: 39
End: 2024-08-28
Payer: COMMERCIAL

## 2024-08-28 VITALS
HEIGHT: 65 IN | WEIGHT: 190 LBS | DIASTOLIC BLOOD PRESSURE: 70 MMHG | BODY MASS INDEX: 31.65 KG/M2 | SYSTOLIC BLOOD PRESSURE: 123 MMHG | HEART RATE: 76 BPM

## 2024-08-28 DIAGNOSIS — M79.18 MYOFASCIAL PAIN SYNDROME OF LUMBAR SPINE: Primary | ICD-10-CM

## 2024-08-28 PROCEDURE — 99214 OFFICE O/P EST MOD 30 MIN: CPT | Performed by: STUDENT IN AN ORGANIZED HEALTH CARE EDUCATION/TRAINING PROGRAM

## 2024-08-28 ASSESSMENT — PAIN SCALES - GENERAL: PAINLEVEL: NO PAIN (1)

## 2024-08-28 NOTE — PROGRESS NOTES
ASSESSMENT:  Teresa Salinas is a 38 year old female presents for follow-up of :         Diagnoses and all orders for this visit:  Myofascial pain syndrome of lumbar spine           Patient is neurologically intact on exam. No myelopathic or red flag symptoms.    Patient is following up after second TPI to target the lower lumbar paraspinals and QL muscle, which is resulted in a further 60% improvement of her symptoms.  We again reviewed her imaging which shows no significant degenerative changes in the lumbar spine, and I do not think advanced imaging would yield any additional information as she is without any symptoms suggesting neural compression or disc herniation.  At this point with 2 trigger point injections done to target the paraspinal chain along the right side, we discussed that the neck step would be preventative and maintenance type therapy to improve the range of motion and strength of the lumbar paraspinal chain which would help reduce the risk of recurrence.  Trigger point injections can be done up to 3-4 times a year with most insurances, so with 2 done already we should focus on other strategies to help maintain what has been gained with the TPI's.  To that end, patient encouraged to perform her stretches on a daily basis and start incorporating some yoga if she can.  She can also use her TENS unit at home as another adjunct to help with myofascial pain symptoms.  At this juncture we will plan to follow-up as needed in the future for possible repeat TPI's.  Patient is in agreement with this plan and all questions were addressed.    PLAN:  Reviewed spine anatomy and disease process. Discussed diagnosis and treatment options with the patient today. A shared decision making model was used. The patient's values and choices were respected. The following represents what was discussed and decided upon by the provider and the patient.    1. DIAGNOSTIC TESTS  No new imaging orders at this time.    2.  PHYSICAL THERAPY  Patient is already in PT or has a pending referral to begin soon.  Patient to follow-up with Dr. Hou or Dr. Martínez for OMT of the right QL  Discussed the importance of core strengthening, ROM, stretching exercises with the patient and how each of these entities is important in decreasing pain.  Explained to the patient that the purpose of physical therapy is to teach the patient a home exercise program.  These exercises need to be performed every day in order to decrease pain and prevent future occurrences of pain.  Likened it to brushing one's teeth.      3. MEDICATIONS:  Discussed multiple medication options today with patient. Discussed risks, side effects, and proper use of medications. Patient verbalized understanding.  No new medications ordered at this visit.    4. INTERVENTIONS:  No further injections at this time    5. OTHER REFERRALS:  No other referrals at this time.    6. FOLLOW-UP  As needed.    Advised patient to call the [facility] if symptoms worsen or you have problems controlling bladder and bowel function.   ______________________________________________________________________    SUBJECTIVE:   Teresa Salinas  is a 38 year old female who presents today for follow-up evaluation.    Patient reports the second set of trigger point injections targeting the lower lumbar paraspinals and QL on the right side resulted in about 60% improvement of the remaining pain that had still been there after the first injection.  She states that she is doing her home exercises and stretches but on an intermittent basis.  No new numbness, weakness, or bladder bowel incontinence.  Pain is in the same area of the right lower back and worsens with movement that puts the lower back into stretch.    No prior back surgeries    -Treatment to Date: PT, muscle relaxers, NSAIDs, has a TENS unit but has not used it yet    5/1/24 - Mistry - TPI of right rhomboid, levator, lumbar paraspinals - 80%  "improvement  24 - Mistry - TPI of right lower lumbar paraspinals and QL - 60% improvement      Oswestry (RONNIE) Questionnaire        2024     8:46 PM   OSWESTRY DISABILITY INDEX   Count 10   Sum 11   Oswestry Score (%) 22 %       Neck Disability Index:       No data to display                       -Medications:    Current Outpatient Medications   Medication Sig Dispense Refill    albuterol (PROAIR HFA/PROVENTIL HFA/VENTOLIN HFA) 108 (90 Base) MCG/ACT inhaler Inhale 2 puffs into the lungs every 4 hours as needed for shortness of breath / dyspnea or wheezing 18 g 3    cetirizine (ZYRTEC) 10 MG tablet Take 10 mg by mouth daily  30 tablet 1    Cholecalciferol (VITAMIN D) 2000 UNITS tablet Take 2,000 Units by mouth daily 100 tablet 3    etonogestrel (NEXPLANON) 68 MG IMPL 1 each (68 mg) by Subdermal route once      methocarbamol (ROBAXIN) 500 MG tablet Take 1 tablet (500 mg) by mouth 3 times daily as needed for muscle spasms (Patient not taking: Reported on 2024) 60 tablet 1     No current facility-administered medications for this visit.       Allergies   Allergen Reactions    Contrast Dye Shortness Of Breath and Cough     States \"sneezing\" was the reaction.    Tolerated CT PE study during ED visit on 22.  Received benadryl prior to CT.  No side effects noted to contrast dye.    Clindamycin Hives       Past Medical History:   Diagnosis Date    Allergic rhinitis 3/11/2014    Asthma 3/11/2014    Chickenpox     Depression     Family history of thyroid disease 2015        Patient Active Problem List   Diagnosis    Allergic rhinitis due to dust mite    Mild intermittent asthma    Family history of thyroid disease    Allergic rhinitis due to pollen    Rh negative state in antepartum period    GBS bacteriuria    GBS (group B Streptococcus carrier), +RV culture, currently pregnant    Infection due to 2019 novel coronavirus     (spontaneous vaginal delivery)    Nexplanon insertion    Chronic right-sided " "low back pain without sciatica       Past Surgical History:   Procedure Laterality Date    Arthoscopic right ankle Right 1999    ENDOSCOPIC ENDONASAL SURGERY Bilateral 10/28/2022    Procedure: FUNCTIONAL ENDOSCOPIC SINUS SURGERY (FESS);  Surgeon: Miguel Angel Marcano MD;  Location: [facility]    OSTEOTOMY ANKLE Right 2003    SEPTOPLASTY, TURBINOPLASTY, COMBINED N/A 10/28/2022    Procedure: SEPTOPLASTY, NOSE, WITH TURBINOPLASTY;  Surgeon: Miguel Angel Marcano MD;  Location: [facility]       Family History   Problem Relation Age of Onset    Hyperlipidemia Mother     Thyroid Disease Mother     Seasonal/Environmental Allergies Mother     Diabetes Father         typeII    Hypertension Father     Hyperlipidemia Father     Depression Father     Seasonal/Environmental Allergies Father     Asthma Sister     Seasonal/Environmental Allergies Sister     Other - See Comments Sister         May-Thurner syndrome    Thyroid Disease Sister     Hyperlipidemia Maternal Grandmother     Colon Cancer Maternal Grandfather     Chronic Obstructive Pulmonary Disease Paternal Grandmother     Esophageal Cancer Paternal Grandfather        Reviewed past medical, surgical, and family history with patient found on new patient intake packet located in EMR Media tab.     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 Otherwise 13 systems reviewed are negative. Please see the patient's intake questionnaire from today for details.    OBJECTIVE:  /70 (BP Location: Left arm, Patient Position: Sitting, Cuff Size: Adult Regular)   Pulse 76   Ht 5' 5\" (1.651 m)   Wt 190 lb (86.2 kg)   BMI 31.62 kg/m      PHYSICAL EXAMINATION: Reviewed and updated as needed  --CONSTITUTIONAL: Vital signs as above. No acute distress. The patient is well nourished and well " groomed.  --PSYCHIATRIC: The patient is awake, alert, oriented to person, place, time and answering questions appropriately with clear speech. Appropriate mood and affect   --RESPIRATORY: Normal rhythm and effort. No abnormal accessory muscle breathing patterns noted.   --GROSS MOTOR: Easily arises from a seated position.  --LUMBAR SPINE: Inspection reveals no evidence of deformity. Range of motion is not limited in flexion, extension, lateral rotation, but patient reports increased pain in right lower back with flexion and left lateral bending. Mild tenderness to palpation of right lateral QL and lumbar paraspinals, no tenderness in spinous processes.  Facet loading (Cao test) negative  --HIPS: Full range of motion bilaterally.  --LOWER EXTREMITY MOTOR TESTING:  Hip flexion left 5/5, right 5/5  Knee extension left 5/5, right 5/5  Ankle dorsiflexors left 5/5, right 5/5  Ankle plantarflexors left 5/5, right 5/5   Great toe extension left 5/5, right 5/5   Knee flexion left 5/5, right 5/5  --VASCULAR: Warm upper limbs bilaterally. Capillary refill in the upper extremities is less than 1 second.    RESULTS: Available medical records from [facility] and any other outside records were reviewed today.     Imaging:  Available relevant imaging was personally reviewed and interpreted today. The images were shown to the patient and the findings were explained using a spine model.    7/19/2024 XR lumbar spine:    IMPRESSION: 5 lumbar type vertebrae. Normal alignment in the neutral,  flexed and extended positions. Vertebral body heights normal. No  fractures. No significant degenerative change.

## 2024-08-28 NOTE — LETTER
8/28/2024      Teresa Salinas  [address] [id number]      Dear Colleague,    Thank you for referring your patient, Teresa Salinas, to the [facility]. Please see a copy of my visit note below.    ASSESSMENT:  Teresa Salinas is a 38 year old female presents for follow-up of :         Diagnoses and all orders for this visit:  Myofascial pain syndrome of lumbar spine           Patient is neurologically intact on exam. No myelopathic or red flag symptoms.    Patient is following up after second TPI to target the lower lumbar paraspinals and QL muscle, which is resulted in a further 60% improvement of her symptoms.  We again reviewed her imaging which shows no significant degenerative changes in the lumbar spine, and I do not think advanced imaging would yield any additional information as she is without any symptoms suggesting neural compression or disc herniation.  At this point with 2 trigger point injections done to target the paraspinal chain along the right side, we discussed that the neck step would be preventative and maintenance type therapy to improve the range of motion and strength of the lumbar paraspinal chain which would help reduce the risk of recurrence.  Trigger point injections can be done up to 3-4 times a year with most insurances, so with 2 done already we should focus on other strategies to help maintain what has been gained with the TPI's.  To that end, patient encouraged to perform her stretches on a daily basis and start incorporating some yoga if she can.  She can also use her TENS unit at home as another adjunct to help with myofascial pain symptoms.  At this juncture we will plan to follow-up as needed in the future for possible repeat TPI's.  Patient is in agreement with this plan and all questions were addressed.    PLAN:  Reviewed spine anatomy and disease process. Discussed diagnosis and treatment options with the patient today.  A shared decision making model was used. The patient's values and choices were respected. The following represents what was discussed and decided upon by the provider and the patient.    1. DIAGNOSTIC TESTS  No new imaging orders at this time.    2. PHYSICAL THERAPY  Patient is already in PT or has a pending referral to begin soon.  Patient to follow-up with Dr. Hou or Dr. Martínez for OMT of the right QL  Discussed the importance of core strengthening, ROM, stretching exercises with the patient and how each of these entities is important in decreasing pain.  Explained to the patient that the purpose of physical therapy is to teach the patient a home exercise program.  These exercises need to be performed every day in order to decrease pain and prevent future occurrences of pain.  Likened it to brushing one's teeth.      3. MEDICATIONS:  Discussed multiple medication options today with patient. Discussed risks, side effects, and proper use of medications. Patient verbalized understanding.  No new medications ordered at this visit.    4. INTERVENTIONS:  No further injections at this time    5. OTHER REFERRALS:  No other referrals at this time.    6. FOLLOW-UP  As needed.    Advised patient to call the [facility] if symptoms worsen or you have problems controlling bladder and bowel function.   ______________________________________________________________________    SUBJECTIVE:   Teresa Salinas  is a 38 year old female who presents today for follow-up evaluation.    Patient reports the second set of trigger point injections targeting the lower lumbar paraspinals and QL on the right side resulted in about 60% improvement of the remaining pain that had still been there after the first injection.  She states that she is doing her home exercises and stretches but on an intermittent basis.  No new numbness, weakness, or bladder bowel incontinence.  Pain is in the same area of the right lower back and worsens  "with movement that puts the lower back into stretch.    No prior back surgeries    -Treatment to Date: PT, muscle relaxers, NSAIDs, has a TENS unit but has not used it yet    5/1/24 - Mistry - TPI of right rhomboid, levator, lumbar paraspinals - 80% improvement  8/14/24 - Mistry - TPI of right lower lumbar paraspinals and QL - 60% improvement      Oswestry (RONNIE) Questionnaire        4/30/2024     8:46 PM   OSWESTRY DISABILITY INDEX   Count 10   Sum 11   Oswestry Score (%) 22 %       Neck Disability Index:       No data to display                       -Medications:    Current Outpatient Medications   Medication Sig Dispense Refill     albuterol (PROAIR HFA/PROVENTIL HFA/VENTOLIN HFA) 108 (90 Base) MCG/ACT inhaler Inhale 2 puffs into the lungs every 4 hours as needed for shortness of breath / dyspnea or wheezing 18 g 3     cetirizine (ZYRTEC) 10 MG tablet Take 10 mg by mouth daily  30 tablet 1     Cholecalciferol (VITAMIN D) 2000 UNITS tablet Take 2,000 Units by mouth daily 100 tablet 3     etonogestrel (NEXPLANON) 68 MG IMPL 1 each (68 mg) by Subdermal route once       methocarbamol (ROBAXIN) 500 MG tablet Take 1 tablet (500 mg) by mouth 3 times daily as needed for muscle spasms (Patient not taking: Reported on 8/28/2024) 60 tablet 1     No current facility-administered medications for this visit.       Allergies   Allergen Reactions     Contrast Dye Shortness Of Breath and Cough     States \"sneezing\" was the reaction.    Tolerated CT PE study during ED visit on 9/19/22.  Received benadryl prior to CT.  No side effects noted to contrast dye.     Clindamycin Hives       Past Medical History:   Diagnosis Date     Allergic rhinitis 3/11/2014     Asthma 3/11/2014     Chickenpox      Depression      Family history of thyroid disease 6/12/2015        Patient Active Problem List   Diagnosis     Allergic rhinitis due to dust mite     Mild intermittent asthma     Family history of thyroid disease     Allergic rhinitis due to " pollen     Rh negative state in antepartum period     GBS bacteriuria     GBS (group B Streptococcus carrier), +RV culture, currently pregnant     Infection due to 2019 novel coronavirus      (spontaneous vaginal delivery)     Nexplanon insertion     Chronic right-sided low back pain without sciatica       Past Surgical History:   Procedure Laterality Date     Arthoscopic right ankle Right      ENDOSCOPIC ENDONASAL SURGERY Bilateral 10/28/2022    Procedure: FUNCTIONAL ENDOSCOPIC SINUS SURGERY (FESS);  Surgeon: Miguel Angel Marcano MD;  Location: [facility]     OSTEOTOMY ANKLE Right      SEPTOPLASTY, TURBINOPLASTY, COMBINED N/A 10/28/2022    Procedure: SEPTOPLASTY, NOSE, WITH TURBINOPLASTY;  Surgeon: Miguel Angel Marcano MD;  Location: [facility]       Family History   Problem Relation Age of Onset     Hyperlipidemia Mother      Thyroid Disease Mother      Seasonal/Environmental Allergies Mother      Diabetes Father         typeII     Hypertension Father      Hyperlipidemia Father      Depression Father      Seasonal/Environmental Allergies Father      Asthma Sister      Seasonal/Environmental Allergies Sister      Other - See Comments Sister         May-Thurner syndrome     Thyroid Disease Sister      Hyperlipidemia Maternal Grandmother      Colon Cancer Maternal Grandfather      Chronic Obstructive Pulmonary Disease Paternal Grandmother      Esophageal Cancer Paternal Grandfather        Reviewed past medical, surgical, and family history with patient found on new patient intake packet located in EMR Media tab.     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 Otherwise 13 systems reviewed are negative. Please see the patient's intake questionnaire from today for details.    OBJECTIVE:  /70 (BP Location: Left  "arm, Patient Position: Sitting, Cuff Size: Adult Regular)   Pulse 76   Ht 5' 5\" (1.651 m)   Wt 190 lb (86.2 kg)   BMI 31.62 kg/m      PHYSICAL EXAMINATION: Reviewed and updated as needed  --CONSTITUTIONAL: Vital signs as above. No acute distress. The patient is well nourished and well groomed.  --PSYCHIATRIC: The patient is awake, alert, oriented to person, place, time and answering questions appropriately with clear speech. Appropriate mood and affect   --RESPIRATORY: Normal rhythm and effort. No abnormal accessory muscle breathing patterns noted.   --GROSS MOTOR: Easily arises from a seated position.  --LUMBAR SPINE: Inspection reveals no evidence of deformity. Range of motion is not limited in flexion, extension, lateral rotation, but patient reports increased pain in right lower back with flexion and left lateral bending. Mild tenderness to palpation of right lateral QL and lumbar paraspinals, no tenderness in spinous processes.  Facet loading (Cao test) negative  --HIPS: Full range of motion bilaterally.  --LOWER EXTREMITY MOTOR TESTING:  Hip flexion left 5/5, right 5/5  Knee extension left 5/5, right 5/5  Ankle dorsiflexors left 5/5, right 5/5  Ankle plantarflexors left 5/5, right 5/5   Great toe extension left 5/5, right 5/5   Knee flexion left 5/5, right 5/5  --VASCULAR: Warm upper limbs bilaterally. Capillary refill in the upper extremities is less than 1 second.    RESULTS: Available medical records from [facility] and any other outside records were reviewed today.     Imaging:  Available relevant imaging was personally reviewed and interpreted today. The images were shown to the patient and the findings were explained using a spine model.    7/19/2024 XR lumbar spine:    IMPRESSION: 5 lumbar type vertebrae. Normal alignment in the neutral,  flexed and extended positions. Vertebral body heights normal. No  fractures. No significant degenerative change.       Again, thank you for allowing me to " participate in the care of your patient.        Sincerely,        Valentin Mistry MD

## 2024-09-04 NOTE — PROGRESS NOTES
DISCHARGE  Reason for Discharge: Patient has failed to schedule further appointments.    Equipment Issued:     Discharge Plan: Patient to continue home program.    Referring Provider:  Valentin Mistry     05/23/24 0500   Appointment Info   Signing clinician's name / credentials Kris Hoenk, PT   Visits Used 9   Medical Diagnosis Chronic right-sided low back pain without sciatica (M54.50, G89.29)  - Primary   PT Tx Diagnosis low back pain   Progress Note/Certification   Onset of illness/injury or Date of Surgery 09/01/23   Therapy Frequency 1x/week to every 2wks   Predicted Duration 4-6 more weeks   Progress Note Due Date 04/22/24   Progress Note Completed Date 02/12/24   GOALS   PT Goals 2;3   PT Goal 1   Goal Identifier STG   Goal Description Patient to tolerate more than 30 minutes of sitting with <3/10 LBP.   Target Date 03/11/24   Date Met 05/01/24   PT Goal 2   Goal Identifier STG  still valid extend date to 6/1   Goal Description Patient to state she is not limited by pain in regards to sleep quality.   Goal Progress stiff in morning, does not wake   Target Date 03/18/24   PT Goal 3   Goal Identifier LTG still valid extend date to 6/1   Goal Description Patient to report no increase of LBP with sitting on the floor playing with her kids.   Goal Progress better new R thigh tingle   Target Date 04/22/24   Subjective Report   Subjective Report new weird tingling down front of R thigh, LB doing better   Objective Measures   Objective Measures Objective Measure 1   Objective Measure 1   Details LROM did not change ant thigh tingle, femoral nerve stretch neg, SLR neg   Treatment Interventions (PT)   Interventions Therapeutic Procedure/Exercise;Manual Therapy   Manual Therapy   Manual Therapy: Mobilization, MFR, MLD, friction massage minutes ([id number]) 25   Manual Therapy 1 - Details MET pube clearing only, STM R L1-to L5,  QL, LB along IC   Skilled Intervention MET, STM to improve tissue mobility and pain tolerance    Patient Response/Progress no tenderness R low T   Education   Learner/Method Patient;Listening;Reading;Demonstration;Pictures/Video   Plan   Home program ptrx   Plan for next session restart PT 1x/wk x 3-4 more wks, work mm tension, joint mobility   Total Session Time   Timed Code Treatment Minutes 25   Total Treatment Time (sum of timed and untimed services) 25     [facility]  Kris Hoenk  PT  [facility]  [address] [place] [address]  [email or web address]   Office: [phone]  Voicemail: [phone]

## 2024-09-25 ENCOUNTER — OFFICE VISIT (OUTPATIENT)
Dept: URGENT CARE | Facility: URGENT CARE | Age: 39
End: 2024-09-25
Payer: COMMERCIAL

## 2024-09-25 ENCOUNTER — ANCILLARY PROCEDURE (OUTPATIENT)
Dept: GENERAL RADIOLOGY | Facility: CLINIC | Age: 39
End: 2024-09-25
Payer: COMMERCIAL

## 2024-09-25 VITALS
BODY MASS INDEX: 31.35 KG/M2 | HEART RATE: 90 BPM | SYSTOLIC BLOOD PRESSURE: 119 MMHG | DIASTOLIC BLOOD PRESSURE: 85 MMHG | RESPIRATION RATE: 24 BRPM | OXYGEN SATURATION: 99 % | WEIGHT: 188.4 LBS | TEMPERATURE: 97.6 F

## 2024-09-25 DIAGNOSIS — J45.20 MILD INTERMITTENT ASTHMA WITHOUT COMPLICATION: ICD-10-CM

## 2024-09-25 DIAGNOSIS — R05.1 ACUTE COUGH: ICD-10-CM

## 2024-09-25 DIAGNOSIS — J18.9 PNEUMONIA OF RIGHT LOWER LOBE DUE TO INFECTIOUS ORGANISM: Primary | ICD-10-CM

## 2024-09-25 LAB
BASOPHILS # BLD AUTO: 0 10E3/UL (ref 0–0.2)
BASOPHILS NFR BLD AUTO: 0 %
EOSINOPHIL # BLD AUTO: 0.1 10E3/UL (ref 0–0.7)
EOSINOPHIL NFR BLD AUTO: 1 %
ERYTHROCYTE [DISTWIDTH] IN BLOOD BY AUTOMATED COUNT: 15.4 % (ref 10–15)
HCT VFR BLD AUTO: 38.9 % (ref 35–47)
HGB BLD-MCNC: 12.3 G/DL (ref 11.7–15.7)
IMM GRANULOCYTES # BLD: 0 10E3/UL
IMM GRANULOCYTES NFR BLD: 0 %
LYMPHOCYTES # BLD AUTO: 1.4 10E3/UL (ref 0.8–5.3)
LYMPHOCYTES NFR BLD AUTO: 24 %
MCH RBC QN AUTO: 25.1 PG (ref 26.5–33)
MCHC RBC AUTO-ENTMCNC: 31.6 G/DL (ref 31.5–36.5)
MCV RBC AUTO: 79 FL (ref 78–100)
MONOCYTES # BLD AUTO: 0.6 10E3/UL (ref 0–1.3)
MONOCYTES NFR BLD AUTO: 10 %
NEUTROPHILS # BLD AUTO: 3.7 10E3/UL (ref 1.6–8.3)
NEUTROPHILS NFR BLD AUTO: 64 %
PLATELET # BLD AUTO: 222 10E3/UL (ref 150–450)
RBC # BLD AUTO: 4.91 10E6/UL (ref 3.8–5.2)
WBC # BLD AUTO: 5.8 10E3/UL (ref 4–11)

## 2024-09-25 PROCEDURE — 85025 COMPLETE CBC W/AUTO DIFF WBC: CPT

## 2024-09-25 PROCEDURE — 36415 COLL VENOUS BLD VENIPUNCTURE: CPT

## 2024-09-25 PROCEDURE — 71046 X-RAY EXAM CHEST 2 VIEWS: CPT | Mod: TC | Performed by: STUDENT IN AN ORGANIZED HEALTH CARE EDUCATION/TRAINING PROGRAM

## 2024-09-25 PROCEDURE — 99214 OFFICE O/P EST MOD 30 MIN: CPT

## 2024-09-25 RX ORDER — PREDNISONE 20 MG/1
40 TABLET ORAL DAILY
Qty: 10 TABLET | Refills: 0 | Status: SHIPPED | OUTPATIENT
Start: 2024-09-25 | End: 2024-09-30

## 2024-09-25 RX ORDER — ALBUTEROL SULFATE 90 UG/1
2 AEROSOL, METERED RESPIRATORY (INHALATION) EVERY 4 HOURS PRN
Qty: 18 G | Refills: 3 | Status: SHIPPED | OUTPATIENT
Start: 2024-09-25

## 2024-09-25 NOTE — PROGRESS NOTES
URGENT CARE  Assessment & Plan   Assessment:   Teresa Salinas is a 39 year old female who's clinical presentation today is consistent with:   1. Pneumonia of right lower lobe due to infectious organism  - XR Chest 2 Views; Future  - CBC with platelets and differential;   - amoxicillin-clavulanate (AUGMENTIN) 875-125 MG tablet;   - predniSONE (DELTASONE) 20 MG tablet;   2. Mild intermittent asthma without complication  - albuterol (PROAIR HFA/PROVENTIL HFA/VENTOLIN HFA) 108 (90 Base) MCG/ACT inhaler;  Plan:  Will treat patient's pneumonia today w/ antibiotics, however cbc was reassuring (patient is afebrile, vital signs were stable and she was well-appearing) I did discuss with patient the potential that this is a viral pneumonia, additionally will use steroids to help with symptom relief and will refill her albuterol inhaler;  discussed getting plenty of rest and fluids and using ibuprofen or tylenol to help with the fevers, pain and inflammation today   Educated patient they need to follow up with their PCP in the next few days to a week to monitor for improvement; however, if respiratory symptoms worsen in the next 48 hrs they should  present to the ED. Informed patient worsening symptoms would include: dyspnea, lightheadedness, cyanosis, tachypnea, hemoptysis, stiff neck, lethargy, chest pain or new onset of fever/chills.     No alarm signs or symptoms present   Differential Diagnoses for this patient's chief complaint that I considered include:  Bacterial, viral or atypical etiology of lung consolidation / infiltrate, Covid, influenza, bronchitis, Asthma exacerbation, COPD exacerbation, Lung malignancy, bronchiectasis exacerbation, Tuberculosis, empyema     Patient is agreeable to treatment plan and state they will follow-up if symptoms do not improve and/or if symptoms worsen   see patient's AVS 'monitor for' section for specific patient instructions given and discussed regarding what to watch for and when  to follow up    BETTINA Glass CNP  [facility] [place]      ______________________________________________________________________      Subjective     HPI: Teresa Salinas  is a 39 year old  female who presents today for evaluation the following concerns:   Patient endorses a cough today which they state they have had for about a week   Patient reports she has a history of asthma and has been using her inhaler but has not had any relief.  Patient is concerned her cough seems to be worsening and she notices a rattle in her chest.  Patient states over the last 3 days she has also had a fever, chills and bodyaches.  Patient states she has been trying Mucinex and ibuprofen without any relief.  Patient endorses feelings of breathlessness but denies any pleuritic pain     Review of Systems:  Pertinent review of systems as reflected in HPI, otherwise negative.     Objective    Physical Exam:  Vitals:    09/25/24 1007   BP: 119/85   BP Location: Right arm   Patient Position: Sitting   Cuff Size: Adult Regular   Pulse: 90   Resp: 24   Temp: 97.6  F (36.4  C)   TempSrc: Tympanic   SpO2: 99%   Weight: 85.5 kg (188 lb 6.4 oz)      General: Alert and oriented, no acute distress, Vital signs reviewed: afebrile,  normotensive   Psy/mental status: Cooperative, nonanxious  SKIN: Intact, no rashes  EYES: EOMs intact, PERRLA bilaterally   Conjunctiva: Clear bilaterally, no injection or erythema present  EARS: TMs intact, translucent gray in color with normal landmarks present no erythema  or bulging tympanic membrane   Canals are without swelling, however have a mild amount of cerumen, no impaction  NOSE:  mucosa moist               No frontal or maxillary sinus tenderness present bilaterally  MOUTH/THROAT: lips, tongue, & oral mucosa appear normal upon inspection                Posterior oropharynx is erythematous but without exudate, lesions or tonsillar  Edema, no dysphonia, no unilateral tonsillar  edema, no uvular deviation,   no signs of peritonsillar abscess  NECK: supple, has full range of motion with no meningeal signs              No lymphadenopathy present  LUNG: normal work of breathing, good respiratory effort without retractions, good air  movement, non labored, inspection reveals normal chest expansion w/  inspiration            Lung sounds are coarse to auscultation in lower lobes ,            No rales/rhonic/crackles wheezing noted           No cough noted as dry and irritative, frequent     LABS:   Results for orders placed or performed in visit on 09/25/24   XR Chest 2 Views     Status: None    Narrative    CHEST TWO VIEWS 9/25/2024 10:41 AM     HISTORY: rule out pneumonia; Acute cough    COMPARISON: CT chest 9/19/2022. Chest radiograph 9/17/2022.      Impression    IMPRESSION: Patchy right lower lung opacity suspicious for developing  infection. No pleural effusion or pneumothorax. Cardiomediastinal  silhouette is unremarkable.    TREVOR RIVAS MD         SYSTEM ID:  [id number]   Results for orders placed or performed in visit on 09/25/24   CBC with platelets and differential     Status: Abnormal   Result Value Ref Range    WBC Count 5.8 4.0 - 11.0 10e3/uL    RBC Count 4.91 3.80 - 5.20 10e6/uL    Hemoglobin 12.3 11.7 - 15.7 g/dL    Hematocrit 38.9 35.0 - 47.0 %    MCV 79 78 - 100 fL    MCH 25.1 (L) 26.5 - 33.0 pg    MCHC 31.6 31.5 - 36.5 g/dL    RDW 15.4 (H) 10.0 - 15.0 %    Platelet Count 222 150 - 450 10e3/uL    % Neutrophils 64 %    % Lymphocytes 24 %    % Monocytes 10 %    % Eosinophils 1 %    % Basophils 0 %    % Immature Granulocytes 0 %    Absolute Neutrophils 3.7 1.6 - 8.3 10e3/uL    Absolute Lymphocytes 1.4 0.8 - 5.3 10e3/uL    Absolute Monocytes 0.6 0.0 - 1.3 10e3/uL    Absolute Eosinophils 0.1 0.0 - 0.7 10e3/uL    Absolute Basophils 0.0 0.0 - 0.2 10e3/uL    Absolute Immature Granulocytes 0.0 <=0.4 10e3/uL   CBC with platelets and differential     Status: Abnormal    Narrative     The following orders were created for panel order CBC with platelets and differential.  Procedure                               Abnormality         Status                     ---------                               -----------         ------                     CBC with platelets and d...[[id number]]  Abnormal            Final result                 Please view results for these tests on the individual orders.       Imaging:   All images were personally read by this provider (myself).   Per my independent interpretation the xray shows right lower lobe opacity    ______________________________________________________________________    I explained my diagnostic considerations and recommendations to the patient, who voiced understanding and agreement with the treatment plan.   All questions were answered.   We discussed potential side effects, risks and benefits of any prescribed or recommended therapies, as well as expectations for response to treatments.  Please see AVS for any patient instructions & handouts given.   Patient was advised to contact the Nurse Care Line, their Primary Care provider, Urgent Care, or the Emergency Department if there are new or worsening symptoms, or call 911 for emergencies.

## 2024-09-25 NOTE — PATIENT INSTRUCTIONS
Diagnosis: pneumonia     Today we did:  Chest xray right lower lobe opacity   Labs; no elevated wbc count   Possibly a viral pneumonia     Plan:   Antibiotics} today   Ibuprofen and tylenol as needed for pain, fevers, and body aches   Fluids and rest   Follow up with your PCP in the next week     Monitor for:   Lips or skin looks blue, purple, or gray  Feeling dizzy or faint  Trouble breathing or wheezing, Shortness of breath gets worse   Rapid breathing   Coughing up blood  Fever of 101 F or higher,  Shaking chills  Cough with phlegm that doesn't get better, or get worse  Shortness of breath with activities  Weakness, dizziness, or fainting that gets worse  stiff neck, headache and extreme lethargy   Chest pain with breathing or coughing  Symptoms that get worse or not improving       Pneumonia (Adult)  Pneumonia is an infection deep in the lungs.   It is in the small air sacs (alveoli).   It may be caused by a virus, fungus, or bacteria.   Pneumonia caused by bacteria is often treated with an antibiotic.   Severe cases may need to be treated in the hospital.   Milder cases can be treated at home.   Pneumonia symptoms are a lot like flu symptoms  They include fever, cough (dry or with phlegm), headache, muscle weakness, and pain.   These symptoms often get worse in the first 2 days.   But they often start to get better in the first week of treatment

## 2024-11-02 NOTE — PROGRESS NOTES
"[facility]    Return OB Note    CC: Return OB     Subjective:  Teresa is a 36 year old  at 32w0d   Denies vaginal bleeding, loss of fluid, or regular contractions. Good fetal movement.  Complaints today: None    Objective:  BP 99/58 (BP Location: Left arm, Patient Position: Sitting, Cuff Size: Adult Regular)   Pulse 87   Temp 97.3  F (36.3  C) (Tympanic)   Resp 12   Ht 1.575 m (5' 2\")   Wt 83.3 kg (183 lb 11.2 oz)   LMP 2021 (Approximate)   BMI 33.60 kg/m      Fundal height: 32cm  FHT: 135bpm    Assessment/Plan:   Encounter Diagnoses   Name Primary?     Prenatal care, subsequent pregnancy in third trimester Yes     Circumvallate placenta in second trimester      Rh negative state in antepartum period        IUP at 32w0d  -Rh negative: s/p Rhogam  -Circumvallate placenta: following growth which has been normal, next is scheduled for 34 weeks  -AMA: L2 normal  -Failed 1 hr GCT, passed 3 hr GTT  -S/p COVID vaccine and booster  -Strict return precautions given    RTC 2 weeks    Ester Watkins DO    " present

## 2025-02-21 NOTE — PATIENT INSTRUCTIONS
DISCHARGE INSTRUCTIONS    During office hours (8:00 a.m.- 4:00 p.m.) questions or concerns may be answered  by calling [facility] at  [phone].  Messages received after hours will be returned the following business day.      In the case of an emergency, please dial 911 or seek assistance at the nearest Emergency Room/Urgent Care facility.     All Patients:    You may experience an increase in your symptoms for the first 2 days, once the numbing medication wears off.    You may resume your regular medication    You may shower. No swimming, tub bath or hot tub for 24 hours               POSSIBLE PROCEDURE SIDE EFFECTS  -Call [facility] if concerned-    Increased Pain  Increased numbness/tingling     Nausea/Vomiting  Bruising/bleeding at site (hematoma)             Swelling at site (edema) Headache  Difficulty walking  Infection        Fever greater than 100.5        "You must understand that you have received treatment at an Urgent Care facility only, and that you may be  released before all of your medical problems are known or treated. Urgent Care facilities are not equipped to  handle life threatening emergencies. It is recommended that you seek care at an Emergency Department for  further evaluation of worsening or concerning symptoms, or possibly life threatening conditions as  discussed.    COVID-19    If you have COVID-19, stay home while you are sick, rest, and drink plenty of fluids. Treat any bothersome symptoms with over-the-counter medication.    Treatment depends on your age, health, and symptoms. Most people with mild symptoms can rest at home until they get better. "Mild" means that you might have symptoms like fever, cough, or other cold symptoms, but you do not have trouble breathing. Sometimes, it can take about 2 weeks for symptoms to improve, but it's not the same for everyone.    Doctors do recommend treatment to lower the risk of getting sicker for people who are at risk for getting seriously ill. This includes:  · Adults 65 years or older  · Adults who have certain health conditions such as diabetes, heart or lung disease, chronic kidney disease, and obesity.  · Adults 50 years or older who have not been vaccinated.    The medication most often used is nirmatrelvir-ritonavir (Paxlovid)  Molnupiravir may be prescribed if you cannot take Paxlovid.  If your doctor suggests treatment with Paxlovid, it's important to know:  ?Paxlovid comes as several pills that you take for 5 days.  ?Treatment should be started within 5 days after symptoms begin. This is why it's important to test early so you know if you have COVID-19 as soon as possible.  ?Before prescribing Paxlovid, your doctor should review any other medicines and supplements that you take. In some cases, they might want to change or stop your other medicines while you take Paxlovid.    Some people who are " "treated with Paxlovid get something called "viral rebound." This means that they start testing negative after having COVID-19, but then test positive again. Symptoms might also come back, though they are almost always mild. If you are at risk for serious illness, the benefits of treatment are still greater than the risk of viral rebound.    If your condition fails to improve in a timely manner, you should receive another evaluation by your Primary Care Provider to discuss your concerns or return to urgent care for a recheck.    Go to the ED if you develop new or worsening symptoms including but not limited to:  You have trouble breathing.  You have severe chest discomfort.  You feel confused or disoriented.  You are throwing up and cant keep liquids down.  You develop signs of fluid loss, such as dark-colored urine and muscle cramps.  Blue lips or face  Symptoms last over 10 days and/or get worse instead of better    The following are guidelines for returning to work/school:  1. No fever for 24 hours without use of fever-reducing medication.  2. Overall symptoms have improved for 24 hours (No new or worsening symptoms).  Additional prevention strategies are encouraged for the next 5 days to curb disease spread by enhancing hygiene practices such as good handwashing, use of hand  and wearing a well-fitting mask    **You must understand that you have received Urgent Care treatment only and that you may be released before all your medical problems are known or treated. You, the patient, are responsible to arrange for follow-up care as instructed.    "

## 2025-03-22 ENCOUNTER — HEALTH MAINTENANCE LETTER (OUTPATIENT)
Age: 40
End: 2025-03-22

## 2025-06-26 ENCOUNTER — TRANSCRIBE ORDERS (OUTPATIENT)
Dept: OTHER | Age: 40
End: 2025-06-26

## 2025-06-26 ENCOUNTER — THERAPY VISIT (OUTPATIENT)
Dept: PHYSICAL THERAPY | Facility: CLINIC | Age: 40
End: 2025-06-26
Attending: ORTHOPAEDIC SURGERY
Payer: COMMERCIAL

## 2025-06-26 DIAGNOSIS — M25.579 PAIN IN JOINT, ANKLE AND FOOT, UNSPECIFIED LATERALITY: Primary | ICD-10-CM

## 2025-06-26 DIAGNOSIS — S93.492A HIGH ANKLE SPRAIN OF LEFT LOWER EXTREMITY: Primary | ICD-10-CM

## 2025-06-26 PROCEDURE — 97161 PT EVAL LOW COMPLEX 20 MIN: CPT | Mod: GP | Performed by: PHYSICAL THERAPIST

## 2025-06-26 PROCEDURE — 97110 THERAPEUTIC EXERCISES: CPT | Mod: GP | Performed by: PHYSICAL THERAPIST

## 2025-06-26 ASSESSMENT — ACTIVITIES OF DAILY LIVING (ADL)
MAKING_SHARP_TURNS_WHILE_RUNNING_FAST: QUITE A BIT OF DIFFICULTY
WALKING_2_BLOCKS: NO DIFFICULTY
YOUR_USUAL_HOBBIES,_RECREATIONAL_OR_SPORTING_ACTIVITIES: QUITE A BIT OF DIFFICULTY
WALKING_A_MILE: A LITTLE BIT OF DIFFICULTY
PERFORMING_HEAVY_ACTIVITIES_AROUND_YOUR_HOME: A LITTLE BIT OF DIFFICULTY
PUTTING_ON_YOUR_SHOES_OR_SOCKS: A LITTLE BIT OF DIFFICULTY
LEFS_RAW_SCORE: 0
ROLLING_OVER_IN_BED: NO DIFFICULTY
SQUATTING: A LITTLE BIT OF DIFFICULTY
GETTING_INTO_OR_OUT_OF_A_CAR: NO DIFFICULTY
RUNNING_ON_EVEN_GROUND: QUITE A BIT OF DIFFICULTY
GETTING_INTO_AND_OUT_OF_A_BATH: NO DIFFICULTY
SITTING_FOR_1_HOUR: NO DIFFICULTY
GOING_UP_OR_DOWN_10_STAIRS: A LITTLE BIT OF DIFFICULTY
ANY_OF_YOUR_USUAL_WORK,_HOUSEWORK_OR_SCHOOL_ACTIVITIES: A LITTLE BIT OF DIFFICULTY
PERFORMING_LIGHT_ACTIVITIES_AROUND_YOUR_HOME: NO DIFFICULTY
RUNNING_ON_UNEVEN_GROUND: EXTREME DIFFICULTY OR UNABLE TO PERFORM ACTIVITY
SHOPPING: QUITE A BIT OF DIFFICULTY
WALKING_BETWEEN_ROOMS: NO DIFFICULTY
LEFS_SCORE(%): 0
STANDING_FOR_1_HOUR: NO DIFFICULTY
LIFTING_AN_OBJECT,_LIKE_A_BAG_OF_GROCERIES_FROM_THE_FLOOR: NO DIFFICULTY
PLEASE_INDICATE_YOR_PRIMARY_REASON_FOR_REFERRAL_TO_THERAPY:: FOOT AND/OR ANKLE

## 2025-06-26 NOTE — PROGRESS NOTES
PHYSICAL THERAPY EVALUATION  Type of Visit: Evaluation        Fall Risk Screen:  Have you fallen 2 or more times in the past year?: No  Have you fallen and had an injury in the past year?: No  Is patient receiving Physical Therapy Services?: Yes  Fall screen comments: no concerns    Subjective   L high ankle sprain, L foot got stuck on very  high fast curvy slide and twisted outward. May 6th, NWB a week, then wore a boot. Out of boot couple weeks. Lost some motion, still hurts.       Presenting condition or subjective complaint: high ankle sprain  Date of onset: 05/06/25    Relevant medical history: Asthma   Dates & types of surgery: right ankle surgery 1999 and 2002    Prior diagnostic imaging/testing results: X-ray     Prior therapy history for the same diagnosis, illness or injury: No      Prior Level of Function  Transfers: Independent  Ambulation: Independent  ADL: Independent  IADL:     Living Environment  Social support: With a significant other or spouse   Type of home: House   Stairs to enter the home: Yes 2 Is there a railing: No     Ramp: No   Stairs inside the home: Yes 6 Is there a railing: Yes     Help at home: Self Cares (home health aide/personal care attendant, family, etc)  Equipment owned:       Employment: Yes registered nurse  Hobbies/Interests: hiking,camping,skiing    Patient goals for therapy: walk faster or run briefly    Pain assessment:      Objective   FOOT/ANKLE EVALUATION  PAIN: 3  INTEGUMENTARY (edema, incisions): mild swelling dorsum forefoot  POSTURE:   GAIT: antalgic  Weightbearing Status:   Assistive Device(s):   Gait Deviations:   BALANCE/PROPRIOCEPTION:   WEIGHT BEARING ALIGNMENT:   NON-WEIGHTBEARING ALIGNMENT:    ROM: DF L 0* long sitting, R 5* (had prior surgery), PF L 35*, R excessive, WB DF L 18*, R 33    STRENGTH: L ankle IV 5, EV 4+ min pain outer ankle, PF <3/5 unable to do SL heel raise  FLEXIBILITY:   SPECIAL TESTS: SL balance 30 sec  FUNCTIONAL TESTS:   PALPATION:  tender lateral malleolus area  JOINT MOBILITY:     Assessment & Plan   CLINICAL IMPRESSIONS  Medical Diagnosis: high ankle sprain L    Treatment Diagnosis: L ankle decreased ROM, strength   Impression/Assessment: Patient is a 39 year old female with L ankle complaints.  The following significant findings have been identified: Pain, Decreased ROM/flexibility, Decreased strength, Edema, and Impaired gait. These impairments interfere with their ability to perform work tasks, recreational activities, household chores, and community mobility as compared to previous level of function.     Clinical Decision Making (Complexity):  Clinical Presentation: Stable/Uncomplicated  Clinical Presentation Rationale: based on medical and personal factors listed in PT evaluation  Clinical Decision Making (Complexity): Low complexity    PLAN OF CARE  Treatment Interventions:  Interventions: Gait Training, Manual Therapy, Neuromuscular Re-education, Therapeutic Activity, Therapeutic Exercise, Self-Care/Home Management    Long Term Goals     PT Goal 1  Goal Identifier: 1  Goal Description: will not limp with gait in 4wk  Target Date: 07/24/25  PT Goal 2  Goal Identifier: 2  Goal Description: will be able to maritza after 5 yo child for safety in 6wk  Target Date: 08/07/25      Frequency of Treatment: 1x/wk  Duration of Treatment: 2-3 visits over 6wk    Recommended Referrals to Other Professionals:   Education Assessment:   Learner/Method: Patient;Pictures/Video;No Barriers to Learning    Risks and benefits of evaluation/treatment have been explained.   Patient/Family/caregiver agrees with Plan of Care.     Evaluation Time:     PT Eval, Low Complexity Minutes (99925): 15       Signing Clinician: Kris Hoenk, PT

## 2025-07-02 ENCOUNTER — THERAPY VISIT (OUTPATIENT)
Dept: PHYSICAL THERAPY | Facility: CLINIC | Age: 40
End: 2025-07-02
Attending: ORTHOPAEDIC SURGERY
Payer: COMMERCIAL

## 2025-07-02 DIAGNOSIS — M25.579 PAIN IN JOINT, ANKLE AND FOOT, UNSPECIFIED LATERALITY: Primary | ICD-10-CM

## 2025-07-02 PROCEDURE — 97110 THERAPEUTIC EXERCISES: CPT | Mod: GP

## (undated) DEVICE — SU ETHILON 3-0 FS-1 18" 669H

## (undated) DEVICE — SYR BULB IRRIG 50ML LATEX FREE 0035280

## (undated) DEVICE — ENDO SHEATH S&N ENDOSCRUB 30DEG 4MM 1912014

## (undated) DEVICE — BLADE KNIFE SURG 15 371115

## (undated) DEVICE — SOL NACL 0.9% 100ML BAG 2B1302

## (undated) DEVICE — SOL NACL 0.9% IRRIG 1000ML BOTTLE 07138-09

## (undated) DEVICE — BLADE INFERIOR TURBINATE 2.9MMX11CM 1882940HR

## (undated) DEVICE — DECANTER VIAL 2006S

## (undated) DEVICE — BLADE SHAVER SINUS 4MM RAD 12DEG CVD 1884012HR

## (undated) DEVICE — TUBING SUCTION 12"X1/4" N612

## (undated) DEVICE — PACK BASIC 9103

## (undated) DEVICE — LIGHT HANDLE X2

## (undated) DEVICE — BASIN SET MINOR DISP

## (undated) DEVICE — ADAPTER CATHETER ADDTO 2219

## (undated) DEVICE — SPONGE COTTONOID 1/2X3" 80-1407

## (undated) DEVICE — SPLINT INTRANASAL POSISEPX GEL SPONGE 9210584

## (undated) DEVICE — ANTIFOG SOLUTION W/FOAM PAD 31142527

## (undated) DEVICE — TUBING IRRIGATOR STRAIGHTSHOT XPS 1895522

## (undated) DEVICE — SU CHROMIC 4-0 RB-1 27" U203H

## (undated) DEVICE — ENDO SHEATH AND TUBING S&N ENDOSCRUB 0DEG 4MM 1912004

## (undated) DEVICE — DRAPE U SPLIT 74X120" 29440

## (undated) DEVICE — DRSG TELFA 2X3"

## (undated) DEVICE — GLOVE PROTEXIS W/NEU-THERA 7.5  2D73TE75

## (undated) DEVICE — PREP POVIDONE IODINE SWABS X3

## (undated) DEVICE — SYR 50ML LL W/O NDL 309653

## (undated) DEVICE — SPONGE RAY-TEC 4X8" 7318

## (undated) DEVICE — SYR 10ML FINGER CONTROL W/O NDL 309695

## (undated) DEVICE — SOL NACL 0.9% INJ 1000ML BAG 07983-09

## (undated) DEVICE — NDL 27GA 1.25" 305136

## (undated) DEVICE — Device

## (undated) DEVICE — TUBING CYSTO/BLADDER IRRIG SET 80" 06544-01

## (undated) DEVICE — GOWN XLG DISP 9545

## (undated) DEVICE — LABEL MEDICATION SYSTEM  3304

## (undated) DEVICE — SOL WATER IRRIG 1000ML BOTTLE 07139-09

## (undated) RX ORDER — LIDOCAINE HYDROCHLORIDE AND EPINEPHRINE 10; 10 MG/ML; UG/ML
INJECTION, SOLUTION INFILTRATION; PERINEURAL
Status: DISPENSED
Start: 2022-10-28

## (undated) RX ORDER — FENTANYL CITRATE 50 UG/ML
INJECTION, SOLUTION INTRAMUSCULAR; INTRAVENOUS
Status: DISPENSED
Start: 2022-09-05

## (undated) RX ORDER — DEXAMETHASONE SODIUM PHOSPHATE 10 MG/ML
INJECTION, SOLUTION INTRAMUSCULAR; INTRAVENOUS
Status: DISPENSED
Start: 2022-10-28

## (undated) RX ORDER — FENTANYL CITRATE 50 UG/ML
INJECTION, SOLUTION INTRAMUSCULAR; INTRAVENOUS
Status: DISPENSED
Start: 2019-09-15

## (undated) RX ORDER — BUPIVACAINE HYDROCHLORIDE 2.5 MG/ML
INJECTION, SOLUTION EPIDURAL; INFILTRATION; INTRACAUDAL
Status: DISPENSED
Start: 2019-09-15

## (undated) RX ORDER — LIDOCAINE HYDROCHLORIDE 20 MG/ML
JELLY TOPICAL
Status: DISPENSED
Start: 2022-10-28

## (undated) RX ORDER — PROPOFOL 10 MG/ML
INJECTION, EMULSION INTRAVENOUS
Status: DISPENSED
Start: 2022-10-28

## (undated) RX ORDER — BUPIVACAINE HYDROCHLORIDE 2.5 MG/ML
INJECTION, SOLUTION EPIDURAL; INFILTRATION; INTRACAUDAL
Status: DISPENSED
Start: 2022-09-05

## (undated) RX ORDER — DEXMEDETOMIDINE HYDROCHLORIDE 100 UG/ML
INJECTION, SOLUTION INTRAVENOUS
Status: DISPENSED
Start: 2022-10-28

## (undated) RX ORDER — ONDANSETRON 2 MG/ML
INJECTION INTRAMUSCULAR; INTRAVENOUS
Status: DISPENSED
Start: 2022-10-28

## (undated) RX ORDER — SCOLOPAMINE TRANSDERMAL SYSTEM 1 MG/1
PATCH, EXTENDED RELEASE TRANSDERMAL
Status: DISPENSED
Start: 2022-10-28

## (undated) RX ORDER — GLYCOPYRROLATE 0.2 MG/ML
INJECTION, SOLUTION INTRAMUSCULAR; INTRAVENOUS
Status: DISPENSED
Start: 2022-10-28

## (undated) RX ORDER — OXYMETAZOLINE HYDROCHLORIDE 0.05 G/100ML
SPRAY NASAL
Status: DISPENSED
Start: 2022-10-28

## (undated) RX ORDER — CEFAZOLIN SODIUM 1 G/3ML
INJECTION, POWDER, FOR SOLUTION INTRAMUSCULAR; INTRAVENOUS
Status: DISPENSED
Start: 2022-10-28

## (undated) RX ORDER — IBUPROFEN 600 MG/1
TABLET, FILM COATED ORAL
Status: DISPENSED
Start: 2022-10-28

## (undated) RX ORDER — FENTANYL CITRATE 50 UG/ML
INJECTION, SOLUTION INTRAMUSCULAR; INTRAVENOUS
Status: DISPENSED
Start: 2022-10-28

## (undated) RX ORDER — ACETAMINOPHEN 325 MG/1
TABLET ORAL
Status: DISPENSED
Start: 2022-10-28